# Patient Record
Sex: MALE | Race: WHITE | Employment: OTHER | URBAN - METROPOLITAN AREA
[De-identification: names, ages, dates, MRNs, and addresses within clinical notes are randomized per-mention and may not be internally consistent; named-entity substitution may affect disease eponyms.]

---

## 2018-08-31 RX ORDER — ASPIRIN 81 MG/1
1 TABLET ORAL DAILY
COMMUNITY

## 2018-08-31 RX ORDER — METOPROLOL TARTRATE 100 MG/1
100 TABLET ORAL 2 TIMES DAILY
COMMUNITY
Start: 2014-01-15

## 2018-08-31 RX ORDER — RANOLAZINE 500 MG/1
500 TABLET, EXTENDED RELEASE ORAL 2 TIMES DAILY
COMMUNITY
Start: 2014-01-15

## 2018-08-31 RX ORDER — NITROGLYCERIN 80 MG/1
1 PATCH TRANSDERMAL DAILY
COMMUNITY
Start: 2018-02-13

## 2018-08-31 RX ORDER — ATORVASTATIN CALCIUM 40 MG/1
40 TABLET, FILM COATED ORAL
COMMUNITY
Start: 2014-01-15

## 2018-08-31 RX ORDER — METFORMIN HYDROCHLORIDE 1000 MG/1
500 TABLET ORAL 2 TIMES DAILY
COMMUNITY
End: 2018-09-14

## 2018-08-31 NOTE — PERIOP NOTES
PAT - SURGICAL PRE-ADMISSION INSTRUCTIONS 
 
NAME:  Beverly Hoyos                                                          TODAY'S DATE:  8/31/2018 SURGERY DATE:  9/5/2018                                  SURGERY ARRIVAL TIME:   4722 1. Do NOT eat or drink anything, including candy or gum, after MIDNIGHT on 09/04/18 , unless you have specific instructions from your Surgeon or Anesthesia Provider to do so. 2. No smoking on the day of surgery. 3. No alcohol 24 hours prior to the day of surgery. 4. No recreational drugs for one week prior to the day of surgery. 5. Leave all valuables, including money/purse, at home. 6. Remove all jewelry, nail polish, makeup (including mascara); no lotions, powders, deodorant, or perfume/cologne/after shave. 7. Glasses/Contact lenses and Dentures may be worn to the hospital.  They will be removed prior to surgery. 8. Call your doctor if symptoms of a cold or illness develop within 24 ours prior to surgery. 9. AN ADULT MUST DRIVE YOU HOME AFTER OUTPATIENT SURGERY. 10. If you are having an OUTPATIENT procedure, please make arrangements for a responsible adult to be with you for 24 hours after your surgery. 11. If you are admitted to the hospital, you will be assigned to a bed after surgery is complete. Normally a family member will not be able to see you until you are in your assigned bed. 15. Family is encouraged to accompany you to the hospital.  We ask visitors in the treatment area to be limited to ONE person at a time to ensure patient privacy. EXCEPTIONS WILL BE MADE AS NEEDED. 15. Children under 12 are discouraged from entering the treatment area and need to be supervised by an adult when in the waiting room. Special Instructions: Take these medications the morning of surgery with a sip of water:  Ranexa , metoprolol, HOLD oral diabetic medication on the MORNING OF surgery. , HOLD metformin/glucophage dose starting the EVENING BEFORE the day of surgery., STOP anticoagulants AT LEAST 1 WEEK PRIOR to your surgery or, follow other MD instructions:  Aspirin and Xarelto last dose 8/31 Patient Prep: 
 
use CHG solution These surgical instructions were reviewed with Valery Ausitn during the PAT phone call. Directions: On the morning of surgery, please go to the [address] [place] [address]. Enter the building from the [facility] entrance, 1st floor (next to the Emergency Room entrance). Take the elevator to the 2nd floor. Sign in at the Registration Desk. If you have any questions and/or concerns, please do not hesitate to call: 
(Prior to the day of surgery)  [facility] unit:  [phone] (Day of surgery)  [facility] unit:  [phone]

## 2018-09-04 ENCOUNTER — ANESTHESIA EVENT (OUTPATIENT)
Dept: SURGERY | Age: 68
DRG: 460 | End: 2018-09-04
Payer: MEDICARE

## 2018-09-05 ENCOUNTER — HOSPITAL ENCOUNTER (INPATIENT)
Age: 68
LOS: 9 days | Discharge: NURSING, REHAB OR LONG-TERM CARE | DRG: 460 | End: 2018-09-14
Attending: ORTHOPAEDIC SURGERY | Admitting: ORTHOPAEDIC SURGERY
Payer: MEDICARE

## 2018-09-05 ENCOUNTER — APPOINTMENT (OUTPATIENT)
Dept: GENERAL RADIOLOGY | Age: 68
DRG: 460 | End: 2018-09-05
Attending: ORTHOPAEDIC SURGERY
Payer: MEDICARE

## 2018-09-05 ENCOUNTER — ANESTHESIA (OUTPATIENT)
Dept: SURGERY | Age: 68
DRG: 460 | End: 2018-09-05
Payer: MEDICARE

## 2018-09-05 DIAGNOSIS — M48.061 SPINAL STENOSIS OF LUMBAR REGION, UNSPECIFIED WHETHER NEUROGENIC CLAUDICATION PRESENT: Primary | ICD-10-CM

## 2018-09-05 LAB
ABO + RH BLD: NORMAL
BLOOD GROUP ANTIBODIES SERPL: NORMAL
GLUCOSE BLD STRIP.AUTO-MCNC: 167 MG/DL (ref 70–110)
GLUCOSE BLD STRIP.AUTO-MCNC: 180 MG/DL (ref 70–110)
GLUCOSE BLD STRIP.AUTO-MCNC: 180 MG/DL (ref 70–110)
SPECIMEN EXP DATE BLD: NORMAL

## 2018-09-05 PROCEDURE — 74011000250 HC RX REV CODE- 250: Performed by: ORTHOPAEDIC SURGERY

## 2018-09-05 PROCEDURE — 74011250636 HC RX REV CODE- 250/636

## 2018-09-05 PROCEDURE — 76010000134 HC OR TIME 3.5 TO 4 HR: Performed by: ORTHOPAEDIC SURGERY

## 2018-09-05 PROCEDURE — 77030031139 HC SUT VCRL2 J&J -A: Performed by: ORTHOPAEDIC SURGERY

## 2018-09-05 PROCEDURE — 65270000029 HC RM PRIVATE

## 2018-09-05 PROCEDURE — 97530 THERAPEUTIC ACTIVITIES: CPT

## 2018-09-05 PROCEDURE — 74011000272 HC RX REV CODE- 272: Performed by: ORTHOPAEDIC SURGERY

## 2018-09-05 PROCEDURE — 74011636637 HC RX REV CODE- 636/637: Performed by: NURSE ANESTHETIST, CERTIFIED REGISTERED

## 2018-09-05 PROCEDURE — 86900 BLOOD TYPING SEROLOGIC ABO: CPT | Performed by: NURSE ANESTHETIST, CERTIFIED REGISTERED

## 2018-09-05 PROCEDURE — 77030018836 HC SOL IRR NACL ICUM -A: Performed by: ORTHOPAEDIC SURGERY

## 2018-09-05 PROCEDURE — 0SG0071 FUSION OF LUMBAR VERTEBRAL JOINT WITH AUTOLOGOUS TISSUE SUBSTITUTE, POSTERIOR APPROACH, POSTERIOR COLUMN, OPEN APPROACH: ICD-10-PCS | Performed by: ORTHOPAEDIC SURGERY

## 2018-09-05 PROCEDURE — C1713 ANCHOR/SCREW BN/BN,TIS/BN: HCPCS | Performed by: ORTHOPAEDIC SURGERY

## 2018-09-05 PROCEDURE — 77030004391 HC BUR FLUT MEDT -C: Performed by: ORTHOPAEDIC SURGERY

## 2018-09-05 PROCEDURE — 77030020269 HC MISC IMPL: Performed by: ORTHOPAEDIC SURGERY

## 2018-09-05 PROCEDURE — 77030030107 HC BLD BN MILL DISP STRY -C: Performed by: ORTHOPAEDIC SURGERY

## 2018-09-05 PROCEDURE — 74011250637 HC RX REV CODE- 250/637: Performed by: INTERNAL MEDICINE

## 2018-09-05 PROCEDURE — 74011250636 HC RX REV CODE- 250/636: Performed by: NURSE ANESTHETIST, CERTIFIED REGISTERED

## 2018-09-05 PROCEDURE — C1762 CONN TISS, HUMAN(INC FASCIA): HCPCS | Performed by: ORTHOPAEDIC SURGERY

## 2018-09-05 PROCEDURE — 0ST20ZZ RESECTION OF LUMBAR VERTEBRAL DISC, OPEN APPROACH: ICD-10-PCS | Performed by: ORTHOPAEDIC SURGERY

## 2018-09-05 PROCEDURE — 74011250636 HC RX REV CODE- 250/636: Performed by: INTERNAL MEDICINE

## 2018-09-05 PROCEDURE — 36415 COLL VENOUS BLD VENIPUNCTURE: CPT | Performed by: NURSE ANESTHETIST, CERTIFIED REGISTERED

## 2018-09-05 PROCEDURE — 77030018723 HC ELCTRD BLD COVD -A: Performed by: ORTHOPAEDIC SURGERY

## 2018-09-05 PROCEDURE — 77030034850: Performed by: ORTHOPAEDIC SURGERY

## 2018-09-05 PROCEDURE — 77030011265 HC ELECTRD BLD HEX COVD -A: Performed by: ORTHOPAEDIC SURGERY

## 2018-09-05 PROCEDURE — 77030031359 HC BLD BN MILL DISP STRY -E: Performed by: ORTHOPAEDIC SURGERY

## 2018-09-05 PROCEDURE — 77010033678 HC OXYGEN DAILY

## 2018-09-05 PROCEDURE — 74011000250 HC RX REV CODE- 250

## 2018-09-05 PROCEDURE — 01NB0ZZ RELEASE LUMBAR NERVE, OPEN APPROACH: ICD-10-PCS | Performed by: ORTHOPAEDIC SURGERY

## 2018-09-05 PROCEDURE — 74011250637 HC RX REV CODE- 250/637: Performed by: NURSE ANESTHETIST, CERTIFIED REGISTERED

## 2018-09-05 PROCEDURE — 76210000016 HC OR PH I REC 1 TO 1.5 HR: Performed by: ORTHOPAEDIC SURGERY

## 2018-09-05 PROCEDURE — 77030019908 HC STETH ESOPH SIMS -A: Performed by: ANESTHESIOLOGY

## 2018-09-05 PROCEDURE — 82962 GLUCOSE BLOOD TEST: CPT

## 2018-09-05 PROCEDURE — 77030018673: Performed by: ORTHOPAEDIC SURGERY

## 2018-09-05 PROCEDURE — 77030013079 HC BLNKT BAIR HGGR 3M -A: Performed by: ANESTHESIOLOGY

## 2018-09-05 PROCEDURE — 97116 GAIT TRAINING THERAPY: CPT

## 2018-09-05 PROCEDURE — 74011250637 HC RX REV CODE- 250/637: Performed by: ORTHOPAEDIC SURGERY

## 2018-09-05 PROCEDURE — 97162 PT EVAL MOD COMPLEX 30 MIN: CPT

## 2018-09-05 PROCEDURE — 77030003028 HC SUT VCRL J&J -A: Performed by: ORTHOPAEDIC SURGERY

## 2018-09-05 PROCEDURE — 77030026918 HC ADMN ST IV BLD BD -A: Performed by: ANESTHESIOLOGY

## 2018-09-05 PROCEDURE — 77030008477 HC STYL SATN SLP COVD -A: Performed by: ANESTHESIOLOGY

## 2018-09-05 PROCEDURE — 74011636637 HC RX REV CODE- 636/637: Performed by: ORTHOPAEDIC SURGERY

## 2018-09-05 PROCEDURE — 74011250636 HC RX REV CODE- 250/636: Performed by: ORTHOPAEDIC SURGERY

## 2018-09-05 PROCEDURE — 77030021352 HC CBL LD SYS DISP COVD -B

## 2018-09-05 PROCEDURE — 77030032490 HC SLV COMPR SCD KNE COVD -B: Performed by: ORTHOPAEDIC SURGERY

## 2018-09-05 PROCEDURE — 76060000038 HC ANESTHESIA 3.5 TO 4 HR: Performed by: ORTHOPAEDIC SURGERY

## 2018-09-05 PROCEDURE — 72100 X-RAY EXAM L-S SPINE 2/3 VWS: CPT

## 2018-09-05 PROCEDURE — 77030008683 HC TU ET CUF COVD -A: Performed by: ANESTHESIOLOGY

## 2018-09-05 DEVICE — SCREW POLYAX ASMBLY 6.5MMX45MM: Type: IMPLANTABLE DEVICE | Site: SPINE LUMBAR | Status: FUNCTIONAL

## 2018-09-05 DEVICE — SCREW SPNL L50MM OD65MM POLYAX ASSEMB CANAVERAL: Type: IMPLANTABLE DEVICE | Site: SPINE LUMBAR | Status: FUNCTIONAL

## 2018-09-05 RX ORDER — METOPROLOL TARTRATE 50 MG/1
100 TABLET ORAL 2 TIMES DAILY
Status: DISCONTINUED | OUTPATIENT
Start: 2018-09-05 | End: 2018-09-05

## 2018-09-05 RX ORDER — MORPHINE SULFATE 4 MG/ML
INJECTION INTRAVENOUS
Status: DISPENSED
Start: 2018-09-05 | End: 2018-09-06

## 2018-09-05 RX ORDER — ASPIRIN 81 MG/1
81 TABLET ORAL DAILY
Status: DISCONTINUED | OUTPATIENT
Start: 2018-09-06 | End: 2018-09-14 | Stop reason: HOSPADM

## 2018-09-05 RX ORDER — SODIUM CHLORIDE 0.9 % (FLUSH) 0.9 %
5-10 SYRINGE (ML) INJECTION AS NEEDED
Status: DISCONTINUED | OUTPATIENT
Start: 2018-09-05 | End: 2018-09-14 | Stop reason: HOSPADM

## 2018-09-05 RX ORDER — ONDANSETRON 2 MG/ML
4 INJECTION INTRAMUSCULAR; INTRAVENOUS ONCE
Status: DISCONTINUED | OUTPATIENT
Start: 2018-09-05 | End: 2018-09-05 | Stop reason: HOSPADM

## 2018-09-05 RX ORDER — INSULIN LISPRO 100 [IU]/ML
INJECTION, SOLUTION INTRAVENOUS; SUBCUTANEOUS ONCE
Status: COMPLETED | OUTPATIENT
Start: 2018-09-05 | End: 2018-09-05

## 2018-09-05 RX ORDER — HYDROMORPHONE HYDROCHLORIDE 1 MG/ML
INJECTION, SOLUTION INTRAMUSCULAR; INTRAVENOUS; SUBCUTANEOUS AS NEEDED
Status: DISCONTINUED | OUTPATIENT
Start: 2018-09-05 | End: 2018-09-05 | Stop reason: HOSPADM

## 2018-09-05 RX ORDER — RANOLAZINE 500 MG/1
500 TABLET, EXTENDED RELEASE ORAL 2 TIMES DAILY
Status: DISCONTINUED | OUTPATIENT
Start: 2018-09-05 | End: 2018-09-14 | Stop reason: HOSPADM

## 2018-09-05 RX ORDER — LABETALOL HCL 20 MG/4 ML
SYRINGE (ML) INTRAVENOUS AS NEEDED
Status: DISCONTINUED | OUTPATIENT
Start: 2018-09-05 | End: 2018-09-05 | Stop reason: HOSPADM

## 2018-09-05 RX ORDER — INSULIN LISPRO 100 [IU]/ML
INJECTION, SOLUTION INTRAVENOUS; SUBCUTANEOUS
Status: DISCONTINUED | OUTPATIENT
Start: 2018-09-05 | End: 2018-09-07

## 2018-09-05 RX ORDER — SODIUM CHLORIDE, SODIUM LACTATE, POTASSIUM CHLORIDE, CALCIUM CHLORIDE 600; 310; 30; 20 MG/100ML; MG/100ML; MG/100ML; MG/100ML
25 INJECTION, SOLUTION INTRAVENOUS CONTINUOUS
Status: DISPENSED | OUTPATIENT
Start: 2018-09-05 | End: 2018-09-06

## 2018-09-05 RX ORDER — FENTANYL CITRATE 50 UG/ML
INJECTION, SOLUTION INTRAMUSCULAR; INTRAVENOUS AS NEEDED
Status: DISCONTINUED | OUTPATIENT
Start: 2018-09-05 | End: 2018-09-05 | Stop reason: HOSPADM

## 2018-09-05 RX ORDER — GLYCOPYRROLATE 0.2 MG/ML
INJECTION INTRAMUSCULAR; INTRAVENOUS AS NEEDED
Status: DISCONTINUED | OUTPATIENT
Start: 2018-09-05 | End: 2018-09-05 | Stop reason: HOSPADM

## 2018-09-05 RX ORDER — VECURONIUM BROMIDE FOR INJECTION 1 MG/ML
INJECTION, POWDER, LYOPHILIZED, FOR SOLUTION INTRAVENOUS AS NEEDED
Status: DISCONTINUED | OUTPATIENT
Start: 2018-09-05 | End: 2018-09-05 | Stop reason: HOSPADM

## 2018-09-05 RX ORDER — OXYCODONE AND ACETAMINOPHEN 10; 325 MG/1; MG/1
1-2 TABLET ORAL
Status: DISCONTINUED | OUTPATIENT
Start: 2018-09-05 | End: 2018-09-06

## 2018-09-05 RX ORDER — LIDOCAINE HYDROCHLORIDE 10 MG/ML
0.1 INJECTION, SOLUTION EPIDURAL; INFILTRATION; INTRACAUDAL; PERINEURAL AS NEEDED
Status: DISCONTINUED | OUTPATIENT
Start: 2018-09-05 | End: 2018-09-14 | Stop reason: HOSPADM

## 2018-09-05 RX ORDER — CEFAZOLIN SODIUM 2 G/50ML
2 SOLUTION INTRAVENOUS
Status: COMPLETED | OUTPATIENT
Start: 2018-09-05 | End: 2018-09-05

## 2018-09-05 RX ORDER — ACETAMINOPHEN 325 MG/1
650 TABLET ORAL
Status: DISCONTINUED | OUTPATIENT
Start: 2018-09-05 | End: 2018-09-14 | Stop reason: HOSPADM

## 2018-09-05 RX ORDER — ONDANSETRON 2 MG/ML
INJECTION INTRAMUSCULAR; INTRAVENOUS AS NEEDED
Status: DISCONTINUED | OUTPATIENT
Start: 2018-09-05 | End: 2018-09-05 | Stop reason: HOSPADM

## 2018-09-05 RX ORDER — METOPROLOL SUCCINATE 50 MG/1
50 TABLET, EXTENDED RELEASE ORAL 2 TIMES DAILY
Status: DISCONTINUED | OUTPATIENT
Start: 2018-09-05 | End: 2018-09-06

## 2018-09-05 RX ORDER — DOCUSATE SODIUM 100 MG/1
100 CAPSULE, LIQUID FILLED ORAL DAILY
Status: DISCONTINUED | OUTPATIENT
Start: 2018-09-06 | End: 2018-09-07

## 2018-09-05 RX ORDER — DIAZEPAM 5 MG/1
5 TABLET ORAL
Status: DISCONTINUED | OUTPATIENT
Start: 2018-09-05 | End: 2018-09-14

## 2018-09-05 RX ORDER — LIDOCAINE HYDROCHLORIDE 20 MG/ML
INJECTION, SOLUTION EPIDURAL; INFILTRATION; INTRACAUDAL; PERINEURAL AS NEEDED
Status: DISCONTINUED | OUTPATIENT
Start: 2018-09-05 | End: 2018-09-05 | Stop reason: HOSPADM

## 2018-09-05 RX ORDER — MORPHINE SULFATE 2 MG/ML
2 INJECTION, SOLUTION INTRAMUSCULAR; INTRAVENOUS
Status: DISCONTINUED | OUTPATIENT
Start: 2018-09-05 | End: 2018-09-05 | Stop reason: HOSPADM

## 2018-09-05 RX ORDER — SODIUM CHLORIDE, SODIUM LACTATE, POTASSIUM CHLORIDE, CALCIUM CHLORIDE 600; 310; 30; 20 MG/100ML; MG/100ML; MG/100ML; MG/100ML
50 INJECTION, SOLUTION INTRAVENOUS CONTINUOUS
Status: DISCONTINUED | OUTPATIENT
Start: 2018-09-05 | End: 2018-09-05 | Stop reason: HOSPADM

## 2018-09-05 RX ORDER — DEXAMETHASONE SODIUM PHOSPHATE 4 MG/ML
INJECTION, SOLUTION INTRA-ARTICULAR; INTRALESIONAL; INTRAMUSCULAR; INTRAVENOUS; SOFT TISSUE AS NEEDED
Status: DISCONTINUED | OUTPATIENT
Start: 2018-09-05 | End: 2018-09-05 | Stop reason: HOSPADM

## 2018-09-05 RX ORDER — FAMOTIDINE 20 MG/1
20 TABLET, FILM COATED ORAL ONCE
Status: COMPLETED | OUTPATIENT
Start: 2018-09-05 | End: 2018-09-05

## 2018-09-05 RX ORDER — SODIUM CHLORIDE 0.9 % (FLUSH) 0.9 %
5-10 SYRINGE (ML) INJECTION EVERY 8 HOURS
Status: DISCONTINUED | OUTPATIENT
Start: 2018-09-05 | End: 2018-09-14 | Stop reason: HOSPADM

## 2018-09-05 RX ORDER — SUCCINYLCHOLINE CHLORIDE 20 MG/ML
INJECTION INTRAMUSCULAR; INTRAVENOUS AS NEEDED
Status: DISCONTINUED | OUTPATIENT
Start: 2018-09-05 | End: 2018-09-05 | Stop reason: HOSPADM

## 2018-09-05 RX ORDER — ADHESIVE BANDAGE
30 BANDAGE TOPICAL DAILY PRN
Status: DISCONTINUED | OUTPATIENT
Start: 2018-09-05 | End: 2018-09-14 | Stop reason: HOSPADM

## 2018-09-05 RX ORDER — VANCOMYCIN HYDROCHLORIDE 1 G/20ML
INJECTION, POWDER, LYOPHILIZED, FOR SOLUTION INTRAVENOUS AS NEEDED
Status: DISCONTINUED | OUTPATIENT
Start: 2018-09-05 | End: 2018-09-05 | Stop reason: HOSPADM

## 2018-09-05 RX ORDER — FENTANYL CITRATE 50 UG/ML
50 INJECTION, SOLUTION INTRAMUSCULAR; INTRAVENOUS AS NEEDED
Status: DISCONTINUED | OUTPATIENT
Start: 2018-09-05 | End: 2018-09-05 | Stop reason: HOSPADM

## 2018-09-05 RX ORDER — CLINDAMYCIN PHOSPHATE 600 MG/50ML
600 INJECTION INTRAVENOUS EVERY 8 HOURS
Status: COMPLETED | OUTPATIENT
Start: 2018-09-05 | End: 2018-09-06

## 2018-09-05 RX ORDER — SODIUM CHLORIDE 9 MG/ML
INJECTION, SOLUTION INTRAVENOUS
Status: DISCONTINUED | OUTPATIENT
Start: 2018-09-05 | End: 2018-09-05 | Stop reason: HOSPADM

## 2018-09-05 RX ORDER — SODIUM CHLORIDE AND POTASSIUM CHLORIDE .9; .15 G/100ML; G/100ML
SOLUTION INTRAVENOUS CONTINUOUS
Status: DISCONTINUED | OUTPATIENT
Start: 2018-09-05 | End: 2018-09-07

## 2018-09-05 RX ORDER — MORPHINE SULFATE 2 MG/ML
5 INJECTION, SOLUTION INTRAMUSCULAR; INTRAVENOUS
Status: DISCONTINUED | OUTPATIENT
Start: 2018-09-05 | End: 2018-09-05

## 2018-09-05 RX ORDER — PROPOFOL 10 MG/ML
INJECTION, EMULSION INTRAVENOUS AS NEEDED
Status: DISCONTINUED | OUTPATIENT
Start: 2018-09-05 | End: 2018-09-05 | Stop reason: HOSPADM

## 2018-09-05 RX ORDER — NEOSTIGMINE METHYLSULFATE 5 MG/5 ML
SYRINGE (ML) INTRAVENOUS AS NEEDED
Status: DISCONTINUED | OUTPATIENT
Start: 2018-09-05 | End: 2018-09-05 | Stop reason: HOSPADM

## 2018-09-05 RX ORDER — ONDANSETRON 2 MG/ML
4 INJECTION INTRAMUSCULAR; INTRAVENOUS
Status: DISCONTINUED | OUTPATIENT
Start: 2018-09-05 | End: 2018-09-14 | Stop reason: HOSPADM

## 2018-09-05 RX ORDER — DIAZEPAM 5 MG/1
10 TABLET ORAL
Status: DISCONTINUED | OUTPATIENT
Start: 2018-09-05 | End: 2018-09-05

## 2018-09-05 RX ORDER — MAGNESIUM SULFATE 100 %
4 CRYSTALS MISCELLANEOUS AS NEEDED
Status: DISCONTINUED | OUTPATIENT
Start: 2018-09-05 | End: 2018-09-05 | Stop reason: HOSPADM

## 2018-09-05 RX ORDER — OXYCODONE AND ACETAMINOPHEN 10; 325 MG/1; MG/1
2 TABLET ORAL
Status: DISCONTINUED | OUTPATIENT
Start: 2018-09-05 | End: 2018-09-05

## 2018-09-05 RX ORDER — MIDAZOLAM HYDROCHLORIDE 1 MG/ML
INJECTION, SOLUTION INTRAMUSCULAR; INTRAVENOUS AS NEEDED
Status: DISCONTINUED | OUTPATIENT
Start: 2018-09-05 | End: 2018-09-05 | Stop reason: HOSPADM

## 2018-09-05 RX ORDER — MORPHINE SULFATE 2 MG/ML
4 INJECTION, SOLUTION INTRAMUSCULAR; INTRAVENOUS
Status: DISCONTINUED | OUTPATIENT
Start: 2018-09-05 | End: 2018-09-06

## 2018-09-05 RX ORDER — ATORVASTATIN CALCIUM 40 MG/1
40 TABLET, FILM COATED ORAL
Status: DISCONTINUED | OUTPATIENT
Start: 2018-09-05 | End: 2018-09-07

## 2018-09-05 RX ORDER — NITROGLYCERIN 80 MG/1
1 PATCH TRANSDERMAL DAILY
Status: DISCONTINUED | OUTPATIENT
Start: 2018-09-06 | End: 2018-09-11

## 2018-09-05 RX ORDER — DEXTROSE MONOHYDRATE 25 G/50ML
25-50 INJECTION, SOLUTION INTRAVENOUS AS NEEDED
Status: DISCONTINUED | OUTPATIENT
Start: 2018-09-05 | End: 2018-09-05 | Stop reason: HOSPADM

## 2018-09-05 RX ORDER — MORPHINE SULFATE 10 MG/ML
10 INJECTION, SOLUTION INTRAMUSCULAR; INTRAVENOUS
Status: DISCONTINUED | OUTPATIENT
Start: 2018-09-05 | End: 2018-09-05

## 2018-09-05 RX ADMIN — FENTANYL CITRATE 50 MCG: 50 INJECTION, SOLUTION INTRAMUSCULAR; INTRAVENOUS at 11:47

## 2018-09-05 RX ADMIN — PROPOFOL 150 MG: 10 INJECTION, EMULSION INTRAVENOUS at 07:36

## 2018-09-05 RX ADMIN — CEFAZOLIN SODIUM 2 G: 2 SOLUTION INTRAVENOUS at 07:55

## 2018-09-05 RX ADMIN — ONDANSETRON 4 MG: 2 INJECTION, SOLUTION INTRAMUSCULAR; INTRAVENOUS at 12:37

## 2018-09-05 RX ADMIN — INSULIN LISPRO 2 UNITS: 100 INJECTION, SOLUTION INTRAVENOUS; SUBCUTANEOUS at 23:26

## 2018-09-05 RX ADMIN — VECURONIUM BROMIDE FOR INJECTION 1 MG: 1 INJECTION, POWDER, LYOPHILIZED, FOR SOLUTION INTRAVENOUS at 10:22

## 2018-09-05 RX ADMIN — CLINDAMYCIN PHOSPHATE 600 MG: 600 INJECTION, SOLUTION INTRAVENOUS at 23:28

## 2018-09-05 RX ADMIN — Medication 4 MG: at 10:57

## 2018-09-05 RX ADMIN — INSULIN LISPRO 3 UNITS: 100 INJECTION, SOLUTION INTRAVENOUS; SUBCUTANEOUS at 11:30

## 2018-09-05 RX ADMIN — Medication 10 MG: at 11:08

## 2018-09-05 RX ADMIN — VECURONIUM BROMIDE FOR INJECTION 1 MG: 1 INJECTION, POWDER, LYOPHILIZED, FOR SOLUTION INTRAVENOUS at 08:39

## 2018-09-05 RX ADMIN — METOPROLOL SUCCINATE 50 MG: 50 TABLET, EXTENDED RELEASE ORAL at 21:09

## 2018-09-05 RX ADMIN — ONDANSETRON 4 MG: 2 INJECTION INTRAMUSCULAR; INTRAVENOUS at 10:32

## 2018-09-05 RX ADMIN — DEXAMETHASONE SODIUM PHOSPHATE 4 MG: 4 INJECTION, SOLUTION INTRA-ARTICULAR; INTRALESIONAL; INTRAMUSCULAR; INTRAVENOUS; SOFT TISSUE at 08:02

## 2018-09-05 RX ADMIN — SODIUM CHLORIDE, SODIUM LACTATE, POTASSIUM CHLORIDE, AND CALCIUM CHLORIDE 25 ML/HR: 600; 310; 30; 20 INJECTION, SOLUTION INTRAVENOUS at 07:01

## 2018-09-05 RX ADMIN — OXYCODONE HYDROCHLORIDE AND ACETAMINOPHEN 2 TABLET: 10; 325 TABLET ORAL at 21:08

## 2018-09-05 RX ADMIN — LIDOCAINE HYDROCHLORIDE 80 MG: 20 INJECTION, SOLUTION EPIDURAL; INFILTRATION; INTRACAUDAL; PERINEURAL at 07:36

## 2018-09-05 RX ADMIN — VECURONIUM BROMIDE FOR INJECTION 1 MG: 1 INJECTION, POWDER, LYOPHILIZED, FOR SOLUTION INTRAVENOUS at 08:14

## 2018-09-05 RX ADMIN — VECURONIUM BROMIDE FOR INJECTION 4 MG: 1 INJECTION, POWDER, LYOPHILIZED, FOR SOLUTION INTRAVENOUS at 07:55

## 2018-09-05 RX ADMIN — OXYCODONE HYDROCHLORIDE AND ACETAMINOPHEN 2 TABLET: 10; 325 TABLET ORAL at 14:32

## 2018-09-05 RX ADMIN — ATORVASTATIN CALCIUM 40 MG: 40 TABLET, FILM COATED ORAL at 21:09

## 2018-09-05 RX ADMIN — VECURONIUM BROMIDE FOR INJECTION 1 MG: 1 INJECTION, POWDER, LYOPHILIZED, FOR SOLUTION INTRAVENOUS at 07:36

## 2018-09-05 RX ADMIN — CLINDAMYCIN PHOSPHATE 600 MG: 600 INJECTION, SOLUTION INTRAVENOUS at 15:21

## 2018-09-05 RX ADMIN — VECURONIUM BROMIDE FOR INJECTION 1 MG: 1 INJECTION, POWDER, LYOPHILIZED, FOR SOLUTION INTRAVENOUS at 09:30

## 2018-09-05 RX ADMIN — Medication: at 23:26

## 2018-09-05 RX ADMIN — VECURONIUM BROMIDE FOR INJECTION 1 MG: 1 INJECTION, POWDER, LYOPHILIZED, FOR SOLUTION INTRAVENOUS at 09:03

## 2018-09-05 RX ADMIN — MIDAZOLAM HYDROCHLORIDE 2 MG: 1 INJECTION, SOLUTION INTRAMUSCULAR; INTRAVENOUS at 07:30

## 2018-09-05 RX ADMIN — MORPHINE SULFATE 5 MG: 2 INJECTION, SOLUTION INTRAMUSCULAR; INTRAVENOUS at 17:49

## 2018-09-05 RX ADMIN — FENTANYL CITRATE 50 MCG: 50 INJECTION, SOLUTION INTRAMUSCULAR; INTRAVENOUS at 11:25

## 2018-09-05 RX ADMIN — GLYCOPYRROLATE 0.5 MG: 0.2 INJECTION INTRAMUSCULAR; INTRAVENOUS at 10:57

## 2018-09-05 RX ADMIN — SODIUM CHLORIDE: 9 INJECTION, SOLUTION INTRAVENOUS at 07:57

## 2018-09-05 RX ADMIN — DIAZEPAM 10 MG: 5 TABLET ORAL at 12:14

## 2018-09-05 RX ADMIN — SODIUM CHLORIDE, SODIUM LACTATE, POTASSIUM CHLORIDE, AND CALCIUM CHLORIDE: 600; 310; 30; 20 INJECTION, SOLUTION INTRAVENOUS at 09:45

## 2018-09-05 RX ADMIN — Medication 10 ML: at 23:27

## 2018-09-05 RX ADMIN — SUCCINYLCHOLINE CHLORIDE 100 MG: 20 INJECTION INTRAMUSCULAR; INTRAVENOUS at 07:36

## 2018-09-05 RX ADMIN — RANOLAZINE 500 MG: 500 TABLET, FILM COATED, EXTENDED RELEASE ORAL at 21:09

## 2018-09-05 RX ADMIN — MORPHINE SULFATE 5 MG: 2 INJECTION, SOLUTION INTRAMUSCULAR; INTRAVENOUS at 13:11

## 2018-09-05 RX ADMIN — HYDROMORPHONE HYDROCHLORIDE 0.5 MG: 1 INJECTION, SOLUTION INTRAMUSCULAR; INTRAVENOUS; SUBCUTANEOUS at 08:04

## 2018-09-05 RX ADMIN — FAMOTIDINE 20 MG: 20 TABLET ORAL at 06:44

## 2018-09-05 RX ADMIN — FENTANYL CITRATE 100 MCG: 50 INJECTION, SOLUTION INTRAMUSCULAR; INTRAVENOUS at 07:36

## 2018-09-05 RX ADMIN — INSULIN LISPRO 2 UNITS: 100 INJECTION, SOLUTION INTRAVENOUS; SUBCUTANEOUS at 07:21

## 2018-09-05 RX ADMIN — VECURONIUM BROMIDE FOR INJECTION 1 MG: 1 INJECTION, POWDER, LYOPHILIZED, FOR SOLUTION INTRAVENOUS at 09:55

## 2018-09-05 RX ADMIN — HYDROMORPHONE HYDROCHLORIDE 0.5 MG: 1 INJECTION, SOLUTION INTRAMUSCULAR; INTRAVENOUS; SUBCUTANEOUS at 08:21

## 2018-09-05 RX ADMIN — Medication: at 12:38

## 2018-09-05 RX ADMIN — Medication 10 ML: at 13:16

## 2018-09-05 NOTE — ANESTHESIA PREPROCEDURE EVALUATION
Anesthetic History Review of Systems / Medical History Patient summary reviewed, nursing notes reviewed and pertinent labs reviewed Pulmonary Within defined limits Neuro/Psych Within defined limits Cardiovascular Hypertension: well controlled Dysrhythmias : atrial fibrillation CAD and cardiac stents Exercise tolerance: >4 METS 
  
GI/Hepatic/Renal 
Within defined limits Endo/Other Diabetes: type 2 Other Findings Comments: menieres disease-tinnitis Physical Exam 
 
Airway Mallampati: II 
TM Distance: > 6 cm Neck ROM: normal range of motion Mouth opening: Normal 
 
 Cardiovascular Rhythm: irregular Dental 
 
Dentition: Full upper dentures and Lower dentition intact Pulmonary Breath sounds clear to auscultation Abdominal 
 
 
 
Comments: obese Other Findings Anesthetic Plan ASA: 3 Anesthesia type: general 
 
 
 
 
Induction: Intravenous Anesthetic plan and risks discussed with: Patient

## 2018-09-05 NOTE — ROUTINE PROCESS
Patient arrives by bed from pacu. Alert and VSS. Ice pack applied to lap sites.  at beside. Primary nurse updated.

## 2018-09-05 NOTE — CONSULTS
Reason for consultation:    Monitoring and management of  acute and chronic medical problems in the post-operative    Diagnosis: L/S STENOSIS SPONDYL M48.061 M43.16       Procedure:  DECOMPRESSION L1-2 2-5, POSTEROLATERAL SPINE FUSION FLOSPINE L4-5    HPI: Pleasant gentleman post uneventful L/S surgery. Acceptable post op pain. No leg numbness  No CP SOB or palpitation. Extensive CAD with preserved LVEF as outline above    ACTIVE MEDICAL PROBLEMS/PMH/PSH  3-vessel coronary artery disease with chronically occluded right coronary artery. He had previous stent to the proximal circumflex artery with significant disease distal left circumflex and distal LAD. Paroxysmal atrial fibrillation    Chronic OAC with xarelto    HTN    T2DM    Patient Active Problem List   Diagnosis Code    Lumbar stenosis M48.061     LEXISCAN NUCLEAR STRESS TEST 8/21/2018   Findings:  the stress myocardial perfusion study shows mild decreased perfusion inferior wall and inferolateral wall, the resting study shows normal myocardial perfusion. The EKG gated study shows normal wall motion normal contractility the calculated ejection fraction is 65%    Impressions:  1 abnormal myocardial perfusion with inferolateral ischemia  2 normal left ventricular systolic function    Paroxysmal atrial fibrillation        PMH:  Past Medical History:   Diagnosis Date    Atrial fibrillation ([address]     CAD (coronary artery disease)     [facility]     Hypertension     Meniere's disease        PSH:  Past Surgical History:   Procedure Laterality Date    HX HEART CATHETERIZATION      Stent    HX ORTHOPAEDIC      cervical sx       MEDICATIONS    Prescriptions Prior to Admission   Medication Sig    rivaroxaban (XARELTO) 20 mg tab tablet Take 20 mg by mouth daily (with breakfast).  ranolazine ER (RANEXA) 500 mg SR tablet Take 500 mg by mouth two (2) times a day.  atorvastatin (LIPITOR) 40 mg tablet Take 40 mg by mouth nightly.     metoprolol tartrate (LOPRESSOR) 100 mg IR tablet Take 100 mg by mouth two (2) times a day.  nitroglycerin (NITRODUR) 0.4 mg/hr 1 Patch by TransDERmal route daily.  metFORMIN (GLUCOPHAGE) 1,000 mg tablet Take 500 mg by mouth two (2) times a day.  aspirin delayed-release 81 mg tablet Take 1 Tab by mouth daily. ALLERGIES: NKA      Social History     Social History    Marital status:      Spouse name: N/A    Number of children: N/A    Years of education: N/A     Occupational History    Not on file. Social History Main Topics    Smoking status: Never Smoker    Smokeless tobacco: Never Used    Alcohol use No    Drug use: No    Sexual activity: Not on file     Other Topics Concern    Not on file     Social History Narrative        ROS:  Constitutional:  No headache. Eyes:  No visual changes  Ears: No hearing loss. Nose:  No bleed  Neck: No pain. Cardiac: No CP. DEL REAL. No orthopnea. No PND. No leg edema . No palpitation  Respiratory: No cough . No SOB. No wheezing . No hemoptysis  GI: No nausea . No vomiting. No reflux. No abdominal pain. Normal BM . No black stool No blood in the stool  : No dysuria. Occ sense of incomplete emtying; Nocturia 2/night. No hematuria  M/S: +back pain  Neuro: No LE numbness . No weakness  Skin: No itching  Endocrine: no polydipsia. No change in tone of voice. Psych: No anxiety . No depression. + Loud snorning       Physical Exam:      Visit Vitals    /62    Pulse 70    Temp 98.6 °F (37 °C)    Resp 13    Ht 5' 6\" (1.676 m)    Wt 85.1 kg (187 lb 9 oz)    SpO2 100%    BMI 30.27 kg/m2       GENERAL: WN NAD bit drowsy  HEENT:    Face:Symmetrical  CONJUNCTIVA: Pink. No swelling.  No discharge    SCLERA: Anicteric    LENSES : opacified  TEETH: Edentulous Upper    NECK: No JVD. No masses. No enlarged-tender lymph nodes.  Trachea in mid line.    THYROID: Size normal. No thyroid nodules    CAROTID ARTERIES: Pulsation 2+ bilaterally. No bruits  LUNGS: CTA.  BS Normal Bilaterally  HEART: RRR   Normal S1 S2. 6-3/7  Aortic systolic murmur No S3 S4  ABDOMEN: Soft. Normal BS. No tenderness. No palpable enlarged aorta. No bruits. LE: No edema; SCD in place  PULSES: Radial 2+; Femoral 2+; Posterior Tibialis 2+    NEUROLOGIC:     Muscle Strength, Bulk & Tone  RUE: strength, bulk & tone: WNL  LUE: strength, bulk & tone: WNL  RLE: strength, bulk & tone: WNL  LLE: strength, bulk & tone:  WNL     Reflexes: not tested    PSYCHIATRIC    Mood: normal  Affect: appropriate                 Oriented to Person   Place   Time                     Labs Reviewed:    Recent Results (from the past 24 hour(s))   TYPE & SCREEN    Collection Time: 09/05/18  6:50 AM   Result Value Ref Range    Crossmatch Expiration 09/08/2018     ABO/Rh(D) B POSITIVE     Antibody screen NEG    GLUCOSE, POC    Collection Time: 09/05/18  7:17 AM   Result Value Ref Range    Glucose (POC) 180 (H) 70 - 110 mg/dL   GLUCOSE, POC    Collection Time: 09/05/18 11:24 AM   Result Value Ref Range    Glucose (POC) 180 (H) 70 - 110 mg/dL     ASSESSMENT  Stable post op   Post op pain controlled  3 vessels CAD Preserved LVEF  Abnormal myocardial perfusion with inferolateral ischemia  P-Afib OAC on hold  T2DM  HTN    PLAN  No Xarelto x 5 days  Restart Other PTA meds  BROOKS peterson in MD Sae  9/5/2018, 5:48 PM

## 2018-09-05 NOTE — PROGRESS NOTES
Problem: Mobility Impaired (Adult and Pediatric) Goal: *Acute Goals and Plan of Care (Insert Text) Physical Therapy Goals Initiated 9/5/2018 and to be accomplished within 7 days. 1.  Patient will complete all bed mobility with minimal assistance/contact guard assist in order to prepare for EOB/OOB activity. 2.  Patient will perform sit <> stand with supervision/set-up in order to prepare for OOB/gait activity. 3.  Patient will perform bed to chair transfers with minimal assistance/contact guard assist in order to promote mobility and encourage seated activity to progress towards their prior level of function. 4.  Patient will ambulate 50 feet with supervision/set-up using LRAD in order to prepare for safe negotiation of their environment. Outcome: Progressing Towards Goal 
PHYSICAL THERAPY: Initial Assessment INPATIENT: Medicare: Hospital Day: 1 Patient: Maria E Hopkins (20 y.o. male)    Date: 9/5/2018 Primary Diagnosis: STENOSIS SPONDYL M48.061 M43.16 Lumbar stenosis Procedure(s) (LRB): 
DECOMPRESSION L1-2 2-5, POSTEROLATERAL SPINE FUSION FLOSPINE L4-5 (N/A), Day of Surgery Precautions: Spinal 
 
PLOF: Independent ASSESSMENT : 
Patient supine in bed, agreeable to participation with PT. Family present. Patient reports that he lives in a 2 story home with his wife. URIEL Mahmood present for evaluation. Limited activity as TLSO not present in room. Family reports that they do not have the TLSO and thought it would be provided at the hospital. Will need TLSO for gait and OOB to chair, URIEL Mahmood aware. MAX x 1 for rolling to side in bed. MAX for supine <> sit. MAX A for sit <> stand. Patient unable to clear LEs in stance to attempt taking a step forward. Patient returned to bed and left sidelying and he verbalizes comfort in this position. Provided handout and education regarding strategy for bed mobility and spinal precautions.  All needs left within reach. Patient verbalizes cervical and lumbar pain to URIEL Burnett which he rates 10/10. Recommend d/c to rehab facility to maximize safety with mobility. Verbalizes that he does not want to d/c home as his wife is still recovering from a procedure 3 months ago, seems agreeable to rehab facility for d/c Patient presents with deficits in: 
Bed Mobility, Transfers, Gait and Strength Patient will benefit from skilled intervention to address the above impairments. Patients rehabilitation potential is considered to be Good Factors which may influence rehabilitation potential include:  
[]         None noted 
[]         Mental ability/status [x]         Medical condition 
[]         Home/family situation and support systems 
[]         Safety awareness 
[]         Pain tolerance/management 
[]         Other: PLAN : 
Recommendations and Planned Interventions: 
[x]           Bed Mobility Training             [x]    Neuromuscular Re-Education 
[x]           Transfer Training                   []    Orthotic/Prosthetic Training 
[x]           Gait Training                          []    Modalities [x]           Therapeutic Exercises          []    Edema Management/Control 
[x]           Therapeutic Activities            [x]    Patient and Family Training/Education 
[]           Other (comment): EDUCATION:  
Education:  Patient was educated on the following topics: purpose of PT, PT POC, spinal precaution, strategy for bed mobility and transfers. Verbalizes understanding. Barriers to Learning/Limitations: None Compensate with: visual, verbal, tactile, kinesthetic cues/model Recommendations for the next treatment session: Gait if TLSO present Frequency/Duration: Patient will be followed by physical therapy 1-2 times per day/4-7 days per week to address goals. Discharge Recommendations: Rehab Further Equipment Recommendations for Discharge: rolling walker Factors which may impact discharge planning: N/A  
 
SUBJECTIVE:  
Patient stated I don't want to go home please do something about that.  OBJECTIVE DATA SUMMARY:  
 
Past Medical History:  
Diagnosis Date  [facility]rial [facility] [address]  CAD (coronary artery disease)  Diabetes ([facility]  Hypertension  Meniere's disease Past Surgical History:  
Procedure Laterality Date  HX HEART CATHETERIZATION Stent  HX ORTHOPAEDIC    
 cervical sx Eval Complexity: History: MEDIUM  Complexity : 1-2 comorbidities / personal factors will impact the outcome/ POC Exam:MEDIUM Complexity : 3 Standardized tests and measures addressing body structure, function, activity limitation and / or participation in recreation  Presentation: MEDIUM Complexity : Evolving with changing characteristics  Clinical Decision Making:Medium Complexity [facility] Standing Balance Scale 2/5 Overall Complexity:MEDIUM 
 
G CODES:Mobility [id number] Current  CL= 60-79%   Goal  CL= 60-79%. The severity rating is based on the Other [facility] Balance Scale 2/5 [address] Standing Balance Scale 2/5 
0: Pt performs 25% or less of standing activity (Max assist) CN, 100% impaired. 1: Pt supports self with upper extremities but requires therapist assistance. Pt performs 25-50% of effort (Mod assist) CM, 80% to <100% impaired. 1+: Pt supports self with upper extremities but requires therapist assistance. Pt performs >50% effort. (Min assist). CL, 60% to <80% impaired. 2: Pt supports self independently with both upper extremities (walker, crutches, parallel bars). CL, 60% to <80% impaired. 2+: Pt support self independently with 1 upper extremity (cane, crutch, 1 parallel bar). CK, 40% to <60% impaired. 3: Pt stands without upper extremity support for up to 30 seconds. CK, 40% to <60% impaired. 3+: Pt stands without upper extremity support for 30 seconds or greater. CJ, 20% to <40% impaired. 4: Pt independently moves and returns center of gravity 1-2 inches in one plane. CJ, 20% to <40% impaired. 4+: Pt independently moves and returns center of gravity 1-2 inches in multiple planes. CI, 1% to <20% impaired. 5: Pt independently moves and returns center of gravity in all planes greater than 2 inches. CH, 0% impaired. Prior Level of Function/Home Situation:  
Home Situation Home Environment: Private residence One/Two Story Residence: Two story Living Alone: No 
Support Systems: Family member(s) Patient Expects to be Discharged to[de-identified] Rehabilitation facility Current DME Used/Available at Home: Cane, straight Critical Behavior: 
Neurologic State: Alert Orientation Level: Oriented X4 Cognition: Follows commands; Appropriate decision making Safety/Judgement: Fall prevention Psychosocial 
Patient Behaviors: Calm; Cooperative Family  Behaviors: Supportive Purposeful Interaction: Yes 
  
 
Manual Muscle Testing (LE) Deferred for functional activity d/t patient pain level. Patient demo's functional strength with mobility.  
_________________________________________________ Tone : normal  
Sensation: NT 
Range [facility] [place] Functional Mobility: 
 
 
Functional Status Indep (I) Mod I Super-vision Min A Mod A Max A Total A Assist x2 Verbal cues Additional time Not tested Comments Rolling []  []  [] []    []    []  []  [] [] [] [] Supine to sit []  []  [] []  []  [x]  []  [] [] [] [] Sit to supine []  []  [] []  []  [x]  []  [] [] [] [] Sit to stand []  []  [] []  []  [x]  []  [x] [] [] []   
Stand to sit []  []  [] []  []  [x]  []  [] [] [] [] Bed to chair transfers []  []  [] []  []  []  []  [] [] [] [] Balance Good Adron Hearing Poor Unable Not tested Comments Sitting static []  [x]  []  []  [] Sitting dynamic []  [x]  []  []  []   
Standing static []  [x]  []  []  []   
Standing dynamic []  [x]  []  []  []   
 
Pain: C and L spine Pre treatment pain level:10 Post treatment pain level:10 Vital Signs Temp: 98.6 °F (37 °C) Pulse (Heart Rate): 70 BP: 126/62 Resp Rate: 13    
O2 Sat (%): 100 % Activity Tolerance:  
Fair Please refer to the flowsheet for vital signs taken during this treatment. After treatment:  
[]         Patient left in no apparent distress sitting up in chair 
[x]         Patient left in no apparent distress in bed 
[x]         Call bell left within reach [x]         Nursing notified 
[x]         Caregiver present 
[]         Bed alarm activated COMMUNICATION/EDUCATION:  
[x]         Fall prevention education was provided and the patient/caregiver indicated understanding. [x]         Patient/family have participated as able in goal setting and plan of care. [x]         Patient/family agree to work toward stated goals and plan of care. []         Patient understands intent and goals of therapy, but is neutral about his/her participation. []         Patient is unable to participate in goal setting and plan of care. Recommendations for nursing: 
Written on communication board: up with assist x 1 Thank you for this referral. 
Ascencion Cornejo Time Calculation: 39 mins

## 2018-09-05 NOTE — PROGRESS NOTES
1315 pt given 5mg morphine for pain of \"100\". 1440 pt given 2 tabs percocet for pain of 10/10 
 
1640 pt's son, a dr at [employer], is requesting his father be on tele with cont pulsox. Will call dr Edgar Pantoja 1653 pt is now on tele with cont pulsox 1930 Bedside and Written shift change report given to araceli neal (oncoming nurse) by Tonya Wang RN 
 (offgoing nurse). Report included the following information Kardex, MAR and Recent Results.

## 2018-09-05 NOTE — PROGRESS NOTES
conducted a pre-surgery visit with Jonathan Quach, who is a 70 y.o.,male. The  provided the following Interventions: 
Initiated a relationship of care and support. Offered prayer and assurance of continued prayers on patient's behalf. Plan: 
Chaplains will continue to follow and will provide pastoral care on an as needed/requested basis.  recommends bedside caregivers page  on duty if patient shows signs of acute spiritual or emotional distress. Reiseñor 3 Board Certified [employer]  
[phone]

## 2018-09-05 NOTE — ANESTHESIA POSTPROCEDURE EVALUATION
Post-Anesthesia Evaluation and Assessment Patient: Maria E Hopkins MRN: [id number]  SSN: [id number] YOB: 1950  Age: 70 y.o. Sex: male Cardiovascular Function/Vital Signs Visit Vitals  /62  Pulse 70  Temp 37 °C (98.6 °F)  Resp 13  Ht 5' 6\" (1.676 m)  Wt 85.1 kg (187 lb 9 oz)  SpO2 100%  BMI 30.27 kg/m2 Patient is status post general anesthesia for Procedure(s): DECOMPRESSION L1-2 2-5, POSTEROLATERAL SPINE FUSION FLOSPINE L4-5. Nausea/Vomiting: None Postoperative hydration reviewed and adequate. Pain: 
Pain Scale 1: Numeric (0 - 10) (09/05/18 1311) Pain Intensity 1: 10 (09/05/18 1311) Managed Neurological Status:  
Neuro (WDL): Within Defined Limits (09/05/18 1111) At baseline Mental Status and Level of Consciousness: Alert and oriented Pulmonary Status:  
O2 Device: Nasal cannula (09/05/18 1300) Adequate oxygenation and airway patent Complications related to anesthesia: None Post-anesthesia assessment completed. No concerns Signed By: Anel Reyes MD   
 September 5, 2018

## 2018-09-05 NOTE — ROUTINE PROCESS
TRANSFER - IN REPORT: 
 
Verbal report received from amadou (edson) on Marilee Mercer  being received from pacu(unit) for routine post - op Report consisted of patients Situation, Background, Assessment and  
Recommendations(SBAR). Information from the following report(s) SBAR was reviewed with the receiving nurse. Opportunity for questions and clarification was provided.

## 2018-09-05 NOTE — OP NOTES
BRIEF OPERATIVE NOTE    Date of Procedure: 9/5/2018     Preoperative Diagnosis: STENOSIS SPONDYL M48.061 M43.16    Postoperative Diagnosis: STENOSIS SPONDYL M48.061 M43.16      Procedure: Procedure(s):  DECOMPRESSION L1-2 2-5, POSTEROLATERAL SPINE FUSION FLOSPINE L4-5    Surgeon(s) and Role:     * Taiwo Jay MD - Primary    Anesthesia: General     Findings: stenosis l1-5, deg spnkdylo l4-5     Estimated Blood Loss: 200  Replaced0      [id number]        Urine75    Specimens: * No specimens in log *     Tubes/Drains: None    Needle/sponge count:  Correct    Complications: 0    Plan    Dr Caty Soliman to follow  Up at [facility]  PT ambulate with tlso  Dc rivero in am  Home 2-3 days with tlso and percocet  rto 1 month

## 2018-09-06 LAB
BASOPHILS # BLD: 0 K/UL (ref 0–0.1)
BASOPHILS NFR BLD: 0 % (ref 0–2)
DIFFERENTIAL METHOD BLD: ABNORMAL
EOSINOPHIL # BLD: 0 K/UL (ref 0–0.4)
EOSINOPHIL NFR BLD: 0 % (ref 0–5)
ERYTHROCYTE [DISTWIDTH] IN BLOOD BY AUTOMATED COUNT: 16.7 % (ref 11.6–14.5)
GLUCOSE BLD STRIP.AUTO-MCNC: 155 MG/DL (ref 70–110)
GLUCOSE BLD STRIP.AUTO-MCNC: 177 MG/DL (ref 70–110)
GLUCOSE BLD STRIP.AUTO-MCNC: 205 MG/DL (ref 70–110)
GLUCOSE BLD STRIP.AUTO-MCNC: 212 MG/DL (ref 70–110)
HBA1C MFR BLD: 7 % (ref 4.2–5.6)
HCT VFR BLD AUTO: 29.9 % (ref 36–48)
HGB BLD-MCNC: 9.4 G/DL (ref 13–16)
LYMPHOCYTES # BLD: 1.4 K/UL (ref 0.9–3.6)
LYMPHOCYTES NFR BLD: 13 % (ref 21–52)
MCH RBC QN AUTO: 21.6 PG (ref 24–34)
MCHC RBC AUTO-ENTMCNC: 31.4 G/DL (ref 31–37)
MCV RBC AUTO: 68.7 FL (ref 74–97)
MONOCYTES # BLD: 1 K/UL (ref 0.05–1.2)
MONOCYTES NFR BLD: 9 % (ref 3–10)
NEUTS SEG # BLD: 8.6 K/UL (ref 1.8–8)
NEUTS SEG NFR BLD: 78 % (ref 40–73)
PLATELET # BLD AUTO: 193 K/UL (ref 135–420)
PMV BLD AUTO: 10 FL (ref 9.2–11.8)
RBC # BLD AUTO: 4.35 M/UL (ref 4.7–5.5)
WBC # BLD AUTO: 11.1 K/UL (ref 4.6–13.2)

## 2018-09-06 PROCEDURE — 83036 HEMOGLOBIN GLYCOSYLATED A1C: CPT | Performed by: ORTHOPAEDIC SURGERY

## 2018-09-06 PROCEDURE — 65660000000 HC RM CCU STEPDOWN

## 2018-09-06 PROCEDURE — 74011000250 HC RX REV CODE- 250: Performed by: INTERNAL MEDICINE

## 2018-09-06 PROCEDURE — 36415 COLL VENOUS BLD VENIPUNCTURE: CPT | Performed by: INTERNAL MEDICINE

## 2018-09-06 PROCEDURE — 82962 GLUCOSE BLOOD TEST: CPT

## 2018-09-06 PROCEDURE — 74011250636 HC RX REV CODE- 250/636: Performed by: INTERNAL MEDICINE

## 2018-09-06 PROCEDURE — 74011000258 HC RX REV CODE- 258: Performed by: INTERNAL MEDICINE

## 2018-09-06 PROCEDURE — 74011250637 HC RX REV CODE- 250/637: Performed by: INTERNAL MEDICINE

## 2018-09-06 PROCEDURE — 74011250636 HC RX REV CODE- 250/636: Performed by: ORTHOPAEDIC SURGERY

## 2018-09-06 PROCEDURE — 85025 COMPLETE CBC W/AUTO DIFF WBC: CPT | Performed by: INTERNAL MEDICINE

## 2018-09-06 PROCEDURE — 77010033678 HC OXYGEN DAILY

## 2018-09-06 PROCEDURE — 97530 THERAPEUTIC ACTIVITIES: CPT

## 2018-09-06 PROCEDURE — 74011636637 HC RX REV CODE- 636/637: Performed by: ORTHOPAEDIC SURGERY

## 2018-09-06 PROCEDURE — 74011250637 HC RX REV CODE- 250/637: Performed by: ORTHOPAEDIC SURGERY

## 2018-09-06 RX ORDER — OXYCODONE AND ACETAMINOPHEN 10; 325 MG/1; MG/1
1 TABLET ORAL
Status: DISCONTINUED | OUTPATIENT
Start: 2018-09-06 | End: 2018-09-07

## 2018-09-06 RX ORDER — MORPHINE SULFATE 2 MG/ML
2 INJECTION, SOLUTION INTRAMUSCULAR; INTRAVENOUS
Status: DISCONTINUED | OUTPATIENT
Start: 2018-09-06 | End: 2018-09-11

## 2018-09-06 RX ORDER — METOPROLOL TARTRATE 5 MG/5ML
10 INJECTION INTRAVENOUS ONCE
Status: COMPLETED | OUTPATIENT
Start: 2018-09-06 | End: 2018-09-06

## 2018-09-06 RX ORDER — METOPROLOL SUCCINATE 100 MG/1
100 TABLET, EXTENDED RELEASE ORAL 2 TIMES DAILY
Status: DISCONTINUED | OUTPATIENT
Start: 2018-09-06 | End: 2018-09-07

## 2018-09-06 RX ADMIN — Medication 10 ML: at 21:36

## 2018-09-06 RX ADMIN — OXYCODONE HYDROCHLORIDE AND ACETAMINOPHEN 1 TABLET: 10; 325 TABLET ORAL at 21:33

## 2018-09-06 RX ADMIN — ATORVASTATIN CALCIUM 40 MG: 40 TABLET, FILM COATED ORAL at 21:33

## 2018-09-06 RX ADMIN — SODIUM CHLORIDE 5 MG/HR: 900 INJECTION, SOLUTION INTRAVENOUS at 16:31

## 2018-09-06 RX ADMIN — Medication: at 20:40

## 2018-09-06 RX ADMIN — ACETAMINOPHEN 650 MG: 325 TABLET, FILM COATED ORAL at 23:01

## 2018-09-06 RX ADMIN — INSULIN LISPRO 4 UNITS: 100 INJECTION, SOLUTION INTRAVENOUS; SUBCUTANEOUS at 21:33

## 2018-09-06 RX ADMIN — MORPHINE SULFATE 4 MG: 2 INJECTION, SOLUTION INTRAMUSCULAR; INTRAVENOUS at 01:17

## 2018-09-06 RX ADMIN — INSULIN LISPRO 2 UNITS: 100 INJECTION, SOLUTION INTRAVENOUS; SUBCUTANEOUS at 10:03

## 2018-09-06 RX ADMIN — OXYCODONE HYDROCHLORIDE AND ACETAMINOPHEN 2 TABLET: 10; 325 TABLET ORAL at 13:17

## 2018-09-06 RX ADMIN — ONDANSETRON 4 MG: 2 INJECTION, SOLUTION INTRAMUSCULAR; INTRAVENOUS at 21:36

## 2018-09-06 RX ADMIN — Medication 10 ML: at 06:00

## 2018-09-06 RX ADMIN — ASPIRIN 81 MG: 81 TABLET, COATED ORAL at 09:59

## 2018-09-06 RX ADMIN — DOCUSATE SODIUM 100 MG: 100 CAPSULE, LIQUID FILLED ORAL at 10:00

## 2018-09-06 RX ADMIN — METOPROLOL TARTRATE 10 MG: 5 INJECTION, SOLUTION INTRAVENOUS at 14:58

## 2018-09-06 RX ADMIN — ACETAMINOPHEN 650 MG: 325 TABLET, FILM COATED ORAL at 15:18

## 2018-09-06 RX ADMIN — METOPROLOL SUCCINATE 100 MG: 100 TABLET, EXTENDED RELEASE ORAL at 18:32

## 2018-09-06 RX ADMIN — INSULIN LISPRO 4 UNITS: 100 INJECTION, SOLUTION INTRAVENOUS; SUBCUTANEOUS at 18:32

## 2018-09-06 RX ADMIN — OXYCODONE HYDROCHLORIDE AND ACETAMINOPHEN 2 TABLET: 10; 325 TABLET ORAL at 09:29

## 2018-09-06 RX ADMIN — Medication: at 10:29

## 2018-09-06 RX ADMIN — RANOLAZINE 500 MG: 500 TABLET, FILM COATED, EXTENDED RELEASE ORAL at 10:00

## 2018-09-06 RX ADMIN — CLINDAMYCIN PHOSPHATE 600 MG: 600 INJECTION, SOLUTION INTRAVENOUS at 10:00

## 2018-09-06 RX ADMIN — RANOLAZINE 500 MG: 500 TABLET, FILM COATED, EXTENDED RELEASE ORAL at 18:32

## 2018-09-06 RX ADMIN — MORPHINE SULFATE 4 MG: 2 INJECTION, SOLUTION INTRAMUSCULAR; INTRAVENOUS at 09:54

## 2018-09-06 RX ADMIN — INSULIN LISPRO 2 UNITS: 100 INJECTION, SOLUTION INTRAVENOUS; SUBCUTANEOUS at 13:17

## 2018-09-06 RX ADMIN — OXYCODONE HYDROCHLORIDE AND ACETAMINOPHEN 2 TABLET: 10; 325 TABLET ORAL at 04:57

## 2018-09-06 RX ADMIN — Medication 10 ML: at 15:07

## 2018-09-06 RX ADMIN — ONDANSETRON 4 MG: 2 INJECTION, SOLUTION INTRAMUSCULAR; INTRAVENOUS at 15:18

## 2018-09-06 RX ADMIN — METOPROLOL SUCCINATE 50 MG: 50 TABLET, EXTENDED RELEASE ORAL at 10:06

## 2018-09-06 NOTE — PROGRESS NOTES
Ortho rounds complete with Adelina KAHN. Patient stated had a bad night, pain not well controled. Per Adelina will adjust medications as needed. Encouraged OOB with assist and back brace, use of ICS. Plan of care discharge to home 1-2 days once medically stable. Nursing to follow.

## 2018-09-06 NOTE — ROUTINE PROCESS
Bedside and Verbal shift change report given to [facility] (oncoming nurse) by Yuliet Leal (offgoing nurse). Report included the following information SBAR, Kardex, Intake/Output, MAR and Recent Results. 1020 Pt resting in bed. Complaints of pain have been addressed. Will continue to monitor. 1322 Administered Percocet for pt's complaint of pain and temperature of 101.1. Will recheck pt's temp. 1430 Pt's heart rate elevated 140's-150's. Dr. Zeina Malloy at bedside. Pt placed on 2L O2 nasal cannula. 1458 Administered IV Metoprolol. Pt's heart rate currently 147-150. Informed telemetry monitoring. 1505 Pt's heart rate currently 130's. Will continue to monitor. 0 Pt to be transferred to [facility] for [facility] gtt.  
 
1600 Informed pt's wife to notify nursing and allow staff to empty urine so it can be measured. Wife acknowledged. 1630 Transferred pt to . Bedside verbal report received by Xavi Berry. Report consisted of situation, background, assessment, and recommendation. Pt stable and IV's intact and wife with pt during transfer.

## 2018-09-06 NOTE — PROGRESS NOTES
Problem: Falls - Risk of 
Goal: *Absence of Falls Document Sammy Jarvis Fall Risk and appropriate interventions in the flowsheet. Outcome: Progressing Towards Goal 
Fall Risk Interventions: 
Mobility Interventions: Patient to call before getting OOB, PT Consult for mobility concerns, PT Consult for assist device competence, Utilize walker, cane, or other assistive device, Communicate number of staff needed for ambulation/transfer Medication Interventions: Patient to call before getting OOB, Evaluate medications/consider consulting pharmacy Elimination Interventions: Call light in reach, Patient to call for help with toileting needs History of Falls Interventions: Door open when patient unattended, Evaluate medications/consider consulting pharmacy Problem: Pressure Injury - Risk of 
Goal: *Prevention of pressure injury Document Alireza Scale and appropriate interventions in the flowsheet. Outcome: Progressing Towards Goal 
Pressure Injury Interventions: Activity Interventions: Increase time out of bed, Pressure redistribution bed/mattress(bed type), PT/OT evaluation Mobility Interventions: HOB 30 degrees or less, Pressure redistribution bed/mattress (bed type), PT/OT evaluation Nutrition Interventions: Document food/fluid/supplement intake

## 2018-09-06 NOTE — PROGRESS NOTES
1936: Second PT treatment session attempted. URIEL Cronin at bedside. Patient's HR tachycardic in the 140s. Not appropriate for mobility at this time. Will f/u with patient on 9/7. Bre Lin PT, DPT Office extension: [phone] Pager #: [phone]

## 2018-09-06 NOTE — PROGRESS NOTES
Problem: Discharge Planning Goal: *Discharge to safe environment Outcome: Progressing Towards Goal 
snf

## 2018-09-06 NOTE — PROGRESS NOTES
1950 Received bedside and verbal shift change report from prince edward isl, RN report included the following information SBAR, Kardex, Intake/Output and MAR. Pt in the bed with spouse at the bedside complaining surgical site pain and requested to be repositioned and pt was also complaining being cold and shivering repositioned and extra blanket provided. VS stable no fever noted. Pt voiced better after extra blanket and resting spouse remain at the bedside and call bell within pt reach will medicate for pain as needed. 2025 Pt stable no more complaint of shivering spouse remain at the bedside call bell within pt reach. 2108 Tolerated scheduled meds and medicated with prn percocet. Resting denied acute distress call bell within reach and spouse at the bedside. 2245 Remain stable repositioned for comfort and resting call bell within reach. 0117 PRN morphine administered for pain 8/10 and repositioned for comfort call bell within pt reach and spouse remain at the bedside. 0206 Sleeping soundly with spouse at the bedside call bell within pt reach. 0457 PRN percocet administered for pain 10/10 and pt repositioned for comfort, spouse remain at the bedside call bell within pt reach. 1273 Bedside and Verbal shift change report given to Pavan Kline RN (oncoming nurse) by Viktor Teague RN (offgoing nurse). Report included the following information SBAR, Kardex, Intake/Output, MAR and Recent Results.

## 2018-09-06 NOTE — PROGRESS NOTES
Problem: Mobility Impaired (Adult and Pediatric) Goal: *Acute Goals and Plan of Care (Insert Text) Physical Therapy Goals Initiated 9/5/2018 and to be accomplished within 7 days. 1.  Patient will complete all bed mobility with minimal assistance/contact guard assist in order to prepare for EOB/OOB activity. 2.  Patient will perform sit <> stand with supervision/set-up in order to prepare for OOB/gait activity. 3.  Patient will perform bed to chair transfers with minimal assistance/contact guard assist in order to promote mobility and encourage seated activity to progress towards their prior level of function. 4.  Patient will ambulate 50 feet with supervision/set-up using LRAD in order to prepare for safe negotiation of their environment. Outcome: Progressing Towards Goal 
 
PHYSICAL THERAPY: Daily TREATMENT Note INPATIENT: Medicare: Hospital Day: 2 Patient: Ba Andres (4 y.o. male)    Date: 9/6/2018 Primary Diagnosis: STENOSIS SPONDYL M48.061 M43.16 Lumbar stenosis Procedure(s) (LRB): 
DECOMPRESSION L1-2 2-5, POSTEROLATERAL SPINE FUSION FLOSPINE L4-5 (N/A), 1 Day Post-Op, Precautions: Spinal 
 
Chart, physical therapy assessment, plan of care and goals were reviewed. PLOF: Independent ASSESSMENT: 
Patient supine in bed, agreeable to transferring to recliner for lunch. TLSO present in room and adjusted to patient. MAX A x 1 for rolling and supine <> sit with verbal cuing for strategy. Fair balance with EOB activity. Patient refusing to attempt sit <> stand once seated with c/o back pain. Returned to bed and positioned for comfort. MAX x 3 for scooting towards head of bed. Left sitting up with lunch tray in front. Provided fan for comfort. Progression toward goals: 
      Improving slowly and progressing toward goals PLAN: 
Patient continues to benefit from skilled intervention to address the above impairments. Continue treatment per established plan of care. EDUCATION:  
Education:  Patient was educated on the following topics: strategy for bed mobility and transfers. Needs reinforcement. Barriers to Learning/Limitations: yes;  none Compensate with: visual, verbal, tactile, kinesthetic cues/model Discharge Recommendations:  Rehab Further Equipment Recommendations for Discharge:  rolling walker Factors which may impact discharge planning: N/A  
 
SUBJECTIVE:  
Patient stated I can't do this.  OBJECTIVE DATA SUMMARY:  
Critical Behavior: 
Neurologic State: Drowsy Orientation Level: Oriented X4 Cognition: Follows commands Safety/Judgement: Fall prevention G CODE:Mobility [id number] Current  CL= 60-79%   Goal  CL= 60-79%. The severity rating is based on the Other [facility] Sitting Balance Scale 2/5 [facility] Sitting Balance Scale 2/5 
0: Pt performs 25% or less of sitting activity (Max assist) CN, 100% impaired. 1: Pt supports self with upper extremities but requires therapist assistance. Pt performs 25-50% of effort (Mod assist) CM, 80% to <100% impaired. 1+: Pt supports self with upper extremities but requires therapist assistance. Pt performs >50% effort. (Min assist). CL, 60% to <80% impaired. 2: Pt supports self independently with both upper extremities. CL, 60% to <80% impaired. 2+: Pt support self independently with 1 upper extremity. CK, 40% to <60% impaired. 3: Pt sits without upper extremity support for up to 30 seconds. CK, 40% to <60% impaired. 3+: Pt sits without upper extremity support for 30 seconds or greater. CJ, 20% to <40% impaired. 4: Pt moves and returns trunkal midpoint 1-2 inches in one plane. CJ, 20% to <40% impaired. 4+: Pt moves and returns trunkal midpoint 1-2 inches in multiple planes. CI, 1% to <20% impaired. 5: Pt moves and returns trunkal midpoint in all planes greater than 2 inches. CH, 0% impaired. Functional Mobility: 
 
 
Functional Status Indep (I) Mod I Super-vision Min A Mod A Max A Total A Assist x2 Verbal cues Additional time Not tested Comments Rolling []  []  [] []    []    []  []  [] [] [] [x] Supine to sit []  []  [] []  []  [x]  []  [] [] [] [] Sit to supine []  []  [] []  []  [x]  []  [] [] [] [] Sit to stand []  []  [] []  []  []  []  [] [] [] [x] Stand to sit []  []  [] []  []  []  []  [] [] [] [x] Bed to chair transfers []  []  [] []  []  []  []  [] [] [] [x] Balance Good Rose Angelita Poor Unable Not tested Comments Sitting static []  [x]  []  []  [] Sitting dynamic []  [x]  []  []  []   
Standing static []  []  []  [x]  [] Standing dynamic []  []  []  [x]  [] Vital Signs Temp: (!) 101.1 °F (38.4 °C) Pulse (Heart Rate): 94    
BP: 116/68 Resp Rate: 18    
O2 Sat (%): 93 % Pain:Back pain, no rating provided. Activity Tolerance:  
Fair After treatment:  
Patient left in no apparent distress in bed Call bell left within reach Nursing notified William Hagan Time Calculation: 24 mins

## 2018-09-06 NOTE — PROGRESS NOTES
Progress Note Post Operative Day: 1 Assessment: 1. Status post  LAMINEC/FACETECT/FORAMIN,LUMBAR [49875] (SPINE LUMBAR POSTERIOR INTERBODY FUSION (PLIF)) LAMINEC/FACETECT/FORAMIN,EACH ADDNL [84197] GA ARTHRODESIS POSTERIOR/POSTEROLATERAL LUMBAR Roselia Douglas Bonds [[id number]] SPIN BONE AUTOGRFT LOCAL [[id number]] for STENOSIS SPONDYL M48.061 M43.16 Lumbar stenosis 9/5/2018,   Progressing. PLAN:   
1. Mobilize. Continue P.T. 
2. Brace 3. Discharge Planning home 1-2 days HPI: Dariela Claros is a 70 y.o. male patient without new complaints status post fusion for STENOSIS SPONDYL M48.061 M43.16 Lumbar stenosis 9/5/2018. No new orthopaedic changes. Blood pressure 140/63, pulse 88, temperature 98 °F (36.7 °C), resp. rate 18, height 5' 6\" (1.676 m), weight 85.1 kg (187 lb 9 oz), SpO2 93 %. CBC w/Diff Lab Results Component Value Date/Time WBC 11.1 09/06/2018 05:25 AM  
 RBC 4.35 (L) 09/06/2018 05:25 AM  
 HCT 29.9 (L) 09/06/2018 05:25 AM  
  
 
 
Physical Assessment: 
General: in no apparent distress Extremities:  Neurovascular intact Dressing:  Dry DVT Exam:   No exam evidence to suggest DVT. Compartments soft and NT.  
  
  
 
- Adelina Lewis PA-C 
9/6/2018 Office [phone] Bbhk [phone] Agree; PT/OT; brace; pain control; home in 1-2 days based on progress

## 2018-09-06 NOTE — PROGRESS NOTES
PROGRESS NOTE Patient: Maximiliano Rogers MRN: [id number]  CSN: [id number] YOB: 1950  Age: 70 y.o. Sex: male DOA: 9/5/2018 LOS:  LOS: 1 day Diagnosis: L/S STENOSIS SPONDYL M48.061 M43.16   
   
Procedure: 
DECOMPRESSION L1-2 2-5, POSTEROLATERAL SPINE FUSION FLOSPINE L4-5 
 
HPI:  
Some drowsiness and hiccups; pain controlled No CP SOB Mehta out; voiding ACTIVE MEDICAL PROBLEMS/PMH/PSH 
3-vessel coronary artery disease with chronically occluded right coronary artery. He had previous stent to the proximal circumflex artery with significant disease distal left circumflex and distal LAD. 
  
Paroxysmal atrial fibrillation Chronic OAC with xarelto HTN 
T2DM 
  
LEXISCAN NUCLEAR STRESS TEST 8/21/2018 Findings: 
the stress myocardial perfusion study shows mild decreased perfusion inferior wall and inferolateral wall, the resting study shows normal myocardial perfusion. The EKG gated study shows normal wall motion normal contractility the calculated ejection fraction is 65% 
  
Impressions: 
1 abnormal myocardial perfusion with inferolateral ischemia 
2 normal left ventricular systolic function 
  
Meniere's disease 
  
     
Past Surgical History:  
Procedure Laterality Date  HX HEART CATHETERIZATION      
  Stent  HX ORTHOPAEDIC      
  cervical sx  
  
Hospital Problems  Never Reviewed Codes Class Noted POA Lumbar stenosis ICD-10-CM: M48.061 
ICD-9-CM: 724.02  9/5/2018 Unknown Patient Vitals for the past 24 hrs: 
 Temp Pulse Resp BP SpO2  
09/06/18 1136 (!) 101.1 °F (38.4 °C) 94 18 116/68 93 % 09/06/18 1004 - 92 - 149/76 -  
09/06/18 0815 98 °F (36.7 °C) 88 18 - 93 % 09/06/18 0348 99.4 °F (37.4 °C) 89 18 140/63 93 % 09/05/18 2258 98.8 °F (37.1 °C) 85 18 112/66 92 % 09/05/18 2108 98.2 °F (36.8 °C) 94 16 159/75 98 % ROS: 
Constitutional:  No chills . No headache. Neck: No pain. Cardiac: No CP. SOB. No palpitation Respiratory: No cough . No SOB. No wheezing GI: no nausea . No vomiting. No reflux. No abdominal pain. Normal BM . : No hematuria. M/S: + back pain Neuro: No numbness. No Weakness Skin:No itching PHYSICAL EXAM: 
GENERAL: Drowsy HEENT:    
[place] LUNGS: CTA. BS Normal Bilaterally HEART: Irreg/Irreg; PCOIX  6-8/7  Aortic systolic murmur No S3 S4 ABDOMEN: Soft. Normal BS. No tenderness. LE: No edema. SKIN: No rash. No ecchymosis.  
  
 
 
 
Intake/Output Summary (Last 24 hours) at 18 1418 Last data filed at 18 9213 Gross per 24 hour Intake          2690.83 ml Output             3555 ml Net          -864.17 ml  
 
 
Current Shift:    
 
Last three shifts:   1901 -  0700 In: 5180.8 [P.O.:960; I.V.:4220.8] Out: 3930 [HNKY] Recent Results (from the past 24 hour(s)) GLUCOSE, POC Collection Time: 18 10:57 PM  
Result Value Ref Range Glucose (POC) 167 (H) 70 - 110 mg/dL CBC WITH AUTOMATED DIFF Collection Time: 18  5:25 AM  
Result Value Ref Range WBC 11.1 4.6 - 13.2 K/uL  
 RBC 4.35 (L) 4.70 - 5.50 M/uL HGB 9.4 (L) 13.0 - 16.0 g/dL HCT 29.9 (L) 36.0 - 48.0 % MCV 68.7 (L) 74.0 - 97.0 FL  
 MCH 21.6 (L) 24.0 - 34.0 PG  
 MCHC 31.4 31.0 - 37.0 g/dL  
 RDW 16.7 (H) 11.6 - 14.5 % PLATELET 486 931 - 164 K/uL MPV 10.0 9.2 - 11.8 FL  
 NEUTROPHILS 78 (H) 40 - 73 % LYMPHOCYTES 13 (L) 21 - 52 % MONOCYTES 9 3 - 10 % EOSINOPHILS 0 0 - 5 % BASOPHILS 0 0 - 2 %  
 ABS. NEUTROPHILS 8.6 (H) 1.8 - 8.0 K/UL  
 ABS. LYMPHOCYTES 1.4 0.9 - 3.6 K/UL  
 ABS. MONOCYTES 1.0 0.05 - 1.2 K/UL  
 ABS. EOSINOPHILS 0.0 0.0 - 0.4 K/UL  
 ABS. BASOPHILS 0.0 0.0 - 0.1 K/UL  
 DF AUTOMATED    
GLUCOSE, POC Collection Time: 18  6:47 AM  
Result Value Ref Range Glucose (POC) 155 (H) 70 - 110 mg/dL GLUCOSE, POC Collection Time: 18 11:42 AM  
Result Value Ref Range Glucose (POC) 177 (H) 70 - 110 mg/dL Lab Results Component Value Date/Time Glucose 197 (H) 12/22/2010 05:45 AM  
 Glucose 245 (H) 12/20/2010 05:45 AM  
 Glucose 181 (H) 12/05/2010 06:00 AM  
 Glucose 153 (H) 12/04/2010 04:05 AM  
  
ASSESSMENT A-Fib with RVR Drowsiness Spiked temp Post op pain controlled Post op anemia 3 vessels CAD Preserved LVEF Abnormal myocardial perfusion with inferolateral ischemia [address] on hold 
T2DM HTN 
 
PLAN Lopressor 10 mg IV now Increase lopressor Adjust pain meds CBC Addendum A-fib With RVR persists after  Lopressor IV Transferring to tele floor for IV cardizem Discussed with wife Rogelio Wiley MD 
9/6/2018, 2:18 PM

## 2018-09-06 NOTE — ROUTINE PROCESS
1630 - Patient transferred to 3009 from 2200 - wife at bedside. On tele #35 with Afib on monitor - cardizem gtt started @ 5mg/hr per orders. MEWS of 4 noted - no further action required at this time - will continue to monitor 36 - family at bedside -

## 2018-09-06 NOTE — OP NOTES
[address] REPORT    Name:LUIS EDUARDO MCCAIN  MR#: [id number]  : 1950  ACCOUNT #: [de-identified]   DATE OF SERVICE: 2018    PREOPERATIVE DIAGNOSES:    1. Lumbar stenosis L1-L5. 2.  Degenerative spondylolisthesis, L4-5. POSTOPERATIVE DIAGNOSES:  1. Lumbar stenosis L1-L5. 2.  Degenerative spondylolisthesis, L4-5. PROCEDURES PERFORMED:  1. Lumbar decompression L1-5.  2.  Bilateral exploration and decompression of the L1, 2, 3, 4 and 5 nerve roots with foraminotomy and partial facetectomy. 3.  FloSpine pedicle instrumentation L4-5.  4.  Bilateral posterolateral spinal fusion, L4-5 with local bone graft, bone bank bone graft and autologous growth factor. SURGEON:  MD Aneudy Porter    ANESTHESIA:  General.    FINDINGS:    1. The patient had central subarticular stenosis L1-L5.  2.  The patient had degenerative spondylolisthesis, L4-L5. PROCEDURE:  Under general anesthesia, the patient rolled in the prone position on the Mc frame, peripheral nerves padded. Back prepped and draped in a sterile fashion. Midline incision made L1-5. Muscle subperiosteally exposed lateral to facet capsules, L1-L4 and to the transverse processes L4 and 5. Radiograph taken for localization of position. The inferior 2/3 of the L1 lamina, the L2 lamina, 3 lamina, 4 lamina, superior portion of L5 lamina were removed with Leksell and Kerrison rongeurs. Decompression line to the L1, 2, 3, 4 and 5 pedicles in the respective L1, 2, 3, 4 and 5 nerve roots, decompressed bilaterally around the pedicles into the foramen, performed foraminotomy and partial facetectomy. At the end of the decompression, ball probe could be placed around the pedicles into the foramen without nerve root compromise. Pedicle holes made bilaterally L4-5 under direct visual control. Holes probed with depth gauge, felt to be within bone. Metallic markers placed.   Radiographs obtained and were satisfactory. Beginning first on the left hand side, posterolateral elements L4-5 decorticated with Midas Nemesio drill, followed by placement of bone graft material and pedicle screws. In similar fashion, right hand side decorticated bone graft and pedicle screws placed. Prone PA and lateral radiographs obtained and were satisfactory. Rods placed bilaterally followed by set screws which were tightened and torqued to 120 inch pounds. Final radiographs obtained and were satisfactory and she had partial reduction of the spondylolisthesis. Gelfoam placed over the exposed dura. Additional bone graft placed about the rods and then the wound closed in layers with powdered vancomycin in deep subcutaneous tissue. Wound dressed. Patient awakened and taken to recovery room in satisfactory condition without complication. ESTIMATED BLOOD LOSS:  200 mL. FLUIDS:  The patient received 2000 mL of crystalloid fluid. URINE OUTPUT:   75 mL. SPECIMENS REMOVED: None. TUBES AND DRAINS:  None. COUNTS:  Needle and sponge count correct. COMPLICATIONS:  Without complication. IMPLANTS:  00    At the time of dictation, patient not awakened sufficiently to test motor sensation. MD Concha Salmon  D: 09/05/2018 11:13     T: 09/05/2018 21:40  JOB #: [id number]  CC: WALLY Arias III, MD  CC: [phone] [address] Evin Anton Clay

## 2018-09-06 NOTE — PROGRESS NOTES
Care Management Interventions PCP Verified by CM: Yes 
Palliative Care Criteria Met (RRAT>21 & CHF Dx)?: No 
Transition of Care Consult (CM Consult): Discharge Planning Physical Therapy Consult: Yes Current Support Network: Lives with Spouse Plan discussed with Pt/Family/Caregiver: Yes Discharge Location Discharge Placement: Skilled nursing facility Reason for Admission:   Status post  LAMINEC/FACETECT/FORAMIN,LUMBAR [14424] (SPINE LUMBAR POSTERIOR INTERBODY FUSION (PLIF)) LAMINEC/FACETECT/FORAMIN,EACH ADDNL [48965] AL ARTHRODESIS POSTERIOR/POSTEROLATERAL LUMBAR Roselia Dadoreen Polo Bonds [[id number]] SPIN BONE AUTOGRFT LOCAL [08224] for STENOSIS SPONDYL M48.061 M43.16 Lumbar stenosis 9/5/2018,   Progressing. 
  
                
RRAT Score:      green Plan for utilizing home health:      Patient requesting SNF Likelihood of Readmission:  green Transition of Care Plan:   
 
Spoke with patient in room, he stated that he lives with his wife. Patient was mostly independent with his care and gets assistance from his wife  He uses a walker at home. He stated that his daughter had arranged for him to have a person that comes once a month to help with chores. He state that he wants to go to rehab. He stated that he he had been at [facility] before and it is his first choice and he agreeable to [facility] [place] as well. Patient has designated ___spouse_____________________ to participate in his/her discharge plan and to receive any needed information. Sherrie Clock [phone]. He verified his address, insurance, PCP ([address] [place] [address] he not sure about MD's name.)  and phone # as correct on the facesheet. Will match to [facility] and [facility] [place]. Patient will need three overnights admit date is 9/5/18 discharge 9/8/18.

## 2018-09-06 NOTE — ROUTINE PROCESS
Bedside and Verbal shift change report given to [address] [place] [address] Road[address] (oncoming nurse) by Tom Sow RN (offgoing nurse). Report included the following information SBAR, Kardex and MAR.

## 2018-09-07 ENCOUNTER — APPOINTMENT (OUTPATIENT)
Dept: GENERAL RADIOLOGY | Age: 68
DRG: 460 | End: 2018-09-07
Attending: INTERNAL MEDICINE
Payer: MEDICARE

## 2018-09-07 PROBLEM — E11.9 DM (DIABETES MELLITUS) (HCC): Status: ACTIVE | Noted: 2018-09-07

## 2018-09-07 PROBLEM — I10 HTN (HYPERTENSION): Status: ACTIVE | Noted: 2018-09-07

## 2018-09-07 PROBLEM — K56.7 ILEUS (HCC): Status: ACTIVE | Noted: 2018-09-07

## 2018-09-07 PROBLEM — D50.9 MICROCYTIC ANEMIA: Status: ACTIVE | Noted: 2018-09-07

## 2018-09-07 PROBLEM — R06.6 HICCUP: Status: ACTIVE | Noted: 2018-09-07

## 2018-09-07 PROBLEM — I48.0 PAROXYSMAL A-FIB (HCC): Status: ACTIVE | Noted: 2018-09-07

## 2018-09-07 PROBLEM — R33.9 URINE RETENTION: Status: ACTIVE | Noted: 2018-09-07

## 2018-09-07 PROBLEM — I25.10 CAD (CORONARY ARTERY DISEASE): Status: ACTIVE | Noted: 2018-09-07

## 2018-09-07 LAB
ANION GAP SERPL CALC-SCNC: 10 MMOL/L (ref 3–18)
APPEARANCE UR: CLEAR
BACTERIA URNS QL MICRO: NEGATIVE /HPF
BASOPHILS # BLD: 0 K/UL (ref 0–0.1)
BASOPHILS # BLD: 0 K/UL (ref 0–0.1)
BASOPHILS NFR BLD: 0 % (ref 0–2)
BASOPHILS NFR BLD: 0 % (ref 0–2)
BILIRUB UR QL: NEGATIVE
BUN SERPL-MCNC: 10 MG/DL (ref 7–18)
BUN/CREAT SERPL: 10 (ref 12–20)
CALCIUM SERPL-MCNC: 7.8 MG/DL (ref 8.5–10.1)
CHLORIDE SERPL-SCNC: 100 MMOL/L (ref 100–108)
CO2 SERPL-SCNC: 27 MMOL/L (ref 21–32)
COLOR UR: YELLOW
CREAT SERPL-MCNC: 0.98 MG/DL (ref 0.6–1.3)
DIFFERENTIAL METHOD BLD: ABNORMAL
DIFFERENTIAL METHOD BLD: ABNORMAL
EOSINOPHIL # BLD: 0 K/UL (ref 0–0.4)
EOSINOPHIL # BLD: 0 K/UL (ref 0–0.4)
EOSINOPHIL NFR BLD: 0 % (ref 0–5)
EOSINOPHIL NFR BLD: 0 % (ref 0–5)
EPITH CASTS URNS QL MICRO: NEGATIVE /LPF (ref 0–5)
ERYTHROCYTE [DISTWIDTH] IN BLOOD BY AUTOMATED COUNT: 17 % (ref 11.6–14.5)
ERYTHROCYTE [DISTWIDTH] IN BLOOD BY AUTOMATED COUNT: 17 % (ref 11.6–14.5)
FERRITIN SERPL-MCNC: 59 NG/ML (ref 8–388)
GLUCOSE BLD STRIP.AUTO-MCNC: 137 MG/DL (ref 70–110)
GLUCOSE BLD STRIP.AUTO-MCNC: 155 MG/DL (ref 70–110)
GLUCOSE BLD STRIP.AUTO-MCNC: 186 MG/DL (ref 70–110)
GLUCOSE SERPL-MCNC: 122 MG/DL (ref 74–99)
GLUCOSE UR STRIP.AUTO-MCNC: NEGATIVE MG/DL
HCT VFR BLD AUTO: 29.3 % (ref 36–48)
HCT VFR BLD AUTO: 33.5 % (ref 36–48)
HGB BLD-MCNC: 10.2 G/DL (ref 13–16)
HGB BLD-MCNC: 9.2 G/DL (ref 13–16)
HGB UR QL STRIP: ABNORMAL
IRON SATN MFR SERPL: 6 %
IRON SERPL-MCNC: 16 UG/DL (ref 50–175)
KETONES UR QL STRIP.AUTO: 15 MG/DL
LEUKOCYTE ESTERASE UR QL STRIP.AUTO: NEGATIVE
LYMPHOCYTES # BLD: 1.2 K/UL (ref 0.9–3.6)
LYMPHOCYTES # BLD: 3 K/UL (ref 0.9–3.6)
LYMPHOCYTES NFR BLD: 11 % (ref 21–52)
LYMPHOCYTES NFR BLD: 19 % (ref 21–52)
MCH RBC QN AUTO: 21.6 PG (ref 24–34)
MCH RBC QN AUTO: 21.9 PG (ref 24–34)
MCHC RBC AUTO-ENTMCNC: 30.4 G/DL (ref 31–37)
MCHC RBC AUTO-ENTMCNC: 31.4 G/DL (ref 31–37)
MCV RBC AUTO: 69.8 FL (ref 74–97)
MCV RBC AUTO: 71 FL (ref 74–97)
MONOCYTES # BLD: 0.9 K/UL (ref 0.05–1.2)
MONOCYTES # BLD: 1.7 K/UL (ref 0.05–1.2)
MONOCYTES NFR BLD: 11 % (ref 3–10)
MONOCYTES NFR BLD: 9 % (ref 3–10)
MUCOUS THREADS URNS QL MICRO: NEGATIVE /LPF
NEUTS SEG # BLD: 10.9 K/UL (ref 1.8–8)
NEUTS SEG # BLD: 8.4 K/UL (ref 1.8–8)
NEUTS SEG NFR BLD: 70 % (ref 40–73)
NEUTS SEG NFR BLD: 80 % (ref 40–73)
NITRITE UR QL STRIP.AUTO: NEGATIVE
PH UR STRIP: 6.5 [PH] (ref 5–8)
PLATELET # BLD AUTO: 161 K/UL (ref 135–420)
PLATELET # BLD AUTO: 209 K/UL (ref 135–420)
PMV BLD AUTO: 10.4 FL (ref 9.2–11.8)
PMV BLD AUTO: 9.6 FL (ref 9.2–11.8)
POTASSIUM SERPL-SCNC: 4.6 MMOL/L (ref 3.5–5.5)
PROT UR STRIP-MCNC: ABNORMAL MG/DL
RBC # BLD AUTO: 4.2 M/UL (ref 4.7–5.5)
RBC # BLD AUTO: 4.72 M/UL (ref 4.7–5.5)
RBC #/AREA URNS HPF: ABNORMAL /HPF (ref 0–5)
SODIUM SERPL-SCNC: 137 MMOL/L (ref 136–145)
SP GR UR REFRACTOMETRY: 1.01 (ref 1–1.03)
TIBC SERPL-MCNC: 272 UG/DL (ref 250–450)
TSH SERPL DL<=0.05 MIU/L-ACNC: 0.68 UIU/ML (ref 0.36–3.74)
UROBILINOGEN UR QL STRIP.AUTO: 0.2 EU/DL (ref 0.2–1)
WBC # BLD AUTO: 10.6 K/UL (ref 4.6–13.2)
WBC # BLD AUTO: 15.7 K/UL (ref 4.6–13.2)
WBC URNS QL MICRO: ABNORMAL /HPF (ref 0–4)

## 2018-09-07 PROCEDURE — C9113 INJ PANTOPRAZOLE SODIUM, VIA: HCPCS | Performed by: INTERNAL MEDICINE

## 2018-09-07 PROCEDURE — 74011000250 HC RX REV CODE- 250: Performed by: INTERNAL MEDICINE

## 2018-09-07 PROCEDURE — 74011250636 HC RX REV CODE- 250/636: Performed by: INTERNAL MEDICINE

## 2018-09-07 PROCEDURE — 81001 URINALYSIS AUTO W/SCOPE: CPT | Performed by: INTERNAL MEDICINE

## 2018-09-07 PROCEDURE — 74011000258 HC RX REV CODE- 258: Performed by: INTERNAL MEDICINE

## 2018-09-07 PROCEDURE — 74011636637 HC RX REV CODE- 636/637: Performed by: ORTHOPAEDIC SURGERY

## 2018-09-07 PROCEDURE — 36415 COLL VENOUS BLD VENIPUNCTURE: CPT | Performed by: INTERNAL MEDICINE

## 2018-09-07 PROCEDURE — 82728 ASSAY OF FERRITIN: CPT | Performed by: INTERNAL MEDICINE

## 2018-09-07 PROCEDURE — 82962 GLUCOSE BLOOD TEST: CPT

## 2018-09-07 PROCEDURE — 77010033678 HC OXYGEN DAILY

## 2018-09-07 PROCEDURE — 74011250637 HC RX REV CODE- 250/637: Performed by: INTERNAL MEDICINE

## 2018-09-07 PROCEDURE — 77030037878 HC DRSG MEPILEX >48IN BORD MOLN -B

## 2018-09-07 PROCEDURE — 65610000006 HC RM INTENSIVE CARE

## 2018-09-07 PROCEDURE — 74011250636 HC RX REV CODE- 250/636: Performed by: ORTHOPAEDIC SURGERY

## 2018-09-07 PROCEDURE — 77030034849

## 2018-09-07 PROCEDURE — 71045 X-RAY EXAM CHEST 1 VIEW: CPT

## 2018-09-07 PROCEDURE — 85025 COMPLETE CBC W/AUTO DIFF WBC: CPT | Performed by: INTERNAL MEDICINE

## 2018-09-07 PROCEDURE — 80048 BASIC METABOLIC PNL TOTAL CA: CPT | Performed by: INTERNAL MEDICINE

## 2018-09-07 PROCEDURE — 74019 RADEX ABDOMEN 2 VIEWS: CPT

## 2018-09-07 PROCEDURE — 83540 ASSAY OF IRON: CPT | Performed by: INTERNAL MEDICINE

## 2018-09-07 PROCEDURE — 51798 US URINE CAPACITY MEASURE: CPT

## 2018-09-07 PROCEDURE — 84443 ASSAY THYROID STIM HORMONE: CPT | Performed by: INTERNAL MEDICINE

## 2018-09-07 RX ORDER — FACIAL-BODY WIPES
10 EACH TOPICAL
Status: COMPLETED | OUTPATIENT
Start: 2018-09-07 | End: 2018-09-07

## 2018-09-07 RX ORDER — METOPROLOL TARTRATE 5 MG/5ML
5 INJECTION INTRAVENOUS EVERY 6 HOURS
Status: DISCONTINUED | OUTPATIENT
Start: 2018-09-07 | End: 2018-09-09

## 2018-09-07 RX ORDER — INSULIN LISPRO 100 [IU]/ML
INJECTION, SOLUTION INTRAVENOUS; SUBCUTANEOUS EVERY 6 HOURS
Status: DISCONTINUED | OUTPATIENT
Start: 2018-09-08 | End: 2018-09-09

## 2018-09-07 RX ORDER — SODIUM CHLORIDE 9 MG/ML
100 INJECTION, SOLUTION INTRAVENOUS CONTINUOUS
Status: DISCONTINUED | OUTPATIENT
Start: 2018-09-07 | End: 2018-09-10

## 2018-09-07 RX ORDER — KETOROLAC TROMETHAMINE 15 MG/ML
15 INJECTION, SOLUTION INTRAMUSCULAR; INTRAVENOUS EVERY 6 HOURS
Status: COMPLETED | OUTPATIENT
Start: 2018-09-07 | End: 2018-09-08

## 2018-09-07 RX ORDER — ACETAMINOPHEN 650 MG/1
650 SUPPOSITORY RECTAL
Status: DISCONTINUED | OUTPATIENT
Start: 2018-09-07 | End: 2018-09-09

## 2018-09-07 RX ORDER — CHLORPROMAZINE HYDROCHLORIDE 25 MG/ML
10 INJECTION INTRAMUSCULAR
Status: DISCONTINUED | OUTPATIENT
Start: 2018-09-07 | End: 2018-09-11

## 2018-09-07 RX ORDER — CHLORPROMAZINE HYDROCHLORIDE 25 MG/1
25 TABLET, FILM COATED ORAL
Status: DISCONTINUED | OUTPATIENT
Start: 2018-09-07 | End: 2018-09-07

## 2018-09-07 RX ADMIN — SODIUM CHLORIDE 50 ML/HR: 900 INJECTION, SOLUTION INTRAVENOUS at 15:23

## 2018-09-07 RX ADMIN — OXYCODONE HYDROCHLORIDE AND ACETAMINOPHEN 1 TABLET: 10; 325 TABLET ORAL at 10:20

## 2018-09-07 RX ADMIN — RANOLAZINE 500 MG: 500 TABLET, FILM COATED, EXTENDED RELEASE ORAL at 08:57

## 2018-09-07 RX ADMIN — Medication 10 ML: at 15:25

## 2018-09-07 RX ADMIN — Medication: at 07:25

## 2018-09-07 RX ADMIN — MORPHINE SULFATE 2 MG: 2 INJECTION, SOLUTION INTRAMUSCULAR; INTRAVENOUS at 20:32

## 2018-09-07 RX ADMIN — SODIUM CHLORIDE 5 MG/HR: 900 INJECTION, SOLUTION INTRAVENOUS at 07:28

## 2018-09-07 RX ADMIN — DIAZEPAM 5 MG: 5 TABLET ORAL at 10:22

## 2018-09-07 RX ADMIN — KETOROLAC TROMETHAMINE 15 MG: 15 INJECTION, SOLUTION INTRAMUSCULAR; INTRAVENOUS at 19:01

## 2018-09-07 RX ADMIN — ASPIRIN 81 MG: 81 TABLET, COATED ORAL at 08:58

## 2018-09-07 RX ADMIN — SODIUM CHLORIDE 40 MG: 9 INJECTION, SOLUTION INTRAMUSCULAR; INTRAVENOUS; SUBCUTANEOUS at 12:57

## 2018-09-07 RX ADMIN — SODIUM CHLORIDE 5 MG/HR: 900 INJECTION, SOLUTION INTRAVENOUS at 20:35

## 2018-09-07 RX ADMIN — BISACODYL 10 MG: 10 SUPPOSITORY RECTAL at 12:57

## 2018-09-07 RX ADMIN — METOPROLOL TARTRATE 5 MG: 1 INJECTION, SOLUTION INTRAVENOUS at 17:38

## 2018-09-07 RX ADMIN — METOPROLOL SUCCINATE 100 MG: 100 TABLET, EXTENDED RELEASE ORAL at 08:58

## 2018-09-07 RX ADMIN — OXYCODONE HYDROCHLORIDE AND ACETAMINOPHEN 1 TABLET: 10; 325 TABLET ORAL at 04:58

## 2018-09-07 RX ADMIN — Medication 10 ML: at 22:19

## 2018-09-07 RX ADMIN — DIAZEPAM 5 MG: 5 TABLET ORAL at 22:22

## 2018-09-07 RX ADMIN — DOCUSATE SODIUM 100 MG: 100 CAPSULE, LIQUID FILLED ORAL at 08:57

## 2018-09-07 RX ADMIN — INSULIN LISPRO 2 UNITS: 100 INJECTION, SOLUTION INTRAVENOUS; SUBCUTANEOUS at 17:45

## 2018-09-07 RX ADMIN — MORPHINE SULFATE 2 MG: 2 INJECTION, SOLUTION INTRAMUSCULAR; INTRAVENOUS at 08:54

## 2018-09-07 RX ADMIN — METOPROLOL TARTRATE 5 MG: 1 INJECTION, SOLUTION INTRAVENOUS at 12:57

## 2018-09-07 RX ADMIN — CHLORPROMAZINE HYDROCHLORIDE 25 MG: 25 TABLET, SUGAR COATED ORAL at 10:36

## 2018-09-07 RX ADMIN — MORPHINE SULFATE 2 MG: 2 INJECTION, SOLUTION INTRAMUSCULAR; INTRAVENOUS at 00:57

## 2018-09-07 NOTE — PROGRESS NOTES
Spoke with Raphael Andrea from [address] she can accept patient once medically cleared and has three overnights

## 2018-09-07 NOTE — PROGRESS NOTES
Problem: Falls - Risk of 
Goal: *Absence of Falls Document Concepcion Messer Fall Risk and appropriate interventions in the flowsheet. Outcome: Progressing Towards Goal 
Fall Risk Interventions: 
Mobility Interventions: Assess mobility with egress test 
 
  
 
Medication Interventions: Bed/chair exit alarm Elimination Interventions: Bed/chair exit alarm History of Falls Interventions: Bed/chair exit alarm Problem: Pressure Injury - Risk of 
Goal: *Prevention of pressure injury Document Alireza Scale and appropriate interventions in the flowsheet. Outcome: Progressing Towards Goal 
Pressure Injury Interventions: Activity Interventions: Pressure redistribution bed/mattress(bed type) Mobility Interventions: Pressure redistribution bed/mattress (bed type) Nutrition Interventions: Document food/fluid/supplement intake

## 2018-09-07 NOTE — MANAGEMENT PLAN
Discharge/Transition Planning Patient accepted to [facility]. Now on Cardizem robert Sushma from [facility] will review again on Monday. Pt does not appear medically ready to discharge Albert Maier RN BSN Outcomes Manager Pager # [phone]

## 2018-09-07 NOTE — PROGRESS NOTES
General Internal Medicine/Geriatrics Patient: Neelima Sullivan MRN: [id number]  CSN: [id number] YOB: 1950  Age: 70 y.o. Sex: male DOA: 9/5/2018 LOS:  LOS: 2 days Subjective:  
 
C/o Low back pain Abd. Distension Hiccups No flatus Review of Systems: 
Eyes: negative Ears, Nose, Mouth, Throat, and Face: negative Respiratory: negative for dyspnea on exertion Cardiovascular: negative for chest pain Gastrointestinal: negative for nausea, vomiting and diarrhea Genitourinary:negative for dysuria and hematuria Integument/Breast: negative Hematologic/Lymphatic: negative for bleeding Musculoskeletal:negative for arthralgias Neurological: negative for weakness Behavioral/Psychiatric: negative for behavior problems Endocrine: negative for temperature intolerance Allergic/Immunologic: negative for hay fever Objective:  
 
Physical Exam: 
Patient Vitals for the past 24 hrs: 
 Temp Pulse Resp BP SpO2  
09/07/18 0857 99.9 °F (37.7 °C) 88 20 140/76 95 % 09/07/18 0447 99.5 °F (37.5 °C) (!) 110 18 149/76 96 % 09/07/18 0014 100.2 °F (37.9 °C) - - - -  
09/06/18 2254 (!) 100.9 °F (38.3 °C) 92 18 132/66 95 % 09/06/18 1951 99.9 °F (37.7 °C) (!) 142 16 115/75 94 % 09/06/18 1752 98.6 °F (37 °C) (!) 133 16 118/74 97 % 09/06/18 1632 98.6 °F (37 °C) (!) 144 18 130/64 96 % 09/06/18 1458 - (!) 153 - 113/67 -  
09/06/18 1442 99.7 °F (37.6 °C) (!) 148 17 113/79 94 % Afib, RVR HEENT: wnl NECK:  No venous distention or adenopathy HEART: RRR, no rubs or gallops LUNGS: Clear to auscultation ABDOMEN: distended, decreased BS 
EXT: warm,no edema SKIN: Normal turgor, no breaks NEURO: Awake, alert, non focal 
 
PVR> 400 CC Intake and Output: 
Current Shift:    
Last three shifts:  09/05 1901 - 09/07 0700 In: 3689.9 [P.O.:1190; I.V.:2499.9] Out: 5263 [[facility]:2246] Recent Results (from the past 24 hour(s)) GLUCOSE, POC  Collection Time: 09/06/18  4:37 PM  
 Result Value Ref Range Glucose (POC) 205 (H) 70 - 110 mg/dL GLUCOSE, POC Collection Time: 09/06/18  8:32 PM  
Result Value Ref Range Glucose (POC) 212 (H) 70 - 110 mg/dL CBC WITH AUTOMATED DIFF Collection Time: 09/07/18  3:55 AM  
Result Value Ref Range WBC 15.7 (H) 4.6 - 13.2 K/uL  
 RBC 4.72 4.70 - 5.50 M/uL  
 HGB 10.2 (L) 13.0 - 16.0 g/dL HCT 33.5 (L) 36.0 - 48.0 % MCV 71.0 (L) 74.0 - 97.0 FL  
 MCH 21.6 (L) 24.0 - 34.0 PG  
 MCHC 30.4 (L) 31.0 - 37.0 g/dL  
 RDW 17.0 (H) 11.6 - 14.5 % PLATELET 681 702 - 113 K/uL MPV 10.4 9.2 - 11.8 FL  
 NEUTROPHILS 70 40 - 73 % LYMPHOCYTES 19 (L) 21 - 52 % MONOCYTES 11 (H) 3 - 10 % EOSINOPHILS 0 0 - 5 % BASOPHILS 0 0 - 2 %  
 ABS. NEUTROPHILS 10.9 (H) 1.8 - 8.0 K/UL  
 ABS. LYMPHOCYTES 3.0 0.9 - 3.6 K/UL  
 ABS. MONOCYTES 1.7 (H) 0.05 - 1.2 K/UL  
 ABS. EOSINOPHILS 0.0 0.0 - 0.4 K/UL  
 ABS. BASOPHILS 0.0 0.0 - 0.1 K/UL  
 DF AUTOMATED METABOLIC PANEL, BASIC Collection Time: 09/07/18  3:55 AM  
Result Value Ref Range Sodium 137 136 - 145 mmol/L Potassium 4.6 3.5 - 5.5 mmol/L Chloride 100 100 - 108 mmol/L  
 CO2 27 21 - 32 mmol/L Anion gap 10 3.0 - 18 mmol/L Glucose 122 (H) 74 - 99 mg/dL BUN 10 7.0 - 18 MG/DL Creatinine 0.98 0.6 - 1.3 MG/DL  
 BUN/Creatinine ratio 10 (L) 12 - 20 GFR est AA >60 >60 ml/min/1.73m2 GFR est non-AA >60 >60 ml/min/1.73m2 Calcium 7.8 (L) 8.5 - 10.1 MG/DL  
GLUCOSE, POC Collection Time: 09/07/18  8:34 AM  
Result Value Ref Range Glucose (POC) 137 (H) 70 - 110 mg/dL GLUCOSE, POC Collection Time: 09/07/18 11:38 AM  
Result Value Ref Range Glucose (POC) 186 (H) 70 - 110 mg/dL No results found. Current Facility-Administered Medications Medication Dose Route Frequency  chlorproMAZINE (THORAZINE) tablet 25 mg  25 mg Oral Q8H PRN  
 metoprolol (LOPRESSOR) injection 5 mg  5 mg IntraVENous Q6H  
  0.9% sodium chloride infusion  50 mL/hr IntraVENous CONTINUOUS  
 bisacodyl (DULCOLAX) suppository 10 mg  10 mg Rectal NOW  pantoprazole (PROTONIX) 40 mg in sodium chloride 0.9% 10 mL injection  40 mg IntraVENous Q24H  
 morphine injection 2 mg  2 mg IntraVENous Q3H PRN  
 dilTIAZem (CARDIZEM) 100 mg in 0.9% sodium chloride (MBP/ADV) 100 mL infusion  0-15 mg/hr IntraVENous CONTINUOUS  
 lidocaine (PF) (XYLOCAINE) 10 mg/mL (1 %) injection 0.1 mL  0.1 mL SubCUTAneous PRN  
 sodium chloride (NS) flush 5-10 mL  5-10 mL IntraVENous Q8H  
 sodium chloride (NS) flush 5-10 mL  5-10 mL IntraVENous PRN  
 ondansetron (ZOFRAN) injection 4 mg  4 mg IntraVENous Q4H PRN  
 0.9% sodium chloride with KCl 20 mEq/L infusion   IntraVENous CONTINUOUS  
 acetaminophen (TYLENOL) tablet 650 mg  650 mg Oral Q4H PRN  
 diazePAM (VALIUM) tablet 5 mg  5 mg Oral Q6H PRN  
 insulin lispro (HUMALOG) injection   SubCUTAneous AC&HS  aspirin delayed-release tablet 81 mg  81 mg Oral DAILY  nitroglycerin (NITRODUR) 0.4 mg/hr patch 1 Patch  1 Patch TransDERmal DAILY  ranolazine ER (RANEXA) tablet 500 mg  500 mg Oral BID  magnesium hydroxide (MILK OF MAGNESIA) 400 mg/5 mL oral suspension 30 mL  30 mL Oral DAILY PRN  
 Please clarify the patient's dose, frequency and if metoprolol if succinate or tartrate  1 Each Other DAILY Lab Results Component Value Date/Time Glucose 122 (H) 09/07/2018 03:55 AM  
 Glucose 197 (H) 12/22/2010 05:45 AM  
 Glucose 245 (H) 12/20/2010 05:45 AM  
 Glucose 181 (H) 12/05/2010 06:00 AM  
 Glucose 153 (H) 12/04/2010 04:05 AM  
  
 
Assessment Active Problems: 
  Lumbar stenosis (9/5/2018) Paroxysmal A-fib ([facility] [address] (9/7/2018) Microcytic anemia (9/7/2018) DM (diabetes mellitus) ([facility] [address] (9/7/2018) HTN (hypertension) (9/7/2018) CAD (coronary artery disease) (9/7/2018) Hiccup (9/7/2018) Ileus ([facility] [address] (9/7/2018) Urine retention (9/7/2018) Plan Stepdown Check Labs including TSH, U/A Iron Profile PCXR and abd. Films NPO 
IV cardizem, B.B IV fluids, PPI Dulcolax DC Percocet ? Toradol when OK with Dr. Dirk Castillo D/W wife and daughter Gilda Groves MD 
9/7/2018, 12:16 PM

## 2018-09-07 NOTE — ROUTINE PROCESS
1945  Bedside and Verbal shift change report given to Kay Jo (oncoming nurse) by Yovana Hewitt (offgoing nurse). Report included the following information SBAR, Kardex, Intake/Output and MAR. Pt awake but drowsy, wife at bedside. HR 130s Afib on monitor. Cardizem drip started @1630 
 
2130  Pt repositioned for comfort and PRN pain medication given. 2300  Pt now in SR with PACs in 90s for an hour, confirmed with Radha Merrill, [employer] tech 
 
0130  Pt HR up and down from 70-150s Sinus with PACs. Dr. María Lopez notified. Pt still on cardizem 5mg/hr and IVF @100ml/hr, bp WNL, and pain managed. Per Dr. María Lopez, no new orders at this time 1408  Reassessment complete. Pt repositioned for comfort and PRN pain medication given. HR 70s. Will continue to monitor 
 
0720  Bedside and Verbal shift change report given to Reagan Sauceda (oncoming nurse) by Kay Jo (offgoing nurse). Report included the following information SBAR, Kardex, Intake/Output and MAR.

## 2018-09-07 NOTE — PROGRESS NOTES
9814: Patient currently on Cardizem drip, mobility contraindicated at this time. Will f/u with patient for afternoon therapy session. 1319: Patient still on Cardizem drip to control HR. Will f/u with patient on 9/8. Mary Mora PT, DPT Office extension: [phone] Pager #: [phone]

## 2018-09-07 NOTE — PROGRESS NOTES
1430 - pt received from [facility] on 4L NC and titratable Cardizem gtt, unable to arouse, Dr. Day Session paged regarding pt change in [facility] - Dr. Wiggins Hearing in to see pt, made aware of change in LOC, no new orders received, waiting on Dr. Shay Shane to return page 1500 - Dr. Day Session returned page, no new orders received regarding pt cardiac status or LOC 
 
1600 - resting comfortably in bed, on Cardizem gtt, temperature recorded 100.6, ice packs applied to underarms, family at bedside 1800 - Dr. Day Session at bedside to assess pt, new orders received 1915 - Bedside and Verbal shift change report given to Lopez Briceno RN (oncoming nurse) by Vipul Tarango (offgoing nurse). Report included the following information SBAR, Kardex, Intake/Output, MAR, Recent Results and Cardiac Rhythm A fib.

## 2018-09-07 NOTE — PROGRESS NOTES
1605:  Administered morning medications,  
 
1007:  Call placed to CRISS Sahu regarding patients uncontrolled pain. Dilaudid 0.5-1 mg Q 3H PRN. Directed to [facility] regarding hiccups. 1015:  Call placed to [facility] regarding continuous hiccups. 1031: [facility] returned call; Thorazine 25 mg Q8 hr PRN ordered 1038: Administered thorazine,  
 
1059:  Patients abdomen distended/bladder scan >441 
 
1120: Lisa on unit for rounds; orders for rivero insertion for retention 1225: Rivero placed for retention/urine collected. Orders for stepdown. 1330: At bedside with Xray tech assistant patient with positioning of xrays. Patient verbally in pain \"stating this is not my best day\". Reassured  Patient test will be over soon and he will be transferring to ICU for higher level of care; wife at bedside. 1342:  TRANSFER - OUT REPORT: 
 
Verbal report given to Liliam Kelley RN(name) on Marilee Mercer  being transferred to ICU(unit) for routine progression of care Report consisted of patients Situation, Background, Assessment and  
Recommendations(SBAR). Information from the following report(s) SBAR, Kardex, [facility] and Recent Results was reviewed with the receiving nurse. Lines:  
Peripheral IV 09/05/18 Left Hand (Active) Site Assessment Clean, dry, & intact 9/7/2018  3:00 AM  
Phlebitis Assessment 0 9/7/2018  3:00 AM  
Infiltration Assessment 0 9/7/2018  3:00 AM  
Dressing Status Clean, dry, & intact 9/7/2018  3:00 AM  
Dressing Type Transparent 9/7/2018  3:00 AM  
Hub Color/Line Status Patent; Infusing 9/7/2018  3:00 AM  
Action Taken Open ports on tubing capped 9/7/2018  3:00 AM  
Alcohol Cap Used Yes 9/7/2018  3:00 AM  
   
Peripheral IV 09/05/18 Right Hand (Active) Site Assessment Clean, dry, & intact 9/7/2018  3:00 AM  
Phlebitis Assessment 0 9/7/2018  3:00 AM  
Infiltration Assessment 0 9/7/2018  3:00 AM  
Dressing Status Clean, dry, & intact 9/7/2018  3:00 AM  
 Dressing Type Transparent 9/7/2018  3:00 AM  
Hub Color/Line Status Patent; Infusing 9/7/2018  3:00 AM  
Action Taken Open ports on tubing capped 9/7/2018  3:00 AM  
Alcohol Cap Used Yes 9/7/2018  3:00 AM  
  
 
Opportunity for questions and clarification was provided. Patient transported with: 
 Monitor O2 @ 3 liters 1430:  Patient transferred to ICU with O2 and portable monitor. Patient appears drowsy from AM pain medication/muscle relaxer, can be aroused with sternal rub and talking from ICU RN. Patient did not sleep at all throughout the previous night, neither did wife at bedside. 1445:  Call placed to Adelina NP to inform of patients condition & transfer to ICU.

## 2018-09-07 NOTE — PROGRESS NOTES
Orthopaedics Patient without new complaints status post LAMINEC/FACETECT/FORAMIN,LUMBAR [04738] (SPINE LUMBAR POSTERIOR INTERBODY FUSION (PLIF)) LAMINEC/FACETECT/FORAMIN,EACH ADDNL [61313] [place] ARTHRODESIS POSTERIOR/POSTEROLATERAL LUMBAR Donalee Kennedy Zimmer [[id number]] SPIN BONE AUTOGR[employer] LOCAL [88226] for STENOSIS SPONDYL M48.061 M43.16 Lumbar stenosis 9/5/2018. Pt transferred to ICU because of intermittent tachycardia. Is on cardiazem drip Mehta reinserted today because of inability to void. Pt denies pain Visit Vitals  /58 (BP 1 Location: Right arm, BP Patient Position: At rest)  Pulse 77  Temp 98.2 °F (36.8 °C)  Resp 20  
 Ht 5' 6\" (1.676 m)  Wt 85.1 kg (187 lb 9 oz)  SpO2 96%  BMI 30.27 kg/m2 CBC w/Diff Lab Results Component Value Date/Time WBC 10.6 09/07/2018 12:43 PM  
 RBC 4.20 (L) 09/07/2018 12:43 PM  
 HCT 29.3 (L) 09/07/2018 12:43 PM  
 MCV 69.8 (L) 09/07/2018 12:43 PM  
 MCH 21.9 (L) 09/07/2018 12:43 PM  
 MCHC 31.4 09/07/2018 12:43 PM  
 RDW 17.0 (H) 09/07/2018 12:43 PM  
 Lab Results Component Value Date/Time MONOS 9 09/07/2018 12:43 PM  
 EOS 0 09/07/2018 12:43 PM  
 BASOS 0 09/07/2018 12:43 PM  
 RDW 17.0 (H) 09/07/2018 12:43 PM  
  
 
 
Physical exam:  Pt lethargic but will open eyes, answer questions and recognizes myself  Dressing dry. AT, GS intact  Bilateral Lower Extremities. Calves soft and nontender. Assessment:  Status post LAMINEC/FACETECT/FORAMIN,LUMBAR [[id number]] (SPINE LUMBAR POSTERIOR INTERBODY FUSION (PLIF)) LAMINEC/FACETECT/FORAMIN,EACH ADDNL [12121] [place] ARTHRODESIS POSTERIOR/POSTEROLATERAL LUMBAR Donalee Kennedy Zimmer [[id number]] SPIN [employer]ONE AUTOGR[employer] LOCAL [62046] for STENOSIS SPONDYL M48.061 M43.16 Lumbar stenosis 9/5/2018,  Ortho stable Tachycardia PLAN:  Cont ICU with cardiazem drip-at 5mg/hr now. Pulse 77 now.   Dr Hurtado Spine aware and transferred pt to ICU 
 
 Will hold pain med until the pt is more responsive. Will cont NPO. Cont rivero and remove on Sunday if pt doing well. Thomas Méndez MD 
September 7, 2018

## 2018-09-07 NOTE — PROGRESS NOTES
Progress Note Post Operative Day: 1 Assessment: 1. Status post  LAMINEC/FACETECT/FORAMIN,LUMBAR [[id number]] (SPINE LUMBAR POSTERIOR INTERBODY FUSION (PLIF)) LAMINEC/FACETECT/FORAMIN,EACH ADDNL [60860] [place] ARTHRODESIS POSTERIOR/POSTEROLATERAL LUMBAR Verl Boby Gill [[id number]] SPIN BONE AUTOGRFT LOCAL [10401] for STENOSIS SPONDYL M48.061 M43.16 Lumbar stenosis 9/5/2018,   Progressing. PLAN:   
1. Mobilize. Continue P.T. 
2. Brace 3. Medical management per IM. Appreciate assistance 4. Discharge Planning home when medically stable HPI: Davion Son is a 70 y.o. male patient without new complaints status post fusion for STENOSIS SPONDYL M48.061 M43.16 Lumbar stenosis 9/5/2018. No new orthopaedic changes. Blood pressure 140/76, pulse 88, temperature 99.9 °F (37.7 °C), resp. rate 20, height 5' 6\" (1.676 m), weight 85.1 kg (187 lb 9 oz), SpO2 95 %. CBC w/Diff Lab Results Component Value Date/Time WBC 15.7 (H) 09/07/2018 03:55 AM  
 RBC 4.72 09/07/2018 03:55 AM  
 HCT 33.5 (L) 09/07/2018 03:55 AM  
  
 
 
Physical Assessment: 
General: in no apparent distress Extremities:  Neurovascular intact Dressing:  dry DVT Exam:   No exam evidence to suggest DVT. Compartments soft and NT.  
  
 
- Adelina Lewis PA-C 
9/7/2018 Office [phone] [place] [phone]

## 2018-09-07 NOTE — PROGRESS NOTES
Problem: Falls - Risk of 
Goal: *Absence of Falls Document Ayla Shabazz Fall Risk and appropriate interventions in the flowsheet. Outcome: Progressing Towards Goal 
Fall Risk Interventions: 
Mobility Interventions: Patient to call before getting OOB, PT Consult for mobility concerns, PT Consult for assist device competence, Utilize walker, cane, or other assistive device Medication Interventions: Patient to call before getting OOB, Teach patient to arise slowly Elimination Interventions: Call light in reach History of Falls Interventions: Evaluate medications/consider consulting pharmacy, Door open when patient unattended Problem: Pressure Injury - Risk of 
Goal: *Prevention of pressure injury Document Alireza Scale and appropriate interventions in the flowsheet. Outcome: Progressing Towards Goal 
Pressure Injury Interventions: Activity Interventions: Pressure redistribution bed/mattress(bed type) Mobility Interventions: Pressure redistribution bed/mattress (bed type) Nutrition Interventions: Document food/fluid/supplement intake

## 2018-09-08 ENCOUNTER — APPOINTMENT (OUTPATIENT)
Dept: GENERAL RADIOLOGY | Age: 68
DRG: 460 | End: 2018-09-08
Attending: INTERNAL MEDICINE
Payer: MEDICARE

## 2018-09-08 LAB
ALBUMIN SERPL-MCNC: 2.6 G/DL (ref 3.4–5)
ALBUMIN/GLOB SERPL: 0.7 {RATIO} (ref 0.8–1.7)
ALP SERPL-CCNC: 59 U/L (ref 45–117)
ALT SERPL-CCNC: 24 U/L (ref 16–61)
ANION GAP SERPL CALC-SCNC: 9 MMOL/L (ref 3–18)
AST SERPL-CCNC: 36 U/L (ref 15–37)
BILIRUB SERPL-MCNC: 0.9 MG/DL (ref 0.2–1)
BUN SERPL-MCNC: 12 MG/DL (ref 7–18)
BUN/CREAT SERPL: 12 (ref 12–20)
CALCIUM SERPL-MCNC: 7.4 MG/DL (ref 8.5–10.1)
CHLORIDE SERPL-SCNC: 100 MMOL/L (ref 100–108)
CO2 SERPL-SCNC: 27 MMOL/L (ref 21–32)
CREAT SERPL-MCNC: 0.98 MG/DL (ref 0.6–1.3)
GLOBULIN SER CALC-MCNC: 3.6 G/DL (ref 2–4)
GLUCOSE BLD STRIP.AUTO-MCNC: 144 MG/DL (ref 70–110)
GLUCOSE BLD STRIP.AUTO-MCNC: 151 MG/DL (ref 70–110)
GLUCOSE BLD STRIP.AUTO-MCNC: 151 MG/DL (ref 70–110)
GLUCOSE SERPL-MCNC: 132 MG/DL (ref 74–99)
POTASSIUM SERPL-SCNC: 4.1 MMOL/L (ref 3.5–5.5)
PROT SERPL-MCNC: 6.2 G/DL (ref 6.4–8.2)
SODIUM SERPL-SCNC: 136 MMOL/L (ref 136–145)

## 2018-09-08 PROCEDURE — 77030037870 HC GLD SHT PREVALON SAGE -B

## 2018-09-08 PROCEDURE — 74011250637 HC RX REV CODE- 250/637: Performed by: INTERNAL MEDICINE

## 2018-09-08 PROCEDURE — 77010033678 HC OXYGEN DAILY

## 2018-09-08 PROCEDURE — 97530 THERAPEUTIC ACTIVITIES: CPT

## 2018-09-08 PROCEDURE — 82962 GLUCOSE BLOOD TEST: CPT

## 2018-09-08 PROCEDURE — 74011250636 HC RX REV CODE- 250/636: Performed by: INTERNAL MEDICINE

## 2018-09-08 PROCEDURE — 87040 BLOOD CULTURE FOR BACTERIA: CPT | Performed by: INTERNAL MEDICINE

## 2018-09-08 PROCEDURE — 74011636637 HC RX REV CODE- 636/637: Performed by: ORTHOPAEDIC SURGERY

## 2018-09-08 PROCEDURE — 71045 X-RAY EXAM CHEST 1 VIEW: CPT

## 2018-09-08 PROCEDURE — 65610000006 HC RM INTENSIVE CARE

## 2018-09-08 PROCEDURE — 74011000250 HC RX REV CODE- 250: Performed by: INTERNAL MEDICINE

## 2018-09-08 PROCEDURE — 80053 COMPREHEN METABOLIC PANEL: CPT | Performed by: INTERNAL MEDICINE

## 2018-09-08 PROCEDURE — 74011000258 HC RX REV CODE- 258: Performed by: INTERNAL MEDICINE

## 2018-09-08 PROCEDURE — 36415 COLL VENOUS BLD VENIPUNCTURE: CPT | Performed by: INTERNAL MEDICINE

## 2018-09-08 PROCEDURE — 74011250636 HC RX REV CODE- 250/636: Performed by: ORTHOPAEDIC SURGERY

## 2018-09-08 PROCEDURE — 74011250637 HC RX REV CODE- 250/637: Performed by: ORTHOPAEDIC SURGERY

## 2018-09-08 PROCEDURE — C9113 INJ PANTOPRAZOLE SODIUM, VIA: HCPCS | Performed by: INTERNAL MEDICINE

## 2018-09-08 RX ORDER — FACIAL-BODY WIPES
10 EACH TOPICAL
Status: COMPLETED | OUTPATIENT
Start: 2018-09-08 | End: 2018-09-08

## 2018-09-08 RX ORDER — LEVOFLOXACIN 5 MG/ML
500 INJECTION, SOLUTION INTRAVENOUS EVERY 24 HOURS
Status: DISCONTINUED | OUTPATIENT
Start: 2018-09-09 | End: 2018-09-11

## 2018-09-08 RX ADMIN — ONDANSETRON 4 MG: 2 INJECTION, SOLUTION INTRAMUSCULAR; INTRAVENOUS at 09:10

## 2018-09-08 RX ADMIN — MORPHINE SULFATE 2 MG: 2 INJECTION, SOLUTION INTRAMUSCULAR; INTRAVENOUS at 04:05

## 2018-09-08 RX ADMIN — SODIUM CHLORIDE 50 ML/HR: 900 INJECTION, SOLUTION INTRAVENOUS at 03:54

## 2018-09-08 RX ADMIN — METOPROLOL TARTRATE 5 MG: 1 INJECTION, SOLUTION INTRAVENOUS at 12:32

## 2018-09-08 RX ADMIN — MORPHINE SULFATE 2 MG: 2 INJECTION, SOLUTION INTRAMUSCULAR; INTRAVENOUS at 09:11

## 2018-09-08 RX ADMIN — KETOROLAC TROMETHAMINE 15 MG: 15 INJECTION, SOLUTION INTRAMUSCULAR; INTRAVENOUS at 23:03

## 2018-09-08 RX ADMIN — Medication 10 ML: at 15:00

## 2018-09-08 RX ADMIN — CHLORPROMAZINE HYDROCHLORIDE 10 MG: 25 INJECTION INTRAMUSCULAR at 04:05

## 2018-09-08 RX ADMIN — Medication 10 ML: at 21:07

## 2018-09-08 RX ADMIN — CHLORPROMAZINE HYDROCHLORIDE 10 MG: 25 INJECTION INTRAMUSCULAR at 21:02

## 2018-09-08 RX ADMIN — INSULIN LISPRO 2 UNITS: 100 INJECTION, SOLUTION INTRAVENOUS; SUBCUTANEOUS at 15:29

## 2018-09-08 RX ADMIN — KETOROLAC TROMETHAMINE 15 MG: 15 INJECTION, SOLUTION INTRAMUSCULAR; INTRAVENOUS at 12:24

## 2018-09-08 RX ADMIN — ASPIRIN 81 MG: 81 TABLET, COATED ORAL at 10:47

## 2018-09-08 RX ADMIN — METOPROLOL TARTRATE 5 MG: 1 INJECTION, SOLUTION INTRAVENOUS at 17:25

## 2018-09-08 RX ADMIN — RANOLAZINE 500 MG: 500 TABLET, FILM COATED, EXTENDED RELEASE ORAL at 10:46

## 2018-09-08 RX ADMIN — SODIUM CHLORIDE 10 MG/HR: 900 INJECTION, SOLUTION INTRAVENOUS at 22:56

## 2018-09-08 RX ADMIN — METOPROLOL TARTRATE 5 MG: 1 INJECTION, SOLUTION INTRAVENOUS at 00:31

## 2018-09-08 RX ADMIN — SODIUM CHLORIDE 6 MG/HR: 900 INJECTION, SOLUTION INTRAVENOUS at 03:54

## 2018-09-08 RX ADMIN — METOPROLOL TARTRATE 5 MG: 1 INJECTION, SOLUTION INTRAVENOUS at 23:03

## 2018-09-08 RX ADMIN — BISACODYL 10 MG: 10 SUPPOSITORY RECTAL at 12:24

## 2018-09-08 RX ADMIN — MORPHINE SULFATE 2 MG: 2 INJECTION, SOLUTION INTRAMUSCULAR; INTRAVENOUS at 21:02

## 2018-09-08 RX ADMIN — RANOLAZINE 500 MG: 500 TABLET, FILM COATED, EXTENDED RELEASE ORAL at 17:25

## 2018-09-08 RX ADMIN — METOPROLOL TARTRATE 5 MG: 1 INJECTION, SOLUTION INTRAVENOUS at 06:10

## 2018-09-08 RX ADMIN — CHLORPROMAZINE HYDROCHLORIDE 10 MG: 25 INJECTION INTRAMUSCULAR at 09:11

## 2018-09-08 RX ADMIN — RIVAROXABAN 20 MG: 20 TABLET, FILM COATED ORAL at 17:25

## 2018-09-08 RX ADMIN — KETOROLAC TROMETHAMINE 15 MG: 15 INJECTION, SOLUTION INTRAMUSCULAR; INTRAVENOUS at 06:10

## 2018-09-08 RX ADMIN — SODIUM CHLORIDE 40 MG: 9 INJECTION, SOLUTION INTRAMUSCULAR; INTRAVENOUS; SUBCUTANEOUS at 12:24

## 2018-09-08 RX ADMIN — Medication 10 ML: at 06:11

## 2018-09-08 RX ADMIN — KETOROLAC TROMETHAMINE 15 MG: 15 INJECTION, SOLUTION INTRAMUSCULAR; INTRAVENOUS at 00:43

## 2018-09-08 RX ADMIN — INSULIN LISPRO 2 UNITS: 100 INJECTION, SOLUTION INTRAVENOUS; SUBCUTANEOUS at 17:36

## 2018-09-08 RX ADMIN — SODIUM CHLORIDE 10 MG/HR: 900 INJECTION, SOLUTION INTRAVENOUS at 15:00

## 2018-09-08 RX ADMIN — ACETAMINOPHEN 650 MG: 325 TABLET, FILM COATED ORAL at 23:03

## 2018-09-08 RX ADMIN — KETOROLAC TROMETHAMINE 15 MG: 15 INJECTION, SOLUTION INTRAMUSCULAR; INTRAVENOUS at 17:25

## 2018-09-08 NOTE — PROGRESS NOTES
Orthopaedics Patient status post LAMINEC/FACETECT/FORAMIN,LUMBAR [61242] (SPINE LUMBAR POSTERIOR INTERBODY FUSION (PLIF)) LAMINEC/FACETECT/FORAMIN,EACH ADDNL [20235] [place] ARTHRODESIS POSTERIOR/POSTEROLATERAL LUMBAR Lio Mckeon [[id number]] SPIN BONE AUTOGRFT LOCAL [52535] for STENOSIS SPONDYL M48.061 M43.16 Lumbar stenosis 9/5/2018. Currently in ICU r/t dysrhythmia, hypotension, febrile overnight, remains NPO r/t possible ileus. Visit Vitals  /72  Pulse (!) 103  Temp 98.8 °F (37.1 °C)  Resp 23  
 Ht 5' 6\" (1.676 m)  Wt 85.1 kg (187 lb 9 oz)  SpO2 98%  BMI 30.27 kg/m2 CBC w/Diff Lab Results Component Value Date/Time WBC 10.6 09/07/2018 12:43 PM  
 RBC 4.20 (L) 09/07/2018 12:43 PM  
 HCT 29.3 (L) 09/07/2018 12:43 PM  
 MCV 69.8 (L) 09/07/2018 12:43 PM  
 MCH 21.9 (L) 09/07/2018 12:43 PM  
 MCHC 31.4 09/07/2018 12:43 PM  
 RDW 17.0 (H) 09/07/2018 12:43 PM  
 Lab Results Component Value Date/Time MONOS 9 09/07/2018 12:43 PM  
 EOS 0 09/07/2018 12:43 PM  
 BASOS 0 09/07/2018 12:43 PM  
 RDW 17.0 (H) 09/07/2018 12:43 PM  
  
 
 
Physical exam:  Dressing dry and intact. NVI Bilateral Lower Extremities. Palpable DP. Active dorsiflexion and plantar flexion. Calves soft and nontender. Voiding with rivero. Assessment:  Status post LAMINEC/FACETECT/FORAMIN,LUMBAR [26653] (SPINE LUMBAR POSTERIOR INTERBODY FUSION (PLIF)) LAMINEC/FACETECT/FORAMIN,EACH ADDNL [95987] [place] ARTHRODESIS POSTERIOR/POSTEROLATERAL LUMBAR Lio Mckeon [[id number]] SP[employer]GR[employer] LOCAL [67736] for STENOSIS SPONDYL M48.061 M43.16 Lumbar stenosis 9/5/2018,  progressing. PLAN:  Mobilize as tolerated. Monitor temp; wound clean, dry, no erythema, no evidence of infection Continue ICU care per medicine team  
 
Yodit Shelby NP September 8, 2018

## 2018-09-08 NOTE — PROGRESS NOTES
Assumed care of patient resting in bed, report received from off- going nurse, Evie Starks, [place]. Patient oriented x 3, not to time. Continues on cardizem gtt at this time, to be verified. Call bell in reach. Will continue to monitor. 2032: PRN morphine given at this time for increased pain of 7/10 noted to back. Will continue to monitor. 2222: PRN valium given at this time. 0405: received ameya from wife, updates given. Pt also spoke with wife. PRN thorazine given at this time for hiccups along with PRN morphine for complaints of back pain. Bedside and Verbal shift change report given to URIEL Joaquin (oncoming nurse) by Joyce Sexton RN 
 (offgoing nurse). Report included the following information SBAR, ED Summary, OR Summary, Procedure Summary, Intake/Output, MAR, Recent Results and Cardiac Rhythm afib.

## 2018-09-08 NOTE — PROGRESS NOTES
General Internal Medicine/Geriatrics Patient: Messi Winslow MRN: [id number]  CSN: [id number] YOB: 1950  Age: 70 y.o. Sex: male DOA: 9/5/2018 LOS:  LOS: 3 days Subjective: Much more awake Occasional hiccups States passed gas No nausea or vomiting Pain better controlled with Toradol Review of Systems: 
Eyes: negative Ears, Nose, Mouth, Throat, and Face: negative Respiratory: negative for dyspnea on exertion Cardiovascular: negative for chest pain Gastrointestinal: negative for nausea, vomiting and diarrhea Genitourinary:negative for dysuria and hematuria Integument/Breast: negative Hematologic/Lymphatic: negative for bleeding Musculoskeletal:negative for arthralgias Neurological: negative for weakness Behavioral/Psychiatric: negative for behavior problems Endocrine: negative for temperature intolerance Allergic/Immunologic: negative for hay fever Objective:  
 
Physical Exam: 
Patient Vitals for the past 24 hrs: 
 Temp Pulse Resp BP SpO2  
09/08/18 1000 - (!) 103 23 105/72 -  
09/08/18 0900 - 95 17 123/63 -  
09/08/18 0830 - 98 18 91/61 98 % 09/08/18 0800 98.8 °F (37.1 °C) (!) 102 16 118/45 96 % 09/08/18 0730 - (!) 111 15 123/47 96 % 09/08/18 0715 - 95 18 108/68 93 % 09/08/18 0708 - 96 17 110/61 95 % 09/08/18 0610 - (!) 112 - 134/49 -  
09/08/18 0600 - (!) 113 18 134/49 99 % 09/08/18 0500 - 97 17 (!) 135/95 96 % 09/08/18 0400 99.7 °F (37.6 °C) (!) 108 21 151/55 93 % 09/08/18 0300 - 92 15 109/72 99 % 09/08/18 0215 - 89 15 107/57 98 % 09/08/18 0200 - 89 13 98/51 98 % 09/08/18 0100 - 91 16 110/61 97 % 09/08/18 0031 - 95 - 147/68 -  
09/08/18 0015 - 81 19 129/71 100 % 09/08/18 0001 (!) 101.5 °F (38.6 °C) (!) 103 18 122/64 100 % 09/07/18 2300 - 99 14 93/54 96 % 09/07/18 2200 - 87 18 106/61 98 % 09/07/18 2116 - (!) 103 13 125/58 98 % 09/07/18 2101 - (!) 109 13 (!) 109/95 98 % 09/07/18 2045 - (!) 107 13 118/54 97 % 09/07/18 2026 - 86 15 110/48 97 % 09/07/18 2000 98.6 °F (37 °C) (!) 107 24 107/68 95 % 09/07/18 1900 - 91 16 124/65 97 % 09/07/18 1800 - 99 13 107/68 97 % 09/07/18 1700 - (!) 113 16 122/58 98 % 09/07/18 1600 (!) 100.6 °F (38.1 °C) 84 18 100/64 99 % 09/07/18 1545 - (!) 101 17 (!) 137/108 98 % 09/07/18 1530 - 84 11 124/68 99 % 09/07/18 1515 - 79 12 138/60 98 % 09/07/18 1500 - (!) 103 12 (!) 87/55 98 % 09/07/18 1445 - (!) 117 10 116/56 98 % 09/07/18 1332 98.2 °F (36.8 °C) 77 20 132/58 96 % 09/07/18 1250 100.1 °F (37.8 °C) 74 20 132/64 94 % Tmax. 101.5 Afib/SR rate better controlled HEENT: wnl NECK:  No venous distention or adenopathy HEART: RRR, no rubs or gallops LUNGS: Clear to auscultation ABDOMEN:less  distended, decreased BS 
EXT: warm,no edema SKIN: Normal turgor, no breaks NEURO: Awake, alert, non focal 
 
Mehta in place, low U. O.P Intake and Output: 
Current Shift:    
Last three shifts:  09/06 1901 - 09/08 0700 In: 943.9 [P.O.:240; I.V.:703.9] Out: 1225 [Urine:1225] Recent Results (from the past 24 hour(s)) GLUCOSE, POC Collection Time: 09/07/18 11:38 AM  
Result Value Ref Range Glucose (POC) 186 (H) 70 - 110 mg/dL URINALYSIS W/MICROSCOPIC Collection Time: 09/07/18 12:25 PM  
Result Value Ref Range Color YELLOW Appearance CLEAR Specific gravity 1.010 1.005 - 1.030    
 pH (UA) 6.5 5.0 - 8.0 Protein TRACE (A) NEG mg/dL Glucose NEGATIVE  NEG mg/dL Ketone 15 (A) NEG mg/dL Bilirubin NEGATIVE  NEG Blood TRACE (A) NEG Urobilinogen 0.2 0.2 - 1.0 EU/dL Nitrites NEGATIVE  NEG Leukocyte Esterase NEGATIVE  NEG    
 WBC 0 to 2 0 - 4 /hpf  
 RBC 5 to 10 0 - 5 /hpf Epithelial cells NEGATIVE  0 - 5 /lpf Bacteria NEGATIVE  NEG /hpf Mucus NEGATIVE  NEG /lpf  
CBC WITH AUTOMATED DIFF Collection Time: 09/07/18 12:43 PM  
Result Value Ref Range WBC 10.6 4.6 - 13.2 K/uL RBC 4.20 (L) 4.70 - 5.50 M/uL HGB 9.2 (L) 13.0 - 16.0 g/dL HCT 29.3 (L) 36.0 - 48.0 % MCV 69.8 (L) 74.0 - 97.0 FL  
 MCH 21.9 (L) 24.0 - 34.0 PG  
 MCHC 31.4 31.0 - 37.0 g/dL  
 RDW 17.0 (H) 11.6 - 14.5 % PLATELET 969 690 - 916 K/uL MPV 9.6 9.2 - 11.8 FL  
 NEUTROPHILS 80 (H) 40 - 73 % LYMPHOCYTES 11 (L) 21 - 52 % MONOCYTES 9 3 - 10 % EOSINOPHILS 0 0 - 5 % BASOPHILS 0 0 - 2 %  
 ABS. NEUTROPHILS 8.4 (H) 1.8 - 8.0 K/UL  
 ABS. LYMPHOCYTES 1.2 0.9 - 3.6 K/UL  
 ABS. MONOCYTES 0.9 0.05 - 1.2 K/UL  
 ABS. EOSINOPHILS 0.0 0.0 - 0.4 K/UL  
 ABS. BASOPHILS 0.0 0.0 - 0.1 K/UL  
 DF AUTOMATED    
TSH 3RD GENERATION Collection Time: 09/07/18 12:43 PM  
Result Value Ref Range TSH 0.68 0.36 - 3.74 uIU/mL  
IRON PROFILE Collection Time: 09/07/18 12:43 PM  
Result Value Ref Range Iron 16 (L) 50 - 175 ug/dL TIBC 272 250 - 450 ug/dL Iron % saturation 6 % FERRITIN Collection Time: 09/07/18 12:43 PM  
Result Value Ref Range Ferritin 59 8 - 388 NG/ML  
GLUCOSE, POC Collection Time: 09/07/18  5:41 PM  
Result Value Ref Range Glucose (POC) 155 (H) 70 - 110 mg/dL GLUCOSE, POC Collection Time: 09/08/18 12:37 AM  
Result Value Ref Range Glucose (POC) 144 (H) 70 - 110 mg/dL METABOLIC PANEL, COMPREHENSIVE Collection Time: 09/08/18  4:00 AM  
Result Value Ref Range Sodium 136 136 - 145 mmol/L Potassium 4.1 3.5 - 5.5 mmol/L Chloride 100 100 - 108 mmol/L  
 CO2 27 21 - 32 mmol/L Anion gap 9 3.0 - 18 mmol/L Glucose 132 (H) 74 - 99 mg/dL BUN 12 7.0 - 18 MG/DL Creatinine 0.98 0.6 - 1.3 MG/DL  
 BUN/Creatinine ratio 12 12 - 20 GFR est AA >60 >60 ml/min/1.73m2 GFR est non-AA >60 >60 ml/min/1.73m2 Calcium 7.4 (L) 8.5 - 10.1 MG/DL Bilirubin, total 0.9 0.2 - 1.0 MG/DL  
 ALT (SGPT) 24 16 - 61 U/L  
 AST (SGOT) 36 15 - 37 U/L Alk. phosphatase 59 45 - 117 U/L Protein, total 6.2 (L) 6.4 - 8.2 g/dL Albumin 2.6 (L) 3.4 - 5.0 g/dL Globulin 3.6 2.0 - 4.0 g/dL A-G Ratio 0.7 (L) 0.8 - 1.7 Xr Abd (ap And Erect Or Decub) Result Date: 9/7/2018 EXAM: Frontal View of the Abdomen And Pelvis Indication: Abdominal distention, status post lumbar spine surgery Technique: 2 separate frontal views of the abdomen and pelvis. Comparison: None _______________ FINDINGS: Mild diffuse gastric gaseous distention. Mild to moderate diffuse colonic gas from the right lower quadrant to the region of the rectal vault. Mild diffuse small bowel gas is present. No discrete bowel wall thickening. Postsurgical changes of L4-5 fusion. _______________ IMPRESSION: 1. Mild to moderate diffuse colonic gas. No findings of dilatation Xr Chest [facility] Result Date: 9/7/2018 EXAM:Chest X-Ray  History: Cough Technique:  Portable Frontal View Comparison: No prior exams _______________ FINDINGS: Moderate pulmonary hypoinflation degraded examination with mild asymmetric elevation of the right diaphragm. Midline prominent/accentuated cardiac silhouette. Bilateral hilar vascular crowding. Diffuse interstitial prominence with patchy bilateral lower lung/diaphragmatic opacities. No pneumothorax. Intact osseous structures. _______________ IMPRESSION: 1. Pulmonary hypoinflation degraded examination with findings that may represent atelectasis and crowding. Current Facility-Administered Medications Medication Dose Route Frequency  rivaroxaban (XARELTO) tablet 20 mg  20 mg Oral DAILY WITH DINNER  
 bisacodyl (DULCOLAX) suppository 10 mg  10 mg Rectal NOW  metoprolol (LOPRESSOR) injection 5 mg  5 mg IntraVENous Q6H  
 0.9% sodium chloride infusion  100 mL/hr IntraVENous CONTINUOUS  
 pantoprazole (PROTONIX) 40 mg in sodium chloride 0.9% 10 mL injection  40 mg IntraVENous Q24H  
 ketorolac (TORADOL) injection 15 mg  15 mg IntraVENous Q6H  
 acetaminophen (TYLENOL) suppository 650 mg  650 mg Rectal Q6H PRN  
  chlorproMAZINE (THORAZINE) injection 10 mg  10 mg IntraMUSCular Q6H PRN  
 insulin lispro (HUMALOG) injection   SubCUTAneous Q6H  
 morphine injection 2 mg  2 mg IntraVENous Q3H PRN  
 dilTIAZem (CARDIZEM) 100 mg in 0.9% sodium chloride (MBP/ADV) 100 mL infusion  0-15 mg/hr IntraVENous CONTINUOUS  
 lidocaine (PF) (XYLOCAINE) 10 mg/mL (1 %) injection 0.1 mL  0.1 mL SubCUTAneous PRN  
 sodium chloride (NS) flush 5-10 mL  5-10 mL IntraVENous Q8H  
 sodium chloride (NS) flush 5-10 mL  5-10 mL IntraVENous PRN  
 ondansetron (ZOFRAN) injection 4 mg  4 mg IntraVENous Q4H PRN  
 acetaminophen (TYLENOL) tablet 650 mg  650 mg Oral Q4H PRN  
 diazePAM (VALIUM) tablet 5 mg  5 mg Oral Q6H PRN  
 aspirin delayed-release tablet 81 mg  81 mg Oral DAILY  nitroglycerin (NITRODUR) 0.4 mg/hr patch 1 Patch  1 Patch TransDERmal DAILY  ranolazine ER (RANEXA) tablet 500 mg  500 mg Oral BID  magnesium hydroxide (MILK OF MAGNESIA) 400 mg/5 mL oral suspension 30 mL  30 mL Oral DAILY PRN  
 Please clarify the patient's dose, frequency and if metoprolol if succinate or tartrate  1 Each Other DAILY Lab Results Component Value Date/Time Glucose 132 (H) 09/08/2018 04:00 AM  
 Glucose 122 (H) 09/07/2018 03:55 AM  
 Glucose 197 (H) 12/22/2010 05:45 AM  
 Glucose 245 (H) 12/20/2010 05:45 AM  
 Glucose 181 (H) 12/05/2010 06:00 AM  
  
 
Assessment Active Problems: 
  Lumbar stenosis (9/5/2018) Paroxysmal A-fib ([address] (9/7/2018) Microcytic anemia (9/7/2018) DM (diabetes mellitus) ([address] (9/7/2018) HTN (hypertension) (9/7/2018) CAD (coronary artery disease) (9/7/2018) Hiccup (9/7/2018) Ileus ([address] (9/7/2018) Urine retention (9/7/2018) Atelectasis Iron Deficiency Anemia Plan Continue Same meds, ICU Labs K, low iron sats C/W LAYLA- will need GI workup PPI, monitor H&H 
PCXR C/W Atelectasis, IS, mobilize Abd. Films C/W mild Ileus, Hold NarcoticsMobilize, Dulcolax, clear liquids EKG, continue IV cardizem, B.B. Resume Xarelto Increase IV fluids,  
DC Percocet, limit MS Continue Toradol X24 hrs D/W wife and daughter Brian Moore MD 
9/8/2018, 12:16 PM

## 2018-09-08 NOTE — PROGRESS NOTES
0730  Assumed care of pt from [facility], [address] [place] [address]. Awake, diaphoretic, states he is going to die. Monitor reveals bursts of SVT in 150s. Cardizem drip titrated up to 7.5 mgm from 6. Will continue to monitor. 0900  OOB with Physical therapy and nursing, assist of 2. Able to stand briefly with max assist and walker. Resolution of hiccups noted after standing. Spouse at bedside. 1000  Dr. Jonelle Dugan in. Orders noted. 1200  High pitched bowel sounds noted (borborygmi). Dulcolax suppository given. 1400  No BM post suppository but states passing flatus. Abd remains distended but softer, borborygmi present, denies nausea, taking sm amts cl liq po.   
1700  On bedpan, passing flatus. 1900  Bedside and Verbal shift change report given to Keila TREVINO (oncoming nurse) by Soheila Olivares RN (offgoing nurse). Report included the following information SBAR, Kardex, OR Summary, Intake/Output, MAR, Accordion and Recent Results. Remains in atrial fib/flutter with episodes of NSR and SVT.

## 2018-09-08 NOTE — PROGRESS NOTES
Problem: Falls - Risk of 
Goal: *Absence of Falls Document Bakari Klein Fall Risk and appropriate interventions in the flowsheet. Outcome: Progressing Towards Goal 
Fall Risk Interventions: 
Mobility Interventions: Bed/chair exit alarm Medication Interventions: Bed/chair exit alarm Elimination Interventions: Bed/chair exit alarm, Call light in reach, Toileting schedule/hourly rounds History of Falls Interventions: Bed/chair exit alarm, Room close to nurse's station Problem: Pressure Injury - Risk of 
Goal: *Prevention of pressure injury Document Alireza Scale and appropriate interventions in the flowsheet. Outcome: Progressing Towards Goal 
Pressure Injury Interventions: Activity Interventions: Pressure redistribution bed/mattress(bed type) Mobility Interventions: HOB 30 degrees or less, Pressure redistribution bed/mattress (bed type), Turn and reposition approx. every two hours(pillow and wedges) Nutrition Interventions: Document food/fluid/supplement intake, Offer support with meals,snacks and hydration Friction and Shear Interventions: HOB 30 degrees or less, Transferring/repositioning devices

## 2018-09-08 NOTE — PROGRESS NOTES
Problem: Falls - Risk of 
Goal: *Absence of Falls Document Javier Guy Fall Risk and appropriate interventions in the flowsheet. Outcome: Progressing Towards Goal 
Fall Risk Interventions: 
Mobility Interventions: Bed/chair exit alarm Medication Interventions: Bed/chair exit alarm Elimination Interventions: Bed/chair exit alarm, Call light in reach, Toileting schedule/hourly rounds History of Falls Interventions: Bed/chair exit alarm, Room close to nurse's station

## 2018-09-08 NOTE — PROGRESS NOTES
Problem: Mobility Impaired (Adult and Pediatric) Goal: *Acute Goals and Plan of Care (Insert Text) Physical Therapy Goals Initiated 9/5/2018 and to be accomplished within 7 days. 1.  Patient will complete all bed mobility with minimal assistance/contact guard assist in order to prepare for EOB/OOB activity. 2.  Patient will perform sit <> stand with supervision/set-up in order to prepare for OOB/gait activity. 3.  Patient will perform bed to chair transfers with minimal assistance/contact guard assist in order to promote mobility and encourage seated activity to progress towards their prior level of function. 4.  Patient will ambulate 50 feet with supervision/set-up using LRAD in order to prepare for safe negotiation of their environment. Outcome: Progressing Towards Goal 
PHYSICAL THERAPY: Daily TREATMENT Note INPATIENT: Medicare: Hospital Day: 4 Patient: Enriqueta Sr (21 y.o. male)    Date: 9/8/2018 Primary Diagnosis: STENOSIS SPONDYL M48.061 M43.16 Lumbar stenosis Procedure(s) (LRB): 
DECOMPRESSION L1-2 2-5, POSTEROLATERAL SPINE FUSION FLOSPINE L4-5 (N/A), 3 Days Post-Op, Precautions: Spinal 
FWB Chart, physical therapy assessment, plan of care and goals were reviewed. PLOF:independent ASSESSMENT: 
Pt very difficult to arouse due to morphine given this morning. Max A for rolling> sup>sit. Max A initially for sitting on EOB, CCs for pt to stop pushing. Max A x 2 for sit<>stdx 4,  pt able to stand for max of 3 secs, pushing to sit most of the time due to pain. Pt unable to take side steps at this time due to c/o p!. Max a for sit>supine, and max A for scooting to [facility]. Pt request to lie on Left side. Positioned pillows for pressure relief and pillow between LEs. Progression toward goals: 
      Improving appropriately and progressing toward goals Improving slowly and progressing toward goals(X) Not making progress toward goals and plan of care will be adjusted PLAN: 
Patient continues to benefit from skilled intervention to address the above impairments. Continue treatment per established plan of care. EDUCATION:  
Education:  Patient was educated on the following topics: Pt ed on importance and benefits of bed mobility/positioning every hr, and on proper posture; pt verbalized understanding Discharge Recommendations:  Rehab Further Equipment Recommendations for Discharge:  rolling walker Factors which may impact discharge planning: n/a SUBJECTIVE:  
Patient stated I do not want inflammation. I feel electric.  OBJECTIVE DATA SUMMARY:  
Critical Behavior: 
Neurologic State: Eyes open to voice Orientation Level: Oriented X4 Cognition: Follows commands Safety/Judgement: Fall prevention [address] Standing Balance Scale 
0: Pt performs 25% or less of standing activity (Max assist) CN, 100% impaired. 1: Pt supports self with upper extremities but requires therapist assistance. Pt performs 25-50% of effort (Mod assist) CM, 80% to <100% impaired. 1+: Pt supports self with upper extremities but requires therapist assistance. Pt performs >50% effort. (Min assist). CL, 60% to <80% impaired. 2: Pt supports self independently with both upper extremities (walker, crutches, parallel bars). CL, 60% to <80% impaired. 2+: Pt support self independently with 1 upper extremity (cane, crutch, 1 parallel bar). CK, 40% to <60% impaired. 3: Pt stands without upper extremity support for up to 30 seconds. CK, 40% to <60% impaired. 3+: Pt stands without upper extremity support for 30 seconds or greater. CJ, 20% to <40% impaired. 4: Pt independently moves and returns center of gravity 1-2 inches in one plane. CJ, 20% to <40% impaired. 4+: Pt independently moves and returns center of gravity 1-2 inches in multiple planes. CI, 1% to <20% impaired. 5: Pt independently moves and returns center of gravity in all planes greater than 2 inches. CH, 0% impaired. Functional Mobility: 
 
 
Functional Status Indep (I) Mod I Super-vision Min A Mod A Max A Total A Assist x2 Verbal cues Additional time Not tested Comments Rolling []  []  [] []    []    [x]  []  [] [] [] [] Supine to sit []  []  [] []  []  [x]  []  [] [] [] [] Sit to supine []  []  [] []  []  [x]  []  [] [] [] [] Sit to stand []  []  [] []  []  [x]  []  [] [] [] []   
Stand to sit []  []  [] []  []  [x]  []  [] [] [] [] Bed to chair transfers []  []  [] []  []  []  []  [] [] [] [] Balance Good Maddi Medici Poor Unable Not tested Comments Sitting static []  []  [x]  []  [] Sitting dynamic []  []  [x]  []  []   
Standing static []  []  [x]  []  []   
Standing dynamic []  []  [x]  []  [] Neuro Re-Education: 
sitting on EOB Therapeutic Exercises:  
 
 
 
EXERCISE Sets Reps Active Active Assist  
Passive Self ROM Comments Ankle Pumps 1 10  [] [] [x] [] Quad Sets/Glut Sets   [] [] [] [] Hamstring Sets   [] [] [] [] Short Arc Quads   [] [] [] [] Heel Slides   [] [] [] [] Straight Leg Raises   [] [] [] [] Hip Abd/Add   [] [] [] [] Long Arc Quads   [] [] [] [] Seated Marching   [] [] [] []   
Standing Marching   [] [] [] []   
   [] [] [] []   
 
 
Vital Signs Temp: 98.8 °F (37.1 °C) Pulse (Heart Rate): (!) 103 BP: 105/72 Resp Rate: 23    
O2 Sat (%): 98 % Pain:n/a 
Pre treatment pain level:n/a Post treatment pain level:n/a Pain Scale 1: Adult Nonverbal Pain Scale Pain Intensity 1: 0 Pain Location 1: Back Pain Orientation 1: Lower Pain Description 1: Sherrie Emanuel Pain Intervention(s) 1: Medication (see MAR) Activity Tolerance:  
poor due to lethargic from morphin After treatment:  
Patient left in no apparent distress sitting up in chair Patient left in no apparent distress in bed(x) Call bell left within reach(x) Nursing notified(x) Caregiver present Bed alarm activated Miriam Saravia PTA Time Calculation: 32 mins

## 2018-09-09 LAB
ANION GAP SERPL CALC-SCNC: 9 MMOL/L (ref 3–18)
BASOPHILS # BLD: 0 K/UL (ref 0–0.1)
BASOPHILS NFR BLD: 0 % (ref 0–2)
BUN SERPL-MCNC: 12 MG/DL (ref 7–18)
BUN/CREAT SERPL: 15 (ref 12–20)
CALCIUM SERPL-MCNC: 6.8 MG/DL (ref 8.5–10.1)
CHLORIDE SERPL-SCNC: 102 MMOL/L (ref 100–108)
CO2 SERPL-SCNC: 25 MMOL/L (ref 21–32)
CREAT SERPL-MCNC: 0.8 MG/DL (ref 0.6–1.3)
DIFFERENTIAL METHOD BLD: ABNORMAL
EOSINOPHIL # BLD: 0.1 K/UL (ref 0–0.4)
EOSINOPHIL NFR BLD: 2 % (ref 0–5)
ERYTHROCYTE [DISTWIDTH] IN BLOOD BY AUTOMATED COUNT: 16.8 % (ref 11.6–14.5)
GLUCOSE BLD STRIP.AUTO-MCNC: 152 MG/DL (ref 70–110)
GLUCOSE BLD STRIP.AUTO-MCNC: 175 MG/DL (ref 70–110)
GLUCOSE BLD STRIP.AUTO-MCNC: 185 MG/DL (ref 70–110)
GLUCOSE BLD STRIP.AUTO-MCNC: 272 MG/DL (ref 70–110)
GLUCOSE BLD STRIP.AUTO-MCNC: 273 MG/DL (ref 70–110)
GLUCOSE SERPL-MCNC: 160 MG/DL (ref 74–99)
HCT VFR BLD AUTO: 25.4 % (ref 36–48)
HGB BLD-MCNC: 8.2 G/DL (ref 13–16)
LYMPHOCYTES # BLD: 1.1 K/UL (ref 0.9–3.6)
LYMPHOCYTES NFR BLD: 17 % (ref 21–52)
MCH RBC QN AUTO: 21.7 PG (ref 24–34)
MCHC RBC AUTO-ENTMCNC: 32.3 G/DL (ref 31–37)
MCV RBC AUTO: 67.2 FL (ref 74–97)
MONOCYTES # BLD: 0.5 K/UL (ref 0.05–1.2)
MONOCYTES NFR BLD: 8 % (ref 3–10)
NEUTS SEG # BLD: 4.9 K/UL (ref 1.8–8)
NEUTS SEG NFR BLD: 73 % (ref 40–73)
PLATELET # BLD AUTO: 144 K/UL (ref 135–420)
PMV BLD AUTO: 9.7 FL (ref 9.2–11.8)
POTASSIUM SERPL-SCNC: 3.8 MMOL/L (ref 3.5–5.5)
RBC # BLD AUTO: 3.78 M/UL (ref 4.7–5.5)
RBC MORPH BLD: ABNORMAL
SODIUM SERPL-SCNC: 136 MMOL/L (ref 136–145)
WBC # BLD AUTO: 6.6 K/UL (ref 4.6–13.2)

## 2018-09-09 PROCEDURE — 74011250636 HC RX REV CODE- 250/636: Performed by: INTERNAL MEDICINE

## 2018-09-09 PROCEDURE — 80048 BASIC METABOLIC PNL TOTAL CA: CPT | Performed by: INTERNAL MEDICINE

## 2018-09-09 PROCEDURE — 93005 ELECTROCARDIOGRAM TRACING: CPT

## 2018-09-09 PROCEDURE — 77010033678 HC OXYGEN DAILY

## 2018-09-09 PROCEDURE — 74011000258 HC RX REV CODE- 258: Performed by: INTERNAL MEDICINE

## 2018-09-09 PROCEDURE — 77030037878 HC DRSG MEPILEX >48IN BORD MOLN -B

## 2018-09-09 PROCEDURE — 97530 THERAPEUTIC ACTIVITIES: CPT

## 2018-09-09 PROCEDURE — 74011636637 HC RX REV CODE- 636/637: Performed by: ORTHOPAEDIC SURGERY

## 2018-09-09 PROCEDURE — 65610000006 HC RM INTENSIVE CARE

## 2018-09-09 PROCEDURE — 74011636637 HC RX REV CODE- 636/637: Performed by: INTERNAL MEDICINE

## 2018-09-09 PROCEDURE — 77030027138 HC INCENT SPIROMETER -A

## 2018-09-09 PROCEDURE — 74011250637 HC RX REV CODE- 250/637: Performed by: ORTHOPAEDIC SURGERY

## 2018-09-09 PROCEDURE — 74011250637 HC RX REV CODE- 250/637: Performed by: INTERNAL MEDICINE

## 2018-09-09 PROCEDURE — C9113 INJ PANTOPRAZOLE SODIUM, VIA: HCPCS | Performed by: INTERNAL MEDICINE

## 2018-09-09 PROCEDURE — 82962 GLUCOSE BLOOD TEST: CPT

## 2018-09-09 PROCEDURE — 85025 COMPLETE CBC W/AUTO DIFF WBC: CPT | Performed by: INTERNAL MEDICINE

## 2018-09-09 PROCEDURE — 74011000250 HC RX REV CODE- 250: Performed by: INTERNAL MEDICINE

## 2018-09-09 RX ORDER — VANCOMYCIN 2 GRAM/500 ML IN 0.9 % SODIUM CHLORIDE INTRAVENOUS
2000 ONCE
Status: COMPLETED | OUTPATIENT
Start: 2018-09-09 | End: 2018-09-09

## 2018-09-09 RX ORDER — METOPROLOL TARTRATE 50 MG/1
50 TABLET ORAL EVERY 12 HOURS
Status: DISCONTINUED | OUTPATIENT
Start: 2018-09-09 | End: 2018-09-10

## 2018-09-09 RX ORDER — INSULIN LISPRO 100 [IU]/ML
INJECTION, SOLUTION INTRAVENOUS; SUBCUTANEOUS
Status: DISCONTINUED | OUTPATIENT
Start: 2018-09-09 | End: 2018-09-14

## 2018-09-09 RX ADMIN — VANCOMYCIN HYDROCHLORIDE 2000 MG: 10 INJECTION, POWDER, LYOPHILIZED, FOR SOLUTION INTRAVENOUS at 17:42

## 2018-09-09 RX ADMIN — INSULIN LISPRO 2 UNITS: 100 INJECTION, SOLUTION INTRAVENOUS; SUBCUTANEOUS at 08:19

## 2018-09-09 RX ADMIN — MORPHINE SULFATE 2 MG: 2 INJECTION, SOLUTION INTRAMUSCULAR; INTRAVENOUS at 01:08

## 2018-09-09 RX ADMIN — SODIUM CHLORIDE 40 MG: 9 INJECTION, SOLUTION INTRAMUSCULAR; INTRAVENOUS; SUBCUTANEOUS at 13:05

## 2018-09-09 RX ADMIN — PIPERACILLIN SODIUM,TAZOBACTAM SODIUM 3.38 G: 3; .375 INJECTION, POWDER, FOR SOLUTION INTRAVENOUS at 12:00

## 2018-09-09 RX ADMIN — LEVOFLOXACIN 500 MG: 5 INJECTION, SOLUTION INTRAVENOUS at 00:17

## 2018-09-09 RX ADMIN — CHLORPROMAZINE HYDROCHLORIDE 10 MG: 25 INJECTION INTRAMUSCULAR at 20:21

## 2018-09-09 RX ADMIN — CHLORPROMAZINE HYDROCHLORIDE 10 MG: 25 INJECTION INTRAMUSCULAR at 13:05

## 2018-09-09 RX ADMIN — MORPHINE SULFATE 2 MG: 2 INJECTION, SOLUTION INTRAMUSCULAR; INTRAVENOUS at 06:28

## 2018-09-09 RX ADMIN — SODIUM CHLORIDE 100 ML/HR: 900 INJECTION, SOLUTION INTRAVENOUS at 16:30

## 2018-09-09 RX ADMIN — INSULIN LISPRO 6 UNITS: 100 INJECTION, SOLUTION INTRAVENOUS; SUBCUTANEOUS at 12:59

## 2018-09-09 RX ADMIN — MORPHINE SULFATE 2 MG: 2 INJECTION, SOLUTION INTRAMUSCULAR; INTRAVENOUS at 20:21

## 2018-09-09 RX ADMIN — ASPIRIN 81 MG: 81 TABLET, COATED ORAL at 10:53

## 2018-09-09 RX ADMIN — RANOLAZINE 500 MG: 500 TABLET, FILM COATED, EXTENDED RELEASE ORAL at 10:53

## 2018-09-09 RX ADMIN — MORPHINE SULFATE 2 MG: 2 INJECTION, SOLUTION INTRAMUSCULAR; INTRAVENOUS at 13:05

## 2018-09-09 RX ADMIN — INSULIN LISPRO 2 UNITS: 100 INJECTION, SOLUTION INTRAVENOUS; SUBCUTANEOUS at 21:17

## 2018-09-09 RX ADMIN — PIPERACILLIN SODIUM,TAZOBACTAM SODIUM 3.38 G: 3; .375 INJECTION, POWDER, FOR SOLUTION INTRAVENOUS at 01:23

## 2018-09-09 RX ADMIN — ACETAMINOPHEN 650 MG: 325 TABLET, FILM COATED ORAL at 14:46

## 2018-09-09 RX ADMIN — Medication 10 ML: at 05:43

## 2018-09-09 RX ADMIN — INSULIN LISPRO 2 UNITS: 100 INJECTION, SOLUTION INTRAVENOUS; SUBCUTANEOUS at 00:23

## 2018-09-09 RX ADMIN — SODIUM CHLORIDE 15 MG/HR: 900 INJECTION, SOLUTION INTRAVENOUS at 14:46

## 2018-09-09 RX ADMIN — Medication 10 ML: at 13:18

## 2018-09-09 RX ADMIN — CHLORPROMAZINE HYDROCHLORIDE 10 MG: 25 INJECTION INTRAMUSCULAR at 06:28

## 2018-09-09 RX ADMIN — RIVAROXABAN 20 MG: 20 TABLET, FILM COATED ORAL at 16:33

## 2018-09-09 RX ADMIN — SODIUM CHLORIDE 100 ML/HR: 900 INJECTION, SOLUTION INTRAVENOUS at 04:49

## 2018-09-09 RX ADMIN — SODIUM CHLORIDE 15 MG/HR: 900 INJECTION, SOLUTION INTRAVENOUS at 08:00

## 2018-09-09 RX ADMIN — PIPERACILLIN SODIUM,TAZOBACTAM SODIUM 3.38 G: 3; .375 INJECTION, POWDER, FOR SOLUTION INTRAVENOUS at 05:40

## 2018-09-09 RX ADMIN — ACETAMINOPHEN 650 MG: 325 TABLET, FILM COATED ORAL at 21:17

## 2018-09-09 RX ADMIN — Medication 10 ML: at 21:20

## 2018-09-09 RX ADMIN — PIPERACILLIN SODIUM,TAZOBACTAM SODIUM 3.38 G: 3; .375 INJECTION, POWDER, FOR SOLUTION INTRAVENOUS at 18:50

## 2018-09-09 RX ADMIN — RANOLAZINE 500 MG: 500 TABLET, FILM COATED, EXTENDED RELEASE ORAL at 17:54

## 2018-09-09 RX ADMIN — METOPROLOL TARTRATE 50 MG: 50 TABLET ORAL at 11:58

## 2018-09-09 RX ADMIN — INSULIN LISPRO 6 UNITS: 100 INJECTION, SOLUTION INTRAVENOUS; SUBCUTANEOUS at 17:49

## 2018-09-09 RX ADMIN — METOPROLOL TARTRATE 5 MG: 1 INJECTION, SOLUTION INTRAVENOUS at 05:40

## 2018-09-09 RX ADMIN — METOPROLOL TARTRATE 50 MG: 50 TABLET ORAL at 21:17

## 2018-09-09 NOTE — PROGRESS NOTES
Problem: Pressure Injury - Risk of 
Goal: *Prevention of pressure injury Document Alireza Scale and appropriate interventions in the flowsheet. Outcome: Progressing Towards Goal 
Pressure Injury Interventions: 
Sensory Interventions: Check visual cues for pain Activity Interventions: PT/OT evaluation Mobility Interventions: Pressure redistribution bed/mattress (bed type) Nutrition Interventions: Document food/fluid/supplement intake Friction and Shear Interventions: Foam dressings/transparent film/skin sealants

## 2018-09-09 NOTE — PROGRESS NOTES
Orthopaedics Patient without new complaints status post LAMINEC/FACETECT/FORAMIN,LUMBAR [31443] (SPINE LUMBAR POSTERIOR INTERBODY FUSION (PLIF)) LAMINEC/FACETECT/FORAMIN,EACH ADDNL [07128] MD ARTHRODESIS POSTERIOR/POSTEROLATERAL LUMBAR Rhoderick [place] Maite Claw [[id number]] SPIN BONE [employer] LOC[employer] [19662] for STENOSIS SPONDYL M48.061 M43.16 Lumbar stenosis 9/5/2018. Remains  In ICU with afib, on cardiazem gtt. Fevers overnight, blood cultures sent. Ileus, allowing clear liquids. No new orthopaedic complaints. Visit Vitals  /59  Pulse 98  Temp (!) 100.8 °F (38.2 °C)  Resp 20  
 Ht 5' 6\" (1.676 m)  Wt 85.1 kg (187 lb 9 oz)  SpO2 100%  BMI 30.27 kg/m2 CBC w/Diff Lab Results Component Value Date/Time WBC 6.6 09/09/2018 04:15 AM  
 RBC 3.78 (L) 09/09/2018 04:15 AM  
 HCT 25.4 (L) 09/09/2018 04:15 AM  
 MCV 67.2 (L) 09/09/2018 04:15 AM  
 MCH 21.7 (L) 09/09/2018 04:15 AM  
 MCHC 32.3 09/09/2018 04:15 AM  
 RDW 16.8 (H) 09/09/2018 04:15 AM  
 Lab Results Component Value Date/Time MONOS 8 09/09/2018 04:15 AM  
 EOS 2 09/09/2018 04:15 AM  
 BASOS 0 09/09/2018 04:15 AM  
 RDW 16.8 (H) 09/09/2018 04:15 AM  
  
 
 
Physical exam:  Dressing dry. NVI Bilateral Lower Extremities. Palpable DP/PT. Active dorsiflexion, plantar flexion. Calves soft, non tender. Voiding with rivero. Passing flatus. Dressing in place. Clean, Dry, Intact. Assessment:  Status post LAMINEC/FACETECT/FORAMIN,LUMBAR [10942] (SPINE LUMBAR POSTERIOR INTERBODY FUSION (PLIF)) LAMINEC/FACETECT/FORAMIN,EACH ADDNL [02404] MD ARTHRODESIS POSTERIOR/POSTEROLATERAL LUMBAR Rhoderick [place] Maite Claw [[id number]] SPIN BONE [employer] LOC[employer] [[id number]] for STENOSIS SPONDYL M48.061 M43.16 Lumbar stenosis 9/5/2018,  progressing. PLAN:  Mobilize as tolerated. Continue PT Continue to monitor temp and s/s of infection  Continue ICU per medicine team. 
 
Faisal Jaeger, NP 
 September 9, 2018

## 2018-09-09 NOTE — PROGRESS NOTES
2000 nursing assessment complete. Family at bedside. 2102 hiccups. Thorazine 10mg IM given. Morphine 2mg iv given for back pain. 2321 temp 102.8. Dr River Arshad notified. Orders received 0005 phlebotomy at bedside. IS done with patient. Wife and patient instructed on proper use. Level 1500 
0628 thorazine and morphine given for pain and hiccups Bedside shift change report given to Jemal (oncoming nurse) by Deepika Rangel RN 
 (offgoing nurse). Report included the following information SBAR and MAR.

## 2018-09-09 NOTE — PROGRESS NOTES
General Internal Medicine/Geriatrics Patient: Ayla Fontana MRN: [id number]  CSN: [id number] YOB: 1950  Age: 70 y.o. Sex: male DOA: 9/5/2018 LOS:  LOS: 4 days Subjective:  
 
 awake, alert 
+ cough Occasional hiccups States passed gas & stools No nausea or vomiting Pain better controlled with Toradol Review of Systems: 
Eyes: negative Ears, Nose, Mouth, Throat, and Face: negative Respiratory: negative for dyspnea on exertion Cardiovascular: negative for chest pain Gastrointestinal: negative for nausea, vomiting and diarrhea Genitourinary:negative for dysuria and hematuria Integument/Breast: negative Hematologic/Lymphatic: negative for bleeding Musculoskeletal:negative for arthralgias Neurological: negative for weakness Behavioral/Psychiatric: negative for behavior problems Endocrine: negative for temperature intolerance Allergic/Immunologic: negative for hay fever Objective:  
 
Physical Exam: 
Patient Vitals for the past 24 hrs: 
 Temp Pulse Resp BP SpO2  
09/09/18 0800 (!) 100.8 °F (38.2 °C) - - - -  
09/09/18 0700 - 98 20 131/59 -  
09/09/18 0600 - 83 16 109/47 -  
09/09/18 0500 - 96 20 109/55 -  
09/09/18 0400 97.1 °F (36.2 °C) 88 15 127/59 100 % 09/09/18 0300 - (!) 110 16 108/66 100 % 09/09/18 0200 - (!) 119 14 104/65 99 % 09/09/18 0100 - (!) 125 15 (!) 89/70 98 % 09/08/18 2312 (!) 102.8 °F (39.3 °C) - - - -  
09/08/18 2300 - (!) 115 21 126/78 100 % 09/08/18 2100 - (!) 119 22 134/80 -  
09/08/18 2000 - 94 18 (!) 117/39 99 % 09/08/18 1942 99.1 °F (37.3 °C) - - - -  
09/08/18 1900 - (!) 103 16 104/52 98 % 09/08/18 1800 - (!) 107 19 - 98 % 09/08/18 1700 - (!) 135 18 113/68 100 % 09/08/18 1600 (!) 100.6 °F (38.1 °C) (!) 108 20 141/90 99 % 09/08/18 1500 - (!) 104 15 117/55 100 % 09/08/18 1400 - (!) 116 19 105/62 98 % 09/08/18 1300 - (!) 101 17 116/77 97 % 09/08/18 1232 - (!) 106 - 135/58 -  
 09/08/18 1200 (!) 101.5 °F (38.6 °C) (!) 118 17 135/58 -  
09/08/18 1100 - (!) 119 16 101/73 - Tmax. 102.8 P SVT, variable rate HEENT: wnl NECK:  No venous distention or adenopathy HEART: RRR, no rubs or gallops LUNGS: olivia. Rhonchi and coarse rales ABDOMEN:less  distended, + BS 
+ stools on bed sheet EXT: warm,no edema SKIN: Normal turgor, no breaks NEURO: Awake, alert, non focal 
 
Mehta in place,  U. O.P improved Intake and Output: 
Current Shift:  09/09 0701 - 09/09 1900 In: 480 [P.O.:480] Out: 400 [Urine:400] Last three shifts:  09/07 1901 - 09/09 0700 In: 3686.6 [P.O.:340; I.V.:3346.6] Out: 2250 [Urine:2250] Recent Results (from the past 24 hour(s)) GLUCOSE, POC Collection Time: 09/08/18 11:42 AM  
Result Value Ref Range Glucose (POC) 151 (H) 70 - 110 mg/dL GLUCOSE, POC Collection Time: 09/08/18  4:35 PM  
Result Value Ref Range Glucose (POC) 151 (H) 70 - 110 mg/dL CULTURE, BLOOD Collection Time: 09/08/18 11:50 PM  
Result Value Ref Range Special Requests: NO SPECIAL REQUESTS Culture result: NO GROWTH AFTER 4 HOURS    
CULTURE, BLOOD Collection Time: 09/09/18 12:00 AM  
Result Value Ref Range Special Requests: NO SPECIAL REQUESTS Culture result: NO GROWTH AFTER 4 HOURS    
GLUCOSE, POC Collection Time: 09/09/18 12:21 AM  
Result Value Ref Range Glucose (POC) 175 (H) 70 - 110 mg/dL METABOLIC PANEL, BASIC Collection Time: 09/09/18  4:15 AM  
Result Value Ref Range Sodium 136 136 - 145 mmol/L Potassium 3.8 3.5 - 5.5 mmol/L Chloride 102 100 - 108 mmol/L  
 CO2 25 21 - 32 mmol/L Anion gap 9 3.0 - 18 mmol/L Glucose 160 (H) 74 - 99 mg/dL BUN 12 7.0 - 18 MG/DL Creatinine 0.80 0.6 - 1.3 MG/DL  
 BUN/Creatinine ratio 15 12 - 20 GFR est AA >60 >60 ml/min/1.73m2 GFR est non-AA >60 >60 ml/min/1.73m2 Calcium 6.8 (L) 8.5 - 10.1 MG/DL  
CBC WITH AUTOMATED DIFF Collection Time: 09/09/18  4:15 AM  
Result Value Ref Range WBC 6.6 4.6 - 13.2 K/uL  
 RBC 3.78 (L) 4.70 - 5.50 M/uL HGB 8.2 (L) 13.0 - 16.0 g/dL HCT 25.4 (L) 36.0 - 48.0 % MCV 67.2 (L) 74.0 - 97.0 FL  
 MCH 21.7 (L) 24.0 - 34.0 PG  
 MCHC 32.3 31.0 - 37.0 g/dL  
 RDW 16.8 (H) 11.6 - 14.5 % PLATELET 818 950 - 924 K/uL MPV 9.7 9.2 - 11.8 FL  
 NEUTROPHILS 73 40 - 73 % LYMPHOCYTES 17 (L) 21 - 52 % MONOCYTES 8 3 - 10 % EOSINOPHILS 2 0 - 5 % BASOPHILS 0 0 - 2 %  
 ABS. NEUTROPHILS 4.9 1.8 - 8.0 K/UL  
 ABS. LYMPHOCYTES 1.1 0.9 - 3.6 K/UL  
 ABS. MONOCYTES 0.5 0.05 - 1.2 K/UL  
 ABS. EOSINOPHILS 0.1 0.0 - 0.4 K/UL  
 ABS. BASOPHILS 0.0 0.0 - 0.1 K/UL  
 RBC COMMENTS ANISOCYTOSIS 1+ 
    
 RBC COMMENTS POLYCHROMASIA 1+ 
    
 RBC COMMENTS POIKILOCYTOSIS 1+ 
    
 RBC COMMENTS TEARDROP CELLS 1+ 
    
 DF MANUAL    
GLUCOSE, POC Collection Time: 09/09/18  7:58 AM  
Result Value Ref Range Glucose (POC) 152 (H) 70 - 110 mg/dL EKG, 12 LEAD, INITIAL Collection Time: 09/09/18 10:02 AM  
Result Value Ref Range Ventricular Rate 94 BPM  
 Atrial Rate 141 BPM  
 QRS Duration 98 ms Q-T Interval 408 ms QTC Calculation (Bezet) 510 ms Calculated R Axis -13 degrees Calculated T Axis 51 degrees Diagnosis Sinus tachycardia with 2nd degree AV block (Mobitz I) with 2:1 AV conduction Incomplete right bundle branch block Prolonged QT Abnormal ECG No previous ECGs available No results found. Current Facility-Administered Medications Medication Dose Route Frequency  insulin lispro (HUMALOG) injection   SubCUTAneous AC&HS  
 metoprolol tartrate (LOPRESSOR) tablet 50 mg  50 mg Oral Q12H  rivaroxaban (XARELTO) tablet 20 mg  20 mg Oral DAILY WITH DINNER  
 levoFLOXacin (LEVAQUIN) 500 mg in D5W IVPB  500 mg IntraVENous Q24H  piperacillin-tazobactam (ZOSYN) 3.375 g in 0.9% sodium chloride (MBP/ADV) 100 mL MBP  3.375 g IntraVENous Q6H  
 0.9% sodium chloride infusion  100 mL/hr IntraVENous CONTINUOUS  
  pantoprazole (PROTONIX) 40 mg in sodium chloride 0.9% 10 mL injection  40 mg IntraVENous Q24H  
 acetaminophen (TYLENOL) suppository 650 mg  650 mg Rectal Q6H PRN  chlorproMAZINE (THORAZINE) injection 10 mg  10 mg IntraMUSCular Q6H PRN  
 morphine injection 2 mg  2 mg IntraVENous Q3H PRN  
 dilTIAZem (CARDIZEM) 100 mg in 0.9% sodium chloride (MBP/ADV) 100 mL infusion  0-15 mg/hr IntraVENous CONTINUOUS  
 lidocaine (PF) (XYLOCAINE) 10 mg/mL (1 %) injection 0.1 mL  0.1 mL SubCUTAneous PRN  
 sodium chloride (NS) flush 5-10 mL  5-10 mL IntraVENous Q8H  
 sodium chloride (NS) flush 5-10 mL  5-10 mL IntraVENous PRN  
 ondansetron (ZOFRAN) injection 4 mg  4 mg IntraVENous Q4H PRN  
 acetaminophen (TYLENOL) tablet 650 mg  650 mg Oral Q4H PRN  
 diazePAM (VALIUM) tablet 5 mg  5 mg Oral Q6H PRN  
 aspirin delayed-release tablet 81 mg  81 mg Oral DAILY  nitroglycerin (NITRODUR) 0.4 mg/hr patch 1 Patch  1 Patch TransDERmal DAILY  ranolazine ER (RANEXA) tablet 500 mg  500 mg Oral BID  magnesium hydroxide (MILK OF MAGNESIA) 400 mg/5 mL oral suspension 30 mL  30 mL Oral DAILY PRN  
 Please clarify the patient's dose, frequency and if metoprolol if succinate or tartrate  1 Each Other DAILY Lab Results Component Value Date/Time Glucose 160 (H) 09/09/2018 04:15 AM  
 Glucose 132 (H) 09/08/2018 04:00 AM  
 Glucose 122 (H) 09/07/2018 03:55 AM  
 Glucose 197 (H) 12/22/2010 05:45 AM  
 Glucose 245 (H) 12/20/2010 05:45 AM  
  
 
Assessment Active Problems: 
  Lumbar stenosis (9/5/2018) Paroxysmal A-fib ([facility] [address] (9/7/2018) Microcytic anemia (9/7/2018) DM (diabetes mellitus) ([facility] [address] (9/7/2018) HTN (hypertension) (9/7/2018) CAD (coronary artery disease) (9/7/2018) Hiccup (9/7/2018) Ileus ([facility] [address] (9/7/2018) Urine retention (9/7/2018) Atelectasis, ? RLL pneumonia Iron Deficiency Anemia Plan Continue Same meds, ICU 
 Labs BMP/ K OK, low iron sats C/W LAYLA- will need GI workup PPI, monitor H&H 
PCXR C/W Atelectasis, ? RLL pneumonia, likely source of fever Zosyn+ Levaquin, IS, mobilize ( assisted to chair) Ileus resolved, advance diet EKG ? ST, / Fab Rousseau, will ask cardiology to see, continue IV cardizem,P.O  B.B. Resumed Xarelto 
decrease IV fluids,  
DC Percocet, limit MS Continue Toradol today D/W wife Radha Boudreaux MD 
9/9/2018,

## 2018-09-09 NOTE — PROGRESS NOTES
Pharmacy Dosing Services: Vancomycin Indication: PNA Day of therapy: 0 Other Antimicrobials (Include dose, start day & day of therapy): N/A Loading dose (date given): 2000mg Current Maintenance dose: [email or web address] Goal Vancomycin Level: 15-20 
(Trough 15-20 for most infections, 20 for meningitis/osteomyelitis, pre-HD level ~25) Vancomycin Level (if drawn): [email or web address] Significant Cultures: N/A Renal function stable? (unstable defined as SCr increase of 0.5 mg/dL or > 50% increase from baseline, whichever is greater) (Y/N): Y  
 
CAPD, Hemodialysis or Renal Replacement Therapy (Y/N): N Recent Labs  
   18 
 0415  18 
 0400  18 
 1243  18 
 0355 CREA  0.80  0.98   --   0.98 BUN  12  12   --   10 WBC  6.6   --   10.6  15.7* Temp (24hrs), Av.5 °F (38.1 °C), Min:97.1 °F (36.2 °C), Max:102.8 °F (39.3 °C) Creatinine Clearance (Creatinine Clearance (ml/min)): 89 mL/min Regimen assessment: New start Maintenance dose: 1250mg IV Q12H Next scheduled level:  @ 1630 Pharmacy will follow daily and adjust medications as appropriate for renal function and/or serum levels. Thank you, Jocy Duvall, PHARMD

## 2018-09-09 NOTE — PROGRESS NOTES
0730  Assumed care of pt from \[facility]\"". Awakens easily to oriented state. Chilling/rigors present, Cardizem drip at 15 mgm, monitor reveals atrial fib rate in 90s. 56  OOB to chair with max assist, pt able to stand with assist of 2. Dr. Kelvin Ortega at bedside. Diet advanced, hiccups present 1400 Febrile, tylenol given po. Using IS and coughing forcibly. [id number] with PT, stood at bedside with walker x3 then returned to bed. Blanchable Red area noted on buttocks, mepelex applied, pt instructed to stay off of bottom. 1800  Appetite good for food today, glucose elevated and covered with SSInsulin. Had small liquid tan BM. In NSR now 
1900  Bedside and Verbal shift change report given to Keila TREVINO (oncoming nurse) by Tara Abdi RN (offgoing nurse). Report included the following information SBAR, Kardex, Intake/Output, MAR and Accordion.

## 2018-09-09 NOTE — PROGRESS NOTES
Problem: Falls - Risk of 
Goal: *Absence of Falls Document Tamiko Javed Fall Risk and appropriate interventions in the flowsheet. Outcome: Progressing Towards Goal 
Fall Risk Interventions: 
Mobility Interventions: Bed/chair exit alarm Medication Interventions: Evaluate medications/consider consulting pharmacy Elimination Interventions: Patient to call for help with toileting needs History of Falls Interventions: Door open when patient unattended

## 2018-09-09 NOTE — PROGRESS NOTES
Problem: Falls - Risk of 
Goal: *Absence of Falls Document Ayla Shabazz Fall Risk and appropriate interventions in the flowsheet. Outcome: Progressing Towards Goal 
Fall Risk Interventions: 
Mobility Interventions: Bed/chair exit alarm Medication Interventions: Evaluate medications/consider consulting pharmacy Elimination Interventions: Patient to call for help with toileting needs History of Falls Interventions: Door open when patient unattended

## 2018-09-09 NOTE — PROGRESS NOTES
Problem: Mobility Impaired (Adult and Pediatric) Goal: *Acute Goals and Plan of Care (Insert Text) Physical Therapy Goals Initiated 9/5/2018 and to be accomplished within 7 days. 1.  Patient will complete all bed mobility with minimal assistance/contact guard assist in order to prepare for EOB/OOB activity. 2.  Patient will perform sit <> stand with supervision/set-up in order to prepare for OOB/gait activity. 3.  Patient will perform bed to chair transfers with minimal assistance/contact guard assist in order to promote mobility and encourage seated activity to progress towards their prior level of function. 4.  Patient will ambulate 50 feet with supervision/set-up using LRAD in order to prepare for safe negotiation of their environment. Outcome: Progressing Towards Goal 
PHYSICAL THERAPY: Daily TREATMENT Note INPATIENT: Medicare: Hospital Day: 5 Patient: Adelia Scott (12 y.o. male)    Date: 9/9/2018 Primary Diagnosis: STENOSIS SPONDYL M48.061 M43.16 Lumbar stenosis Procedure(s) (LRB): 
DECOMPRESSION L1-2 2-5, POSTEROLATERAL SPINE FUSION FLOSPINE L4-5 (N/A), 4 Days Post-Op, Precautions: Spinal 
FWB Chart, physical therapy assessment, plan of care and goals were reviewed. PLOF:independent ASSESSMENT: 
Pt alert today and less pain. Max A for rolling and sup>sit. Increased time to sit due to pt requesting to lie on side before rising up. Pt able to sit up (I) today; yesterday required mod A initially. Max A for scooting to EOB. Mod A  for sit>std, min A for std>sit. Pt able to improve posture in standing with TCs. Pt attempted to take a step but reports \"I can't right now\". pt was able to stand for nurse to apply dressing to buttocks due to redness. Mod A for LE sit>supine. Positioned left SLing with pillows for support. Progression toward goals: 
      Improving appropriately and progressing toward goals Improving slowly and progressing toward goals(X) Not making progress toward goals and plan of care will be adjusted PLAN: 
Patient continues to benefit from skilled intervention to address the above impairments. Continue treatment per established plan of care. EDUCATION:  
Education:  Patient was educated on the following topics: Pt ed on importance and benefits of bed mobility/positioning every hr, and on proper posture; pt verbalized understanding Discharge Recommendations:  Rehab Further Equipment Recommendations for Discharge:  rolling walker and N/A Factors which may impact discharge planning: n/a SUBJECTIVE:  
Patient stated I dont think I can stand.  OBJECTIVE DATA SUMMARY:  
Critical Behavior: 
Neurologic State: Drowsy, Eyes open to voice Orientation Level: Oriented X4 Cognition: Follows commands Safety/Judgement: Fall prevention [facility] Standing Balance Scale 
0: Pt performs 25% or less of standing activity (Max assist) CN, 100% impaired. 1: Pt supports self with upper extremities but requires therapist assistance. Pt performs 25-50% of effort (Mod assist) CM, 80% to <100% impaired. 1+: Pt supports self with upper extremities but requires therapist assistance. Pt performs >50% effort. (Min assist). CL, 60% to <80% impaired. 2: Pt supports self independently with both upper extremities (walker, crutches, parallel bars). CL, 60% to <80% impaired. 2+: Pt support self independently with 1 upper extremity (cane, crutch, 1 parallel bar). CK, 40% to <60% impaired. 3: Pt stands without upper extremity support for up to 30 seconds. CK, 40% to <60% impaired. 3+: Pt stands without upper extremity support for 30 seconds or greater. CJ, 20% to <40% impaired. 4: Pt independently moves and returns center of gravity 1-2 inches in one plane. CJ, 20% to <40% impaired. 4+: Pt independently moves and returns center of gravity 1-2 inches in multiple planes. CI, 1% to <20% impaired. 5: Pt independently moves and returns center of gravity in all planes greater than 2 inches. CH, 0% impaired. Functional Mobility: 
 
 
Functional Status Indep (I) Mod I Super-vision Min A Mod A Max A Total A Assist x2 Verbal cues Additional time Not tested Comments Rolling []  []  [] []    []    [x]  []  [] [] [] [] Supine to sit []  []  [] []  []  [x]  []  [] [] [] [] Sit to supine []  []  [] []  [x]  []  []  [] [] [] [] Sit to stand []  []  [] []  [x]  []  []  [] [] [] []   
Stand to sit []  []  [] [x]  []  []  []  [] [] [] [] Bed to chair transfers []  []  [] []  []  []  []  [] [] [] [] Balance Good Ludwig Mclaughlin Poor Unable Not tested Comments Sitting static [x]  []  []  []  [] Sitting dynamic []  [x]  []  []  []   
Standing static []  [x]  []  []  []   
Standing dynamic []  []  [x]  []  [] Neuro Re-Education: 
sitting on EOB Therapeutic Exercises:  
 
 
 
EXERCISE Sets Reps Active Active Assist  
Passive Self ROM Comments Ankle Pumps 1 10  [x] [] [] [] Quad Sets/Glut Sets   [] [] [] [] Hamstring Sets   [] [] [] [] Short Arc Quads   [] [] [] [] Heel Slides   [] [] [] [] Straight Leg Raises   [] [] [] [] Hip Abd/Add   [] [] [] [] Long Arc Quads 1 10 [x] [] [] [] Seated Marching   [] [] [] []   
Standing Marching   [] [] [] []   
   [] [] [] []   
 
 
Vital Signs Temp: (!) 102.8 °F (39.3 °C) Pulse (Heart Rate): 85    
BP: 126/57 Resp Rate: 19    
O2 Sat (%): 99 % Pain:5/10 Pre treatment pain level:5/10 Post treatment pain level:5/10 Pain Scale 1: Visual 
Pain Intensity 1: 3 Pain Location 1: Back; Incisional 
  
  
Pain Intervention(s) 1: Medication (see MAR) Activity Tolerance:  
fair-, limited by pain After treatment:  
Patient left in no apparent distress sitting up in chair Patient left in no apparent distress in bed(X) Call bell left within reach(X) Nursing notified(X) Caregiver present Bed alarm activated Swetha Epperson PTA Time Calculation: 30 mins

## 2018-09-09 NOTE — PROGRESS NOTES
Physical Exam  
Constitutional: He appears well-developed and well-nourished. Skin: Skin is warm and dry. No rash noted. There is pallor. Psychiatric: He has a normal mood and affect.

## 2018-09-09 NOTE — CONSULTS
CARDIOLOGY CONSULT    Patient: Donavan Perez  MR #: [id number]      Reason for consult: Atrial dysrhythmia    Assessment/Plan:     Hospital Problems  Never Reviewed          Codes Class Noted POA    Normal cardiac stress test ICD-10-CM: Z13.6  ICD-9-CM: V81.2  9/9/2018 Unknown    Overview Signed 9/9/2018 11:48 AM by Alycia Florez MD     Steve Duke 8/22/2018 normal perfusion scan EF 65%             Paroxysmal A-fib ([facility] 12 lead ECG today demonstrates atrial bigeminy. Telemetry observation during exam notable for episodic tachycardia either atrial tachycardia or atrial fibrillation. The rhythm has no immediate hemodynamic effect. He is on IV Cardizem at 15 and po metoprolol 50 mg q12h. He is just getting his first dose of metoprolol now at 1155. Will see if the po dose of BB improves the situation before adding additional agent. [id number] score 3 on chronic Xarelto ICD-10-CM: I48.0  ICD-9-CM: 427.31  9/7/2018 Unknown        Microcytic anemia ICD-10-CM: D50.9  ICD-9-CM: 280.9  9/7/2018 Unknown        DM (diabetes mellitus) ([facility] ICD-10-CM: E11.9  ICD-9-CM: 250.00  9/7/2018 Unknown        HTN (hypertension) ICD-10-CM: I10  ICD-9-CM: 401.9  9/7/2018 Unknown        CAD (coronary artery disease); cardiac catheterization 09/2011 demonstrated totally occluded RCA and severe distal LAD and CCx disease. Recent nuclear stress test did not show any perfusion abnormalities.   ICD-10-CM: I25.10  ICD-9-CM: 414.00  9/7/2018 Unknown        Hiccup, might consider chlorpromazine 25 to 50 mg q6 to 8h for intractable hiccups if they persist. ICD-10-CM: R06.6  ICD-9-CM: 786.8  9/7/2018 Unknown        Ileus ([facility] ICD-10-CM: K56.7  ICD-9-CM: 560.1  9/7/2018 Unknown        Urine retention ICD-10-CM: R33.9  ICD-9-CM: 788.20  9/7/2018 Unknown        Lumbar stenosis, S/P lumbar PLIF   ICD-10-CM: M48.061  ICD-9-CM: 724.02  9/5/2018 Unknown              History of Present Illness  Donavan Perez is a 70 y.o. man admitted for lumbar spine surgery. Asked to see him today for refractory atrial dysrhythmia. He has a history of PAF and has been on Xarelto for stroke prophylaxis for several years. He also has a history of CAD with cardiac catheterization data from 2011 as above. He did have PCI / stenting of the circumflex coronary artery in 2006. The patient had lumber back fusion earlier in this hospitalization. He has had some post op issues with ileus and persistent hiccups. At present, he denies CP of any type but seems to be possibly mildly confused. He has had intermittent episodes of  SVT which appears to be possibly atrial fibrillation or atrial tachycardia. It seems to well tolerated at least in the short run from a hemodynamic standpoint. He is on IV Cardizem. Metoprolol is ordered but he has not received any thus far today. A 12 lead ECG was done earlier which shows atrial bigeminy. Past Medical History  Past Medical History:   Diagnosis Date    Atrial fibrillation ([facility]     CAD (coronary artery disease)     Diabetes ([facility]     Hypertension     Meniere's disease        Past Surgical History  Social History     Social History    Marital status:      Spouse name: N/A    Number of children: N/A    Years of education: N/A     Occupational History    Not on file.      Social History Main Topics    Smoking status: Never Smoker    Smokeless tobacco: Never Used    Alcohol use No    Drug use: No    Sexual activity: Not on file     Other Topics Concern    Not on file     Social History Narrative         Meds  Current Facility-Administered Medications   Medication Dose Route Frequency    insulin lispro (HUMALOG) injection   SubCUTAneous AC&HS    metoprolol tartrate (LOPRESSOR) tablet 50 mg  50 mg Oral Q12H    rivaroxaban (XARELTO) tablet 20 mg  20 mg Oral DAILY WITH DINNER    levoFLOXacin (LEVAQUIN) 500 mg in D5W IVPB  500 mg IntraVENous Q24H    piperacillin-tazobactam (ZOSYN) 3.375 g in 0.9% sodium chloride (MBP/ADV) 100 mL MBP  3.375 g IntraVENous Q6H    0.9% sodium chloride infusion  100 mL/hr IntraVENous CONTINUOUS    pantoprazole (PROTONIX) 40 mg in sodium chloride 0.9% 10 mL injection  40 mg IntraVENous Q24H    acetaminophen (TYLENOL) suppository 650 mg  650 mg Rectal Q6H PRN    chlorproMAZINE (THORAZINE) injection 10 mg  10 mg IntraMUSCular Q6H PRN    morphine injection 2 mg  2 mg IntraVENous Q3H PRN    dilTIAZem (CARDIZEM) 100 mg in 0.9% sodium chloride (MBP/ADV) 100 mL infusion  0-15 mg/hr IntraVENous CONTINUOUS    lidocaine (PF) (XYLOCAINE) 10 mg/mL (1 %) injection 0.1 mL  0.1 mL SubCUTAneous PRN    sodium chloride (NS) flush 5-10 mL  5-10 mL IntraVENous Q8H    sodium chloride (NS) flush 5-10 mL  5-10 mL IntraVENous PRN    ondansetron (ZOFRAN) injection 4 mg  4 mg IntraVENous Q4H PRN    acetaminophen (TYLENOL) tablet 650 mg  650 mg Oral Q4H PRN    diazePAM (VALIUM) tablet 5 mg  5 mg Oral Q6H PRN    aspirin delayed-release tablet 81 mg  81 mg Oral DAILY    nitroglycerin (NITRODUR) 0.4 mg/hr patch 1 Patch  1 Patch TransDERmal DAILY    ranolazine ER (RANEXA) tablet 500 mg  500 mg Oral BID    magnesium hydroxide (MILK OF MAGNESIA) 400 mg/5 mL oral suspension 30 mL  30 mL Oral DAILY PRN    Please clarify the patient's dose, frequency and if metoprolol if succinate or tartrate  1 Each Other DAILY         Allergies  No Known Allergies    Social History  Social History     Social History    Marital status:      Spouse name: N/A    Number of children: N/A    Years of education: N/A     Occupational History    Not on file. Social History Main Topics    Smoking status: Never Smoker    Smokeless tobacco: Never Used    Alcohol use No    Drug use: No    Sexual activity: Not on file     Other Topics Concern    Not on file     Social History Narrative       Family History     History reviewed. No pertinent family history.       Review of systems    Unobtainable at present  Physical Exam  Visit Vitals    /64    Pulse (!) 114    Temp (!) 100.8 °F (38.2 °C)    Resp 17    Ht 5' 6\" (1.676 m)    Wt 187 lb 9 oz (85.1 kg)    SpO2 100%    BMI 30.27 kg/m2       Enriqueta Sr is who is alert,  and in no significant respiratory distress. He is hiccuping. Head is normocephalic and atraumatic. Trachea appears midline with no noted thyromegaly. Carotids are full without definite bruits. There is no JVD. Mouth shows adequate dentition. Chest is clear to auscultation bilaterally. Cardiac auscultation reveals an iregular rate and rhythm without significant murmur. Abdomen is soft  Extremities are without significant edema. Dorsalis pedis and posterior tibial pulses are  palpable. . Skin is warm and dry. Labs:   Recent Labs      09/09/18   0415  09/07/18   1243  09/07/18   0355   WBC  6.6  10.6  15.7*   HGB  8.2*  9.2*  10.2*   HCT  25.4*  29.3*  33.5*   PLT  144  161  209     Recent Labs      09/09/18   0415  09/08/18   0400  09/07/18   0355   NA  136  136  137   K  3.8  4.1  4.6   CL  102  100  100   CO2  25  27  27   GLU  160*  132*  122*   BUN  12  12  10   CREA  0.80  0.98  0.98   CA  6.8*  7.4*  7.8*   ALB   --   2.6*   --    SGOT   --   36   --    ALT   --   24   --        No results for input(s): TROIQ, CPK, CKMB in the last 72 hours. Last Lipid:  No results found for: CHOL, CHOLX, CHLST, CHOLV, HDL, LDL, LDLC, DLDLP, TGLX, TRIGL, TRIGP, CHHD, CHHDX        We appreciate the opportunity to see Enriqueta Sr with you. We will follow along.     Daniella Gillis MD  9/9/2018

## 2018-09-10 LAB
ATRIAL RATE: 141 BPM
BASOPHILS # BLD: 0 K/UL (ref 0–0.1)
BASOPHILS NFR BLD: 0 % (ref 0–2)
CALCULATED R AXIS, ECG10: -13 DEGREES
CALCULATED T AXIS, ECG11: 51 DEGREES
DIAGNOSIS, 93000: NORMAL
DIFFERENTIAL METHOD BLD: ABNORMAL
EOSINOPHIL # BLD: 0.1 K/UL (ref 0–0.4)
EOSINOPHIL NFR BLD: 2 % (ref 0–5)
ERYTHROCYTE [DISTWIDTH] IN BLOOD BY AUTOMATED COUNT: 16.9 % (ref 11.6–14.5)
GLUCOSE BLD STRIP.AUTO-MCNC: 156 MG/DL (ref 70–110)
GLUCOSE BLD STRIP.AUTO-MCNC: 186 MG/DL (ref 70–110)
GLUCOSE BLD STRIP.AUTO-MCNC: 202 MG/DL (ref 70–110)
GLUCOSE BLD STRIP.AUTO-MCNC: 289 MG/DL (ref 70–110)
HCT VFR BLD AUTO: 23 % (ref 36–48)
HCT VFR BLD AUTO: 26.6 % (ref 36–48)
HGB BLD-MCNC: 7.3 G/DL (ref 13–16)
HGB BLD-MCNC: 8.4 G/DL (ref 13–16)
LYMPHOCYTES # BLD: 0.8 K/UL (ref 0.9–3.6)
LYMPHOCYTES NFR BLD: 22 % (ref 21–52)
MCH RBC QN AUTO: 21.5 PG (ref 24–34)
MCHC RBC AUTO-ENTMCNC: 31.7 G/DL (ref 31–37)
MCV RBC AUTO: 67.6 FL (ref 74–97)
MONOCYTES # BLD: 0.4 K/UL (ref 0.05–1.2)
MONOCYTES NFR BLD: 9 % (ref 3–10)
NEUTS SEG # BLD: 2.6 K/UL (ref 1.8–8)
NEUTS SEG NFR BLD: 67 % (ref 40–73)
PLATELET # BLD AUTO: 135 K/UL (ref 135–420)
PMV BLD AUTO: 9.7 FL (ref 9.2–11.8)
Q-T INTERVAL, ECG07: 408 MS
QRS DURATION, ECG06: 98 MS
QTC CALCULATION (BEZET), ECG08: 510 MS
RBC # BLD AUTO: 3.4 M/UL (ref 4.7–5.5)
VENTRICULAR RATE, ECG03: 94 BPM
WBC # BLD AUTO: 3.9 K/UL (ref 4.6–13.2)

## 2018-09-10 PROCEDURE — 85025 COMPLETE CBC W/AUTO DIFF WBC: CPT | Performed by: INTERNAL MEDICINE

## 2018-09-10 PROCEDURE — 74011250636 HC RX REV CODE- 250/636: Performed by: INTERNAL MEDICINE

## 2018-09-10 PROCEDURE — 74011000250 HC RX REV CODE- 250: Performed by: INTERNAL MEDICINE

## 2018-09-10 PROCEDURE — 65660000000 HC RM CCU STEPDOWN

## 2018-09-10 PROCEDURE — 74011250637 HC RX REV CODE- 250/637: Performed by: ORTHOPAEDIC SURGERY

## 2018-09-10 PROCEDURE — C9113 INJ PANTOPRAZOLE SODIUM, VIA: HCPCS | Performed by: INTERNAL MEDICINE

## 2018-09-10 PROCEDURE — 97530 THERAPEUTIC ACTIVITIES: CPT

## 2018-09-10 PROCEDURE — 74011000258 HC RX REV CODE- 258: Performed by: ORTHOPAEDIC SURGERY

## 2018-09-10 PROCEDURE — 74011250637 HC RX REV CODE- 250/637: Performed by: INTERNAL MEDICINE

## 2018-09-10 PROCEDURE — 74011000258 HC RX REV CODE- 258: Performed by: INTERNAL MEDICINE

## 2018-09-10 PROCEDURE — 74011636637 HC RX REV CODE- 636/637: Performed by: INTERNAL MEDICINE

## 2018-09-10 PROCEDURE — 82962 GLUCOSE BLOOD TEST: CPT

## 2018-09-10 PROCEDURE — 36415 COLL VENOUS BLD VENIPUNCTURE: CPT | Performed by: INTERNAL MEDICINE

## 2018-09-10 PROCEDURE — 85018 HEMOGLOBIN: CPT | Performed by: INTERNAL MEDICINE

## 2018-09-10 PROCEDURE — 77010033678 HC OXYGEN DAILY

## 2018-09-10 PROCEDURE — 74011250636 HC RX REV CODE- 250/636: Performed by: ORTHOPAEDIC SURGERY

## 2018-09-10 RX ORDER — DILTIAZEM HYDROCHLORIDE 60 MG/1
120 TABLET, FILM COATED ORAL
Status: DISCONTINUED | OUTPATIENT
Start: 2018-09-10 | End: 2018-09-10

## 2018-09-10 RX ORDER — DILTIAZEM HYDROCHLORIDE 30 MG/1
90 TABLET, FILM COATED ORAL
Status: DISCONTINUED | OUTPATIENT
Start: 2018-09-10 | End: 2018-09-11

## 2018-09-10 RX ORDER — METOPROLOL TARTRATE 50 MG/1
100 TABLET ORAL EVERY 12 HOURS
Status: DISCONTINUED | OUTPATIENT
Start: 2018-09-10 | End: 2018-09-14 | Stop reason: HOSPADM

## 2018-09-10 RX ORDER — HYDROCODONE BITARTRATE AND ACETAMINOPHEN 5; 325 MG/1; MG/1
1 TABLET ORAL
Status: DISCONTINUED | OUTPATIENT
Start: 2018-09-10 | End: 2018-09-11

## 2018-09-10 RX ADMIN — IRON SUCROSE 200 MG: 20 INJECTION, SOLUTION INTRAVENOUS at 13:47

## 2018-09-10 RX ADMIN — MORPHINE SULFATE 2 MG: 2 INJECTION, SOLUTION INTRAMUSCULAR; INTRAVENOUS at 00:45

## 2018-09-10 RX ADMIN — METOPROLOL TARTRATE 100 MG: 50 TABLET ORAL at 08:56

## 2018-09-10 RX ADMIN — CHLORPROMAZINE HYDROCHLORIDE 10 MG: 25 INJECTION INTRAMUSCULAR at 12:02

## 2018-09-10 RX ADMIN — SODIUM CHLORIDE 15 MG/HR: 900 INJECTION, SOLUTION INTRAVENOUS at 00:09

## 2018-09-10 RX ADMIN — HYDROCODONE BITARTRATE AND ACETAMINOPHEN 1 TABLET: 5; 325 TABLET ORAL at 16:07

## 2018-09-10 RX ADMIN — LEVOFLOXACIN 500 MG: 5 INJECTION, SOLUTION INTRAVENOUS at 00:45

## 2018-09-10 RX ADMIN — ACETAMINOPHEN 650 MG: 325 TABLET, FILM COATED ORAL at 00:45

## 2018-09-10 RX ADMIN — METOPROLOL TARTRATE 100 MG: 50 TABLET ORAL at 21:58

## 2018-09-10 RX ADMIN — Medication 10 ML: at 16:12

## 2018-09-10 RX ADMIN — PIPERACILLIN SODIUM,TAZOBACTAM SODIUM 3.38 G: 3; .375 INJECTION, POWDER, FOR SOLUTION INTRAVENOUS at 16:07

## 2018-09-10 RX ADMIN — INSULIN LISPRO 2 UNITS: 100 INJECTION, SOLUTION INTRAVENOUS; SUBCUTANEOUS at 13:46

## 2018-09-10 RX ADMIN — DILTIAZEM HYDROCHLORIDE 90 MG: 60 TABLET, FILM COATED ORAL at 08:56

## 2018-09-10 RX ADMIN — DILTIAZEM HYDROCHLORIDE 90 MG: 60 TABLET, FILM COATED ORAL at 16:07

## 2018-09-10 RX ADMIN — PIPERACILLIN SODIUM,TAZOBACTAM SODIUM 3.38 G: 3; .375 INJECTION, POWDER, FOR SOLUTION INTRAVENOUS at 23:48

## 2018-09-10 RX ADMIN — SODIUM CHLORIDE 40 MG: 9 INJECTION, SOLUTION INTRAMUSCULAR; INTRAVENOUS; SUBCUTANEOUS at 12:15

## 2018-09-10 RX ADMIN — PIPERACILLIN SODIUM,TAZOBACTAM SODIUM 3.38 G: 3; .375 INJECTION, POWDER, FOR SOLUTION INTRAVENOUS at 12:06

## 2018-09-10 RX ADMIN — INSULIN LISPRO 4 UNITS: 100 INJECTION, SOLUTION INTRAVENOUS; SUBCUTANEOUS at 21:58

## 2018-09-10 RX ADMIN — SODIUM CHLORIDE 1250 MG: 900 INJECTION, SOLUTION INTRAVENOUS at 17:38

## 2018-09-10 RX ADMIN — ASPIRIN 81 MG: 81 TABLET, COATED ORAL at 08:56

## 2018-09-10 RX ADMIN — PIPERACILLIN SODIUM,TAZOBACTAM SODIUM 3.38 G: 3; .375 INJECTION, POWDER, FOR SOLUTION INTRAVENOUS at 00:07

## 2018-09-10 RX ADMIN — CHLORPROMAZINE HYDROCHLORIDE 10 MG: 25 INJECTION INTRAMUSCULAR at 04:10

## 2018-09-10 RX ADMIN — DILTIAZEM HYDROCHLORIDE 90 MG: 60 TABLET, FILM COATED ORAL at 12:03

## 2018-09-10 RX ADMIN — RIVAROXABAN 20 MG: 20 TABLET, FILM COATED ORAL at 17:37

## 2018-09-10 RX ADMIN — INSULIN LISPRO 2 UNITS: 100 INJECTION, SOLUTION INTRAVENOUS; SUBCUTANEOUS at 08:57

## 2018-09-10 RX ADMIN — CHLORPROMAZINE HYDROCHLORIDE 10 MG: 25 INJECTION INTRAMUSCULAR at 23:39

## 2018-09-10 RX ADMIN — Medication 10 ML: at 21:58

## 2018-09-10 RX ADMIN — LEVOFLOXACIN 500 MG: 5 INJECTION, SOLUTION INTRAVENOUS at 23:42

## 2018-09-10 RX ADMIN — SODIUM CHLORIDE 1250 MG: 900 INJECTION, SOLUTION INTRAVENOUS at 06:24

## 2018-09-10 RX ADMIN — CHLORPROMAZINE HYDROCHLORIDE 10 MG: 25 INJECTION INTRAMUSCULAR at 18:16

## 2018-09-10 RX ADMIN — MORPHINE SULFATE 2 MG: 2 INJECTION, SOLUTION INTRAMUSCULAR; INTRAVENOUS at 12:02

## 2018-09-10 RX ADMIN — SODIUM CHLORIDE 15 MG/HR: 900 INJECTION, SOLUTION INTRAVENOUS at 03:42

## 2018-09-10 RX ADMIN — RANOLAZINE 500 MG: 500 TABLET, FILM COATED, EXTENDED RELEASE ORAL at 08:56

## 2018-09-10 RX ADMIN — Medication 10 ML: at 08:57

## 2018-09-10 RX ADMIN — INSULIN LISPRO 6 UNITS: 100 INJECTION, SOLUTION INTRAVENOUS; SUBCUTANEOUS at 17:38

## 2018-09-10 RX ADMIN — PIPERACILLIN SODIUM,TAZOBACTAM SODIUM 3.38 G: 3; .375 INJECTION, POWDER, FOR SOLUTION INTRAVENOUS at 05:54

## 2018-09-10 RX ADMIN — MORPHINE SULFATE 2 MG: 2 INJECTION, SOLUTION INTRAMUSCULAR; INTRAVENOUS at 04:09

## 2018-09-10 RX ADMIN — RANOLAZINE 500 MG: 500 TABLET, FILM COATED, EXTENDED RELEASE ORAL at 17:37

## 2018-09-10 RX ADMIN — DIAZEPAM 5 MG: 5 TABLET ORAL at 23:46

## 2018-09-10 NOTE — PROGRESS NOTES
attempted to complete a  follow up visit with patient this morning but found patient resting peacefully at this time. No family were seen at time of this visit. Joesph Chávez Chaplains will continue to follow and will provide pastoral care on an as needed/requested basis Reiseñor 3 Board Certified [address] [place] [address] Care  
[phone]

## 2018-09-10 NOTE — PROGRESS NOTES
Problem: Falls - Risk of 
Goal: *Absence of Falls Document Rip Baird Fall Risk and appropriate interventions in the flowsheet. Outcome: Progressing Towards Goal 
Fall Risk Interventions: 
Mobility Interventions: Communicate number of staff needed for ambulation/transfer Mentation Interventions: Door open when patient unattended Medication Interventions: Evaluate medications/consider consulting pharmacy Elimination Interventions: Bed/chair exit alarm, Call light in reach History of Falls Interventions: Door open when patient unattended

## 2018-09-10 NOTE — PROGRESS NOTES
Problem: Mobility Impaired (Adult and Pediatric) Goal: *Acute Goals and Plan of Care (Insert Text) Physical Therapy Goals Initiated 9/5/2018 and to be accomplished within 7 days. 1.  Patient will complete all bed mobility with minimal assistance/contact guard assist in order to prepare for EOB/OOB activity. 2.  Patient will perform sit <> stand with supervision/set-up in order to prepare for OOB/gait activity. 3.  Patient will perform bed to chair transfers with minimal assistance/contact guard assist in order to promote mobility and encourage seated activity to progress towards their prior level of function. 4.  Patient will ambulate 50 feet with supervision/set-up using LRAD in order to prepare for safe negotiation of their environment. Outcome: Progressing Towards Goal 
 
PHYSICAL THERAPY: Daily TREATMENT Note INPATIENT: Medicare: Hospital Day: 6 Patient: Sandhya Pan (46 y.o. male)    Date: 9/10/2018 Primary Diagnosis: STENOSIS SPONDYL M48.061 M43.16 Lumbar stenosis Procedure(s) (LRB): 
DECOMPRESSION L1-2 2-5, POSTEROLATERAL SPINE FUSION FLOSPINE L4-5 (N/A), 5 Days Post-Op, Precautions: Spinal 
 
Chart, physical therapy assessment, plan of care and goals were reviewed. PLOF:Independent ASSESSMENT: 
Patient supine in bed, agreeable to participation with PT. MAX x 2 for supine <> sit using log roll technique. Sit <> stand with MAX x 2. Tolerates standing for 2 - 3 minutes before need to sit back down. Returned to bed with MAX x 2. Patient positioned for comfort and left supine with all needs within reach. Instructed in deep breathing throughout session. Patient begins to have hiccups with mobility. URIEL Wilkerson made aware of session. Patient has c/o back pain with mobility, no rating provided. Progression toward goals: 
      Improving slowly and progressing toward goals PLAN: 
Patient continues to benefit from skilled intervention to address the above impairments. Continue treatment per established plan of care. EDUCATION:  
Education:  Patient was educated on the following topics: strategy for bed mobility, and transfers, deep breathing with mobility, and safety. Verbalizes understanding. Barriers to Learning/Limitations: None Compensate with: visual, verbal, tactile, kinesthetic cues/model Discharge Recommendations:  Ottoniel Purcell Further Equipment Recommendations for Discharge:  rolling walker Factors which may impact discharge planning: Medical condition SUBJECTIVE:  
Patient stated I was up in the chair today.  OBJECTIVE DATA SUMMARY:  
Critical Behavior: 
Neurologic State: Alert Orientation Level: Oriented X4 Cognition: Follows commands Safety/Judgement: Fall prevention G CODE:Mobility [id number] Current  CL= 60-79%   Goal  CL= 60-79%. The severity rating is based on the Other [facility] Balance Scale 2/5 209 [address] Standing Balance Scale 2/5 
0: Pt performs 25% or less of standing activity (Max assist) CN, 100% impaired. 1: Pt supports self with upper extremities but requires therapist assistance. Pt performs 25-50% of effort (Mod assist) CM, 80% to <100% impaired. 1+: Pt supports self with upper extremities but requires therapist assistance. Pt performs >50% effort. (Min assist). CL, 60% to <80% impaired. 2: Pt supports self independently with both upper extremities (walker, crutches, parallel bars). CL, 60% to <80% impaired. 2+: Pt support self independently with 1 upper extremity (cane, crutch, 1 parallel bar). CK, 40% to <60% impaired. 3: Pt stands without upper extremity support for up to 30 seconds. CK, 40% to <60% impaired. 3+: Pt stands without upper extremity support for 30 seconds or greater. CJ, 20% to <40% impaired. 4: Pt independently moves and returns center of gravity 1-2 inches in one plane. CJ, 20% to <40% impaired. 4+: Pt independently moves and returns center of gravity 1-2 inches in multiple planes. CI, 1% to <20% impaired. 5: Pt independently moves and returns center of gravity in all planes greater than 2 inches. CH, 0% impaired. Functional Mobility: 
 
 
Functional Status Indep (I) Mod I Super-vision Min A Mod A Max A Total A Assist x2 Verbal cues Additional time Not tested Comments Rolling []  []  [] []    []    []  []  [] [] [] [] Supine to sit []  []  [] []  []  [x]  []  [x] [] [] [] Sit to supine []  []  [] []  []  [x]  []  [] [] [] [] Sit to stand []  []  [] []  []  [x]  []  [x] [] [] []   
Stand to sit []  []  [] []  []  [x]  []  [] [] [] [] Bed to chair transfers []  []  [] []  []  []  []  [] [] [] [] Balance Good Cleotis Lown Poor Unable Not tested Comments Sitting static []  [x]  []  []  [] Sitting dynamic []  [x]  []  []  []   
Standing static []  [x]  []  []  []   
Standing dynamic []  [x]  []  []  []   
 
Vital Signs Temp: 98.3 °F (36.8 °C) Pulse (Heart Rate): 82    
BP: 134/61 Resp Rate: 22    
O2 Sat (%): 95 % Pain:  Back pain with mobility Activity Tolerance:  
Fair After treatment:  
Patient left in no apparent distress in bed Call bell left within reach Nursing notified Sandra Mena Time Calculation: 34 mins

## 2018-09-10 NOTE — PROGRESS NOTES
Problem: Falls - Risk of 
Goal: *Absence of Falls Document Lacie Carter Fall Risk and appropriate interventions in the flowsheet. Outcome: Progressing Towards Goal 
Fall Risk Interventions: 
Mobility Interventions: Communicate number of staff needed for ambulation/transfer Mentation Interventions: Door open when patient unattended, Bed/chair exit alarm Medication Interventions: Evaluate medications/consider consulting pharmacy Elimination Interventions: Bed/chair exit alarm, Call light in reach History of Falls Interventions: Door open when patient unattended Problem: Pressure Injury - Risk of 
Goal: *Prevention of pressure injury Document Alireza Scale and appropriate interventions in the flowsheet. Outcome: Progressing Towards Goal 
Pressure Injury Interventions: 
Sensory Interventions: Check visual cues for pain Moisture Interventions: Absorbent underpads Activity Interventions: Increase time out of bed Mobility Interventions: PT/OT evaluation Nutrition Interventions: Document food/fluid/supplement intake Friction and Shear Interventions: Apply protective barrier, creams and emollients

## 2018-09-10 NOTE — PROGRESS NOTES
Progress Note Assessment: 1. Status post  LAMINEC/FACETECT/FORAMIN,LUMBAR [30111] (SPINE LUMBAR POSTERIOR INTERBODY FUSION (PLIF)) LAMINEC/FACETECT/FORAMIN,EACH ADDNL [79277] MN ARTHRODESIS POSTERIOR/POSTEROLATERAL LUMBAR Roselia Daunt [place] [20294] SPIN BONE AUT[employer]GR[employer] LOCAL [71023] for STENOSIS SPONDYL M48.061 M43.16 Lumbar stenosis 9/5/2018,   Progressing. PLAN:   
1. Mobilize. Continue P.T. 
2. Brace 3. DVT prophylaxis per prophylaxis pending repeat H/H 
4. Discharge Planning. HPI: Dariela Claros is a 70 y.o. male patient without new complaints status post fusion for STENOSIS SPONDYL M48.061 M43.16 Lumbar stenosis 9/5/2018. No new orthopaedic changes. Blood pressure 144/60, pulse 96, temperature 98.1 °F (36.7 °C), resp. rate 27, height 5' 6\" (1.676 m), weight 94.3 kg (208 lb), SpO2 97 %. CBC w/Diff Lab Results Component Value Date/Time WBC 3.9 (L) 09/10/2018 04:30 AM  
 RBC 3.40 (L) 09/10/2018 04:30 AM  
 HCT 23.0 (L) 09/10/2018 04:30 AM  
  
 
 
Physical Assessment: 
General: in no apparent distress Extremities:  Neurovascular intact Dressing:  Dry DVT Exam:   No exam evidence to suggest DVT. Compartments soft and NT.  
  
 
 
    
 
- Adelina Lewis PA-C 
9/10/2018 Office [phone] Cell [phone]

## 2018-09-10 NOTE — PROGRESS NOTES
1003: PT treatment session attempted. Patient on Cardizem drip, HR in 110s at rest. Will f/u with patient to determine if appropriate for afternoon session. Lan Ramírez PT, DPT Office extension: [phone] Pager #: [phone]

## 2018-09-10 NOTE — PROGRESS NOTES
General Internal Medicine/Geriatrics Patient: Christy Noble MRN: [id number]  CSN: [id number] YOB: 1950  Age: 70 y.o. Sex: male DOA: 9/5/2018 LOS:  LOS: 5 days Subjective:  
 
Pain better Awake, alert Tolerating diet 
+ BM Review of Systems: 
Eyes: negative Ears, Nose, Mouth, Throat, and Face: negative Respiratory: negative for dyspnea on exertion Cardiovascular: negative for chest pain Gastrointestinal: negative for nausea, vomiting and diarrhea Genitourinary:negative for dysuria and hematuria Integument/Breast: negative Hematologic/Lymphatic: negative for bleeding Musculoskeletal:negative for arthralgias Neurological: negative for weakness Behavioral/Psychiatric: negative for behavior problems Endocrine: negative for temperature intolerance Allergic/Immunologic: negative for hay fever Objective:  
 
Physical Exam: 
Patient Vitals for the past 24 hrs: 
 Temp Pulse Resp BP SpO2  
09/10/18 0600 - 86 16 113/56 95 % 09/10/18 0500 - 84 21 120/54 97 % 09/10/18 0403 - 79 17 103/55 97 % 09/10/18 0400 98.1 °F (36.7 °C) - - - -  
09/10/18 0300 - 78 19 121/68 95 % 09/10/18 0201 - 79 21 121/82 93 % 09/10/18 0100 - 73 20 126/57 95 % 09/10/18 0008 99.2 °F (37.3 °C) - - - -  
09/10/18 0001 - 72 18 109/55 94 % 09/09/18 2335 100.3 °F (37.9 °C) - - - -  
09/09/18 2300 - 74 18 110/55 93 % 09/09/18 2200 - 81 17 113/60 94 % 09/09/18 2117 (!) 100.8 °F (38.2 °C) - - - -  
09/09/18 2100 - 94 22 141/60 98 % 09/09/18 2000 - 83 18 111/80 99 % 09/09/18 1935 98.3 °F (36.8 °C) - - - -  
09/09/18 1900 - 80 18 129/66 93 % 09/09/18 1800 - 77 18 122/60 98 % 09/09/18 1700 - 78 18 118/60 97 % 09/09/18 1600 100 °F (37.8 °C) 88 22 97/56 99 % 09/09/18 1500 - 85 19 126/57 99 % 09/09/18 1400 - 84 18 120/56 99 % 09/09/18 1300 - 83 22 105/71 99 % 09/09/18 1200 (!) 102.8 °F (39.3 °C) (!) 102 17 130/60 -  
09/09/18 1100 - (!) 114 17 145/64 -  
 09/09/18 1000 - 96 20 131/64 100 % Tmax. 102.8, trending down Intermittent P SVT,now better controlled HEENT: wnl NECK:  No venous distention or adenopathy HEART: RRR, no rubs or gallops LUNGS: olivia. Rhonchi and coarse rales ABDOMEN:less  distended, + BS 
+ stools EXT: warm,no edema SKIN: Normal turgor, no breaks NEURO: Awake, alert, non focal 
 
Mehta in place,  U. O.P improved Intake and Output: 
Current Shift:    
Last three shifts:  09/08 1901 - 09/10 0700 In: 7192.9 [P.O.:1880; I.V.:5312.9] Out: 3950 [DNAPP:5805] Recent Results (from the past 24 hour(s)) EKG, 12 LEAD, INITIAL Collection Time: 09/09/18 10:02 AM  
Result Value Ref Range Ventricular Rate 94 BPM  
 Atrial Rate 141 BPM  
 QRS Duration 98 ms Q-T Interval 408 ms QTC Calculation (Bezet) 510 ms Calculated R Axis -13 degrees Calculated T Axis 51 degrees Diagnosis Sinus tachycardia with 2nd degree AV block (Mobitz I) with 2:1 AV conduction Incomplete right bundle branch block Prolonged QT Abnormal ECG No previous ECGs available GLUCOSE, POC Collection Time: 09/09/18 12:58 PM  
Result Value Ref Range Glucose (POC) 272 (H) 70 - 110 mg/dL GLUCOSE, POC Collection Time: 09/09/18  5:48 PM  
Result Value Ref Range Glucose (POC) 273 (H) 70 - 110 mg/dL GLUCOSE, POC Collection Time: 09/09/18  9:12 PM  
Result Value Ref Range Glucose (POC) 185 (H) 70 - 110 mg/dL CBC WITH AUTOMATED DIFF Collection Time: 09/10/18  4:30 AM  
Result Value Ref Range WBC 3.9 (L) 4.6 - 13.2 K/uL  
 RBC 3.40 (L) 4.70 - 5.50 M/uL HGB 7.3 (L) 13.0 - 16.0 g/dL HCT 23.0 (L) 36.0 - 48.0 % MCV 67.6 (L) 74.0 - 97.0 FL  
 MCH 21.5 (L) 24.0 - 34.0 PG  
 MCHC 31.7 31.0 - 37.0 g/dL  
 RDW 16.9 (H) 11.6 - 14.5 % PLATELET 140 800 - 488 K/uL MPV 9.7 9.2 - 11.8 FL  
 NEUTROPHILS 67 40 - 73 % LYMPHOCYTES 22 21 - 52 % MONOCYTES 9 3 - 10 % EOSINOPHILS 2 0 - 5 % BASOPHILS 0 0 - 2 % ABS. NEUTROPHILS 2.6 1.8 - 8.0 K/UL  
 ABS. LYMPHOCYTES 0.8 (L) 0.9 - 3.6 K/UL  
 ABS. MONOCYTES 0.4 0.05 - 1.2 K/UL  
 ABS. EOSINOPHILS 0.1 0.0 - 0.4 K/UL  
 ABS. BASOPHILS 0.0 0.0 - 0.1 K/UL  
 DF AUTOMATED    
GLUCOSE, POC Collection Time: 09/10/18  8:26 AM  
Result Value Ref Range Glucose (POC) 156 (H) 70 - 110 mg/dL No results found. Current Facility-Administered Medications Medication Dose Route Frequency  iron sucrose (VENOFER) 200 mg in 0.9% sodium chloride 100 mL IVPB  200 mg IntraVENous Q24H  
 metoprolol tartrate (LOPRESSOR) tablet 100 mg  100 mg Oral Q12H  
 dilTIAZem (CARDIZEM) IR tablet 90 mg  90 mg Oral TIDAC  insulin lispro (HUMALOG) injection   SubCUTAneous AC&HS  vancomycin (VANCOCIN) 1,250 mg in 0.9% sodium chloride 250 mL IVPB  1,250 mg IntraVENous Q12H  
 [START ON 9/11/2018] VANCOMYCIN INFORMATION NOTE   Other ONCE  
 VANCOMYCIN INFORMATION NOTE 1 Each  1 Each Other Rx Dosing/Monitoring  rivaroxaban (XARELTO) tablet 20 mg  20 mg Oral DAILY WITH DINNER  
 levoFLOXacin (LEVAQUIN) 500 mg in D5W IVPB  500 mg IntraVENous Q24H  piperacillin-tazobactam (ZOSYN) 3.375 g in 0.9% sodium chloride (MBP/ADV) 100 mL MBP  3.375 g IntraVENous Q6H  
 pantoprazole (PROTONIX) 40 mg in sodium chloride 0.9% 10 mL injection  40 mg IntraVENous Q24H  chlorproMAZINE (THORAZINE) injection 10 mg  10 mg IntraMUSCular Q6H PRN  
 morphine injection 2 mg  2 mg IntraVENous Q3H PRN  
 dilTIAZem (CARDIZEM) 100 mg in 0.9% sodium chloride (MBP/ADV) 100 mL infusion  0-15 mg/hr IntraVENous CONTINUOUS  
 lidocaine (PF) (XYLOCAINE) 10 mg/mL (1 %) injection 0.1 mL  0.1 mL SubCUTAneous PRN  
 sodium chloride (NS) flush 5-10 mL  5-10 mL IntraVENous Q8H  
 sodium chloride (NS) flush 5-10 mL  5-10 mL IntraVENous PRN  
 ondansetron (ZOFRAN) injection 4 mg  4 mg IntraVENous Q4H PRN  
 acetaminophen (TYLENOL) tablet 650 mg  650 mg Oral Q4H PRN  
  diazePAM (VALIUM) tablet 5 mg  5 mg Oral Q6H PRN  
 aspirin delayed-release tablet 81 mg  81 mg Oral DAILY  nitroglycerin (NITRODUR) 0.4 mg/hr patch 1 Patch  1 Patch TransDERmal DAILY  ranolazine ER (RANEXA) tablet 500 mg  500 mg Oral BID  magnesium hydroxide (MILK OF MAGNESIA) 400 mg/5 mL oral suspension 30 mL  30 mL Oral DAILY PRN  
 Please clarify the patient's dose, frequency and if metoprolol if succinate or tartrate  1 Each Other DAILY Lab Results Component Value Date/Time Glucose 160 (H) 09/09/2018 04:15 AM  
 Glucose 132 (H) 09/08/2018 04:00 AM  
 Glucose 122 (H) 09/07/2018 03:55 AM  
 Glucose 197 (H) 12/22/2010 05:45 AM  
 Glucose 245 (H) 12/20/2010 05:45 AM  
  
 
Assessment Active Problems: 
  Lumbar stenosis (9/5/2018) Paroxysmal A-fib ([address] (9/7/2018) Microcytic anemia (9/7/2018) DM (diabetes mellitus) ([address] (9/7/2018) HTN (hypertension) (9/7/2018) CAD (coronary artery disease) (9/7/2018) Hiccup (9/7/2018) Ileus ([address] (9/7/2018) Urine retention (9/7/2018) Normal cardiac stress test (9/9/2018) Overview: Hector Roman 8/22/2018 normal perfusion scan EF 65% Atelectasis, ? RLL pneumonia Iron Deficiency Anemia Plan Stool for occult blood, PPI, IV Iron Repeat H&H at 2 pm, transfuse if < 7 or if stool occult +ve May have to [address] [place] [address] Will need GI workup due to iron deficiency anemia DC IV fluids and IV Cardizem P.O Cardizem and increase B.B 
DC Mehta, mobilize Vancomycin added d/t persistent fever, now down. CXR , atelectasis + Rt. ? LL pneumonia To Floor later today if all stable Samira Conklin MD 
9/10/2018,

## 2018-09-10 NOTE — PROGRESS NOTES
Physical Exam  
 
1758 - TRANSFER - IN REPORT: 
 
Verbal report received from Nafisa Finnegan RN(name) on  Corporation  being received from ICU(unit) for routine progression of care Report consisted of patients Situation, Background, Assessment and  
Recommendations(SBAR). Information from the following report(s) SBAR, Kardex, Intake/Output and MAR was reviewed with the receiving nurse. Opportunity for questions and clarification was provided. 1850 - pt received on unit w/RN. Pt c/o inability to breathe. O2 level at 95% on RA. NC applied w/2L O2 & O2 levels up to 99%. Pt wife at bedside. 1905 - Bedside and Verbal shift change report given to Jemal Posada RN (oncoming nurse) by Sascha Fregoso RN (offgoing nurse). Report included the following information SBAR, Kardex, Intake/Output and MAR.

## 2018-09-10 NOTE — PROGRESS NOTES
[facility] - Progress Note Admit Date: 9/5/2018 Patient seen and examined independently. Still with hiccups. Sinus rhythm now. Tolerating BB. Denies CP. Agree with assessment and plan as noted below Keaton Deshpande MD 
Assessment:  
 
Hospital Problems  Never Reviewed Codes Class Noted POA Normal cardiac stress test ICD-10-CM: Z13.6 ICD-9-CM: V81.2  9/9/2018 Unknown Overview Signed 9/9/2018 11:48 AM by Alycia Florez MD  
  Steve Duke 8/22/2018 normal perfusion scan EF 65% Paroxysmal A-fib (HCC) ICD-10-CM: I48.0 ICD-9-CM: 427.31  9/7/2018 Unknown Microcytic anemia ICD-10-CM: D50.9 ICD-9-CM: 280.9  9/7/2018 Unknown DM (diabetes mellitus) ([facility] [address] ICD-10-CM: E11.9 ICD-9-CM: 250.00  9/7/2018 Unknown HTN (hypertension) ICD-10-CM: I10 
ICD-9-CM: 401.9  9/7/2018 Unknown CAD (coronary artery disease) ICD-10-CM: I25.10 ICD-9-CM: 414.00  9/7/2018 Unknown Hiccup ICD-10-CM: R06.6 ICD-9-CM: 786.8  9/7/2018 Unknown Ileus ([facility] [address] ICD-10-CM: K56.7 ICD-9-CM: 560.1  9/7/2018 Unknown Urine retention ICD-10-CM: R33.9 ICD-9-CM: 788.20  9/7/2018 Unknown Lumbar stenosis ICD-10-CM: M48.061 
ICD-9-CM: 724.02  9/5/2018 Unknown Plan:  
 
- PO Lopressor and Diltiazem are ordered for afib, appeared sinus today on telemetry. - Continue with Xarelto Subjective: No new complaints. No chest pain or shortness of breath. Objective:  
  
Patient Vitals for the past 8 hrs: 
 Temp Pulse Resp BP SpO2  
09/10/18 1000 - 69 19 108/60 93 % 09/10/18 0900 - 96 27 144/60 97 % 09/10/18 0800 - 88 16 128/48 97 % 09/10/18 0700 - 83 15 118/53 97 % 09/10/18 0600 - 86 16 113/56 95 % 09/10/18 0500 - 84 21 120/54 97 % 09/10/18 0403 - 79 17 103/55 97 % 09/10/18 0400 98.1 °F (36.7 °C) - - - -  
09/10/18 0300 - 78 19 121/68 95 % Patient Vitals for the past 96 hrs: 
 Weight  
09/10/18 0000 208 lb (94.3 kg) Intake/Output Summary (Last 24 hours) at 09/10/18 1051 Last data filed at 09/10/18 0322 Gross per 24 hour Intake          4732.16 ml Output             2350 ml Net          2382.16 ml Physical Exam: 
General:  alert, cooperative, no distress Neck:  nontender, no JVD Lungs:  clear to auscultation bilaterally Heart:  irregularly irregular rhythm Abdomen:  abdomen is soft without significant tenderness, masses, organomegaly or guarding Extremities:  extremities normal, atraumatic, no cyanosis or edema Data Review:  
 
Labs: Results:  
   
Chemistry Recent Labs  
   09/09/18 
 0415  09/08/18 
 0400 GLU  160*  132* NA  136  136  
K  3.8  4.1 CL  102  100 CO2  25  27 BUN  12  12 CREA  0.80  0.98  
CA  6.8*  7.4* AGAP  9  9 BUCR  15  12 AP   --   59  
TP   --   6.2* ALB   --   2.6*  
GLOB   --   3.6 AGRAT   --   0.7* CBC w/Diff Recent Labs  
   09/10/18 
 0430  09/09/18 
 0415  09/07/18 
 1243 WBC  3.9*  6.6  10.6 RBC  3.40*  3.78*  4.20* HGB  7.3*  8.2*  9.2* HCT  23.0*  25.4*  29.3*  
PLT  135  144  161 GRANS  67  73  80* LYMPH  22  17*  11* EOS  2  2  0 Cardiac Enzymes No results found for: CPK, CK, CKMMB, CKMB, RCK3, CKMBT, CKNDX, CKND1, TYLER, TROPT, TROIQ, RUFINO, TROPT, TNIPOC, BNP, BNPP Coagulation No results for input(s): PTP, INR, APTT in the last 72 hours. No lab exists for component: INREXT Lipid Panel No results found for: CHOL, CHOLPOCT, CHOLX, CHLST, CHOLV, 450853, HDL, LDL, LDLC, DLDLP, 052386, VLDLC, VLDL, TGLX, TRIGL, TRIGP, TGLPOCT, CHHD, CHHDX  
BNP No results found for: BNP, BNPP, XBNPT Liver Enzymes Recent Labs  
   09/08/18 
 0400 TP  6.2* ALB  2.6* AP  59 SGOT  36  
  
Digoxin Thyroid Studies Lab Results Component Value Date/Time TSH 0.68 09/07/2018 12:43 PM  
    
 
Signed By: Irene Calabrese. Deborah Bell PA-C September 10, 2018

## 2018-09-10 NOTE — PROGRESS NOTES
0800-Received pt resting in bed with eyes closed, easily awakened, continuous hiccups, cooperative, A+Ox4, will continue to monitor, will titrate cardizem appropriately after PO medications given 
0900-Pt seen by Dr. Ruy Guy 1100-Pt spilled coffee, being cleaned and linens changed, fairly tolerated, pt appears anxious about moving 
1250-Pt resting in chair, no sign of distress, vs stable off cardizem drip 1500-Assisted back to bed, pt needs reinforcement to encourage moving 1700-Resting in bed with eyes closed, no sign of distress 1800-TRANSFER - OUT REPORT: 
 
Verbal report given to Haley TREVINO(name) on Matty Husain  being transferred to 2 central(unit) for routine progression of care Report consisted of patients Situation, Background, Assessment and  
Recommendations(SBAR). Information from the following report(s) SBAR, Kardex, Intake/Output, MAR, Recent Results and Cardiac Rhythm Sinus Arrhythmia was reviewed with the receiving nurse. Lines:  
Peripheral IV 09/08/18 Left Forearm (Active) Site Assessment Clean, dry, & intact 9/10/2018 12:00 PM  
Phlebitis Assessment 0 9/10/2018 12:00 PM  
Infiltration Assessment 0 9/10/2018 12:00 PM  
Dressing Status Clean, dry, & intact 9/10/2018 12:00 PM  
Dressing Type Transparent;Tape 9/10/2018 12:00 PM  
Hub Color/Line Status Blue 9/10/2018 12:00 PM  
Alcohol Cap Used Yes 9/10/2018 12:00 PM  
   
Peripheral IV 57/71/95 Right Cephalic (Active) Site Assessment Clean, dry, & intact 9/10/2018 12:00 PM  
Phlebitis Assessment 0 9/10/2018 12:00 PM  
Infiltration Assessment 0 9/10/2018 12:00 PM  
Dressing Status Clean, dry, & intact 9/10/2018 12:00 PM  
Dressing Type Transparent;Tape;Gauze 9/10/2018 12:00 PM  
Hub Color/Line Status Infusing 9/9/2018  2:00 PM  
Action Taken Open ports on tubing capped 9/10/2018 12:00 PM  
Alcohol Cap Used Yes 9/10/2018 12:00 PM  
  
 
Opportunity for questions and clarification was provided.    
 
Patient transported with: 
 Monitor Pt belongings RN Pt chart

## 2018-09-10 NOTE — PROGRESS NOTES
Problem: Pressure Injury - Risk of 
Goal: *Prevention of pressure injury Document Alireza Scale and appropriate interventions in the flowsheet. Outcome: Progressing Towards Goal 
Pressure Injury Interventions: 
Sensory Interventions: Check visual cues for pain Moisture Interventions: Absorbent underpads Activity Interventions: Increase time out of bed Mobility Interventions: Pressure redistribution bed/mattress (bed type) Nutrition Interventions: Document food/fluid/supplement intake Friction and Shear Interventions: Apply protective barrier, creams and emollients

## 2018-09-11 LAB
DATE LAST DOSE: ABNORMAL
GLUCOSE BLD STRIP.AUTO-MCNC: 136 MG/DL (ref 70–110)
GLUCOSE BLD STRIP.AUTO-MCNC: 164 MG/DL (ref 70–110)
GLUCOSE BLD STRIP.AUTO-MCNC: 171 MG/DL (ref 70–110)
GLUCOSE BLD STRIP.AUTO-MCNC: 174 MG/DL (ref 70–110)
HEMOCCULT STL QL: NEGATIVE
REPORTED DOSE,DOSE: ABNORMAL UNITS
REPORTED DOSE/TIME,TMG: 500
VANCOMYCIN TROUGH SERPL-MCNC: 8.9 UG/ML (ref 10–20)

## 2018-09-11 PROCEDURE — 77010033678 HC OXYGEN DAILY

## 2018-09-11 PROCEDURE — 80202 ASSAY OF VANCOMYCIN: CPT | Performed by: INTERNAL MEDICINE

## 2018-09-11 PROCEDURE — 97530 THERAPEUTIC ACTIVITIES: CPT

## 2018-09-11 PROCEDURE — 74011636637 HC RX REV CODE- 636/637: Performed by: ORTHOPAEDIC SURGERY

## 2018-09-11 PROCEDURE — 74011000258 HC RX REV CODE- 258: Performed by: ORTHOPAEDIC SURGERY

## 2018-09-11 PROCEDURE — 36415 COLL VENOUS BLD VENIPUNCTURE: CPT | Performed by: INTERNAL MEDICINE

## 2018-09-11 PROCEDURE — 74011250637 HC RX REV CODE- 250/637: Performed by: INTERNAL MEDICINE

## 2018-09-11 PROCEDURE — 74011250636 HC RX REV CODE- 250/636: Performed by: INTERNAL MEDICINE

## 2018-09-11 PROCEDURE — 82272 OCCULT BLD FECES 1-3 TESTS: CPT | Performed by: INTERNAL MEDICINE

## 2018-09-11 PROCEDURE — 82962 GLUCOSE BLOOD TEST: CPT

## 2018-09-11 PROCEDURE — 65660000000 HC RM CCU STEPDOWN

## 2018-09-11 PROCEDURE — 74011250636 HC RX REV CODE- 250/636: Performed by: ORTHOPAEDIC SURGERY

## 2018-09-11 PROCEDURE — 74011636637 HC RX REV CODE- 636/637: Performed by: INTERNAL MEDICINE

## 2018-09-11 RX ORDER — HYDROCODONE BITARTRATE AND ACETAMINOPHEN 5; 325 MG/1; MG/1
1-2 TABLET ORAL
Status: DISCONTINUED | OUTPATIENT
Start: 2018-09-11 | End: 2018-09-14 | Stop reason: HOSPADM

## 2018-09-11 RX ORDER — PANTOPRAZOLE SODIUM 40 MG/1
40 TABLET, DELAYED RELEASE ORAL
Status: DISCONTINUED | OUTPATIENT
Start: 2018-09-11 | End: 2018-09-14 | Stop reason: HOSPADM

## 2018-09-11 RX ORDER — TAMSULOSIN HYDROCHLORIDE 0.4 MG/1
0.4 CAPSULE ORAL DAILY
Status: DISCONTINUED | OUTPATIENT
Start: 2018-09-11 | End: 2018-09-14 | Stop reason: HOSPADM

## 2018-09-11 RX ORDER — MORPHINE SULFATE 2 MG/ML
2 INJECTION, SOLUTION INTRAMUSCULAR; INTRAVENOUS
Status: DISCONTINUED | OUTPATIENT
Start: 2018-09-11 | End: 2018-09-14 | Stop reason: HOSPADM

## 2018-09-11 RX ORDER — CHLORPROMAZINE HYDROCHLORIDE 25 MG/ML
10 INJECTION INTRAMUSCULAR
Status: DISCONTINUED | OUTPATIENT
Start: 2018-09-11 | End: 2018-09-12

## 2018-09-11 RX ORDER — DILTIAZEM HYDROCHLORIDE 240 MG/1
240 CAPSULE, COATED, EXTENDED RELEASE ORAL DAILY
Status: DISCONTINUED | OUTPATIENT
Start: 2018-09-11 | End: 2018-09-14

## 2018-09-11 RX ORDER — INSULIN GLARGINE 100 [IU]/ML
10 INJECTION, SOLUTION SUBCUTANEOUS DAILY
Status: DISCONTINUED | OUTPATIENT
Start: 2018-09-11 | End: 2018-09-13

## 2018-09-11 RX ADMIN — HYDROCODONE BITARTRATE AND ACETAMINOPHEN 2 TABLET: 5; 325 TABLET ORAL at 21:27

## 2018-09-11 RX ADMIN — METOPROLOL TARTRATE 100 MG: 50 TABLET ORAL at 08:57

## 2018-09-11 RX ADMIN — CHLORPROMAZINE HYDROCHLORIDE 10 MG: 25 INJECTION INTRAMUSCULAR at 15:42

## 2018-09-11 RX ADMIN — PANTOPRAZOLE SODIUM 40 MG: 40 TABLET, DELAYED RELEASE ORAL at 08:59

## 2018-09-11 RX ADMIN — CHLORPROMAZINE HYDROCHLORIDE 10 MG: 25 INJECTION INTRAMUSCULAR at 09:02

## 2018-09-11 RX ADMIN — RANOLAZINE 500 MG: 500 TABLET, FILM COATED, EXTENDED RELEASE ORAL at 18:23

## 2018-09-11 RX ADMIN — METOPROLOL TARTRATE 100 MG: 50 TABLET ORAL at 21:27

## 2018-09-11 RX ADMIN — DILTIAZEM HYDROCHLORIDE 240 MG: 240 CAPSULE, COATED, EXTENDED RELEASE ORAL at 08:55

## 2018-09-11 RX ADMIN — HYDROCODONE BITARTRATE AND ACETAMINOPHEN 1 TABLET: 5; 325 TABLET ORAL at 15:42

## 2018-09-11 RX ADMIN — ASPIRIN 81 MG: 81 TABLET, COATED ORAL at 08:58

## 2018-09-11 RX ADMIN — CHLORPROMAZINE HYDROCHLORIDE 10 MG: 25 INJECTION INTRAMUSCULAR at 21:27

## 2018-09-11 RX ADMIN — TAMSULOSIN HYDROCHLORIDE 0.4 MG: 0.4 CAPSULE ORAL at 22:18

## 2018-09-11 RX ADMIN — MORPHINE SULFATE 2 MG: 2 INJECTION, SOLUTION INTRAMUSCULAR; INTRAVENOUS at 18:26

## 2018-09-11 RX ADMIN — INSULIN LISPRO 2 UNITS: 100 INJECTION, SOLUTION INTRAVENOUS; SUBCUTANEOUS at 17:45

## 2018-09-11 RX ADMIN — SODIUM CHLORIDE 1250 MG: 900 INJECTION, SOLUTION INTRAVENOUS at 05:27

## 2018-09-11 RX ADMIN — PIPERACILLIN SODIUM,TAZOBACTAM SODIUM 3.38 G: 3; .375 INJECTION, POWDER, FOR SOLUTION INTRAVENOUS at 15:42

## 2018-09-11 RX ADMIN — INSULIN LISPRO 2 UNITS: 100 INJECTION, SOLUTION INTRAVENOUS; SUBCUTANEOUS at 12:59

## 2018-09-11 RX ADMIN — Medication 10 ML: at 22:18

## 2018-09-11 RX ADMIN — RANOLAZINE 500 MG: 500 TABLET, FILM COATED, EXTENDED RELEASE ORAL at 08:58

## 2018-09-11 RX ADMIN — SODIUM CHLORIDE 1250 MG: 900 INJECTION, SOLUTION INTRAVENOUS at 20:05

## 2018-09-11 RX ADMIN — RIVAROXABAN 20 MG: 20 TABLET, FILM COATED ORAL at 18:23

## 2018-09-11 RX ADMIN — PIPERACILLIN SODIUM,TAZOBACTAM SODIUM 3.38 G: 3; .375 INJECTION, POWDER, FOR SOLUTION INTRAVENOUS at 08:53

## 2018-09-11 RX ADMIN — INSULIN GLARGINE 10 UNITS: 100 INJECTION, SOLUTION SUBCUTANEOUS at 13:00

## 2018-09-11 RX ADMIN — HYDROCODONE BITARTRATE AND ACETAMINOPHEN 1 TABLET: 5; 325 TABLET ORAL at 08:59

## 2018-09-11 RX ADMIN — INSULIN LISPRO 2 UNITS: 100 INJECTION, SOLUTION INTRAVENOUS; SUBCUTANEOUS at 08:55

## 2018-09-11 RX ADMIN — Medication 10 ML: at 05:27

## 2018-09-11 NOTE — ROUTINE PROCESS
1910 Bedside and Verbal shift change  Received from Jhony Ross RN (outgoing nurse), to ANEESH Ann (oncoming)  Pt. Is AOX 4. IV SL, Pt. denies  pain at this time. Report included the following information SBAR, Kardex, Procedure Summary, Intake/Output, MAR, Recent Lab Results, and  Cardiac Rhythm @ NSR. Will resume care and monitor Pt. Condition. Pt. Educated on call bell when in need of help and assistance. Pt. verbalized understanding. Pt. Head to toe Assessment Done and documented. 2000   Pt. Resting in bed comfortably, no sign of distress. 2100  Pt. Re-educated on call bell when in need of help. Pt. verbalized understanding. 2200  Pt. Made no complaints. 2300  Pt. Denies pain. 2339  Pt. Complaints of hicupps and anxiousness. Given Thorazine and diazepam per mar for pain management. 0100  Pt. Resting comfortably in bed. 
 
0300  Turned to L side to promote bowel movement. 0500 Pt. Able to rest well throughout the shift. 0630  Pt. Able to rest well throughout the shift. Verbal and bedside Shift changed report given to URIEL Asencio (oncoming RN) on Pt. Condition. Report consisted of patients Situation, History, Activities, intake/output,  Background, Assessment and Recommendations(SBAR). Information from the following report(s) Kardex, order Summary, Lab results and MAR was reviewed with the receiving nurse. Opportunity for questions and clarification was provided.

## 2018-09-11 NOTE — PROGRESS NOTES
Ortho rounds complete this morning with Maryann KAHN. All questions answered. Encouraged use of ICS and PT/OT, OOB with assist.  Plan of care is to discharge to rehab facility when medically stable.

## 2018-09-11 NOTE — PROGRESS NOTES
Notified [employer] with regards to back brace not fitting properly. Spoke to a representative of the company, stated will come to the hospital in 1-2 hours to resolve problem. Primary nurse updated.

## 2018-09-11 NOTE — PROGRESS NOTES
Problem: Falls - Risk of 
Goal: *Absence of Falls Document [address] Fall Risk and appropriate interventions in the flowsheet. Outcome: Progressing Towards Goal 
Fall Risk Interventions: 
Mobility Interventions: Patient to call before getting OOB Mentation Interventions: Evaluate medications/consider consulting pharmacy Medication Interventions: Patient to call before getting OOB Elimination Interventions: Call light in reach History of Falls Interventions: Door open when patient unattended Problem: Pressure Injury - Risk of 
Goal: *Prevention of pressure injury Document Alireza Scale and appropriate interventions in the flowsheet. Outcome: Progressing Towards Goal 
Pressure Injury Interventions: 
Sensory Interventions: Assess changes in LOC Moisture Interventions: Absorbent underpads Activity Interventions: Pressure redistribution bed/mattress(bed type) Mobility Interventions: Pressure redistribution bed/mattress (bed type), PT/OT evaluation Nutrition Interventions: Document food/fluid/supplement intake Friction and Shear Interventions: Apply protective barrier, creams and emollients

## 2018-09-11 NOTE — PROGRESS NOTES
Progress Note Post Operative Day: 6 Assessment: 1. Status post  LAMINEC/FACETECT/FORAMIN,LUMBAR [77783] (SPINE LUMBAR POSTERIOR INTERBODY FUSION (PLIF)) LAMINEC/FACETECT/FORAMIN,EACH ADDNL [01918] GA ARTHRODESIS POSTERIOR/POSTEROLATERAL LUMBAR Yuan Zaragoza Forward [21158] SPIN BONE AUTOGRFT LOCAL [52949] for STENOSIS SPONDYL M48.061 M43.16 Lumbar stenosis 9/5/2018,  Progressing - no out of ICU. 2. Urinary retention, rivero replaced - defer to IM 
 
PLAN:   
1. Mobilize. Continue P.T. 
2. WBAT with TLSO 3. Frequent mobilization and SCD for DVT prophylaxis; Xarelto restarted last evening by IM on 9-8-18 4. Discharge Planning, SNF when medically stable HPI: Ryanne Gonzalez is a 70 y.o. male patient without new complaints. He states that RLE pain has completely resolved. No new orthopaedic changes. Blood pressure 139/73, pulse 91, temperature 97.6 °F (36.4 °C), resp. rate 18, height 5' 6\" (1.676 m), weight 94.3 kg (208 lb), SpO2 95 %. CBC w/Diff Lab Results Component Value Date/Time WBC 3.9 (L) 09/10/2018 04:30 AM  
 RBC 3.40 (L) 09/10/2018 04:30 AM  
 HGB 8.4 (L) 09/10/2018 02:00 PM  
 HCT 26.6 (L) 09/10/2018 02:00 PM  
 MCV 67.6 (L) 09/10/2018 04:30 AM  
 MCH 21.5 (L) 09/10/2018 04:30 AM  
 MCHC 31.7 09/10/2018 04:30 AM  
 RDW 16.9 (H) 09/10/2018 04:30 AM  
 Lab Results Component Value Date/Time MONOS 9 09/10/2018 04:30 AM  
 EOS 2 09/10/2018 04:30 AM  
 BASOS 0 09/10/2018 04:30 AM  
 RDW 16.9 (H) 09/10/2018 04:30 AM  
  
  
 
 
Physical Assessment: 
General: in no apparent distress, well developed and well nourished, non-toxic, in no respiratory distress and acyanotic, alert and oriented times 3 Wound: no drainage Extremities:  Motor: UE and LE strength 5/5 throughout bilaterally. Muscle tone and bulk normal. 
 
L hip F/E 5/5 knee F/E 5/5 ankle F/E 5/5, EHL 5/5 R hip F/E 5/5 knee F/E 5/5 ankle F/E 5/5, EHL 5/5 Sensory: Light Touch : BLE intact and equal bilaterally Plantar reflex downwards bilaterally. Patient denies saddle anesthesia Dressing:  CDI, to be reinforced due to loss of integrity DVT Exam:   No exam evidence to suggest DVT. Compartments soft and NT. Radiology: no new ortho studies Dorothy Wolf PA-C 
9/11/2018 Office [phone]

## 2018-09-11 NOTE — PROGRESS NOTES
Bedside report given by Sp Trinidad RN. Pt in bed with HOB elevated, bed locked at lowest position, call bell in reach. Pt is A&OX4 and has purposeful movement in all extremities. Pt IV in RFA and LFA is c/d/i, no swelling, erythema or infiltration noted at this time. Pt dressing to back is c/d/i. Pt shows no signs of distress at this time. Will continue to monitor. 1800 - No change during shift. 2000 -Bedside and Verbal shift change report given to Celena De La O (oncoming nurse) by Aidee Butts (offgoing nurse). Report included the following information SBAR, Kardex and MAR.

## 2018-09-11 NOTE — PROGRESS NOTES
Problem: Mobility Impaired (Adult and Pediatric) Goal: *Acute Goals and Plan of Care (Insert Text) Physical Therapy Goals Initiated 9/5/2018 and to be accomplished within 7 days. 1.  Patient will complete all bed mobility with minimal assistance/contact guard assist in order to prepare for EOB/OOB activity. 2.  Patient will perform sit <> stand with supervision/set-up in order to prepare for OOB/gait activity. 3.  Patient will perform bed to chair transfers with minimal assistance/contact guard assist in order to promote mobility and encourage seated activity to progress towards their prior level of function. 4.  Patient will ambulate 50 feet with supervision/set-up using LRAD in order to prepare for safe negotiation of their environment. Outcome: Progressing Towards Goal 
 
PHYSICAL THERAPY: Daily TREATMENT Note INPATIENT: Medicare: Hospital Day: 7 Patient: Lakisha Cobunr (89 y.o. male)    Date: 9/11/2018 Primary Diagnosis: STENOSIS SPONDYL M48.061 M43.16 Lumbar stenosis Procedure(s) (LRB): 
DECOMPRESSION L1-2 2-5, POSTEROLATERAL SPINE FUSION FLOSPINE L4-5 (N/A), 6 Days Post-Op, Precautions: Spinal 
 
Chart, physical therapy assessment, plan of care and goals were reviewed. PLOF: Independent ASSESSMENT: 
Patient supine in bed, agreeable to participation with PT. Visitors present. MAX x 1 fors supine <> sit. MAX x 2 for sit <> stand. Able to ambulate 4 ft with RW and CGA; requires occasional manual assist for negotiation of the RW. Returned to bed and with MAX A and left supine with all needs within reach. Patient has c/o of R trunk pain that began 1 - 2 hours prior to session; reports that it has continually gotten worse. RN Elif notified of patient's complaint. Progression toward goals: 
      Improving slowly and progressing toward goals PLAN: 
Patient continues to benefit from skilled intervention to address the above impairments. Continue treatment per established plan of care. EDUCATION:  
Education:  Patient was educated on the following topics: gait with RW, strategy for bed mobility. Verbalizes understanding. Barriers to Learning/Limitations: None Compensate with: visual, verbal, tactile, kinesthetic cues/model Discharge Recommendations:  Ottoniel Purcell Further Equipment Recommendations for Discharge:  rolling walker Factors which may impact discharge planning: Medical condition SUBJECTIVE:  
Patient stated I need to walk.  OBJECTIVE DATA SUMMARY:  
Critical Behavior: 
Neurologic State: Alert Orientation Level: Oriented X4 Cognition: Follows commands Safety/Judgement: Fall prevention G CODE:Mobility [id number] Current  CL= 60-79%   Goal  CL= 60-79%. The severity rating is based on the Other [employer] Balance Scale  2/5 [facility] Standing Balance Scale  2/5 
0: Pt performs 25% or less of standing activity (Max assist) CN, 100% impaired. 1: Pt supports self with upper extremities but requires therapist assistance. Pt performs 25-50% of effort (Mod assist) CM, 80% to <100% impaired. 1+: Pt supports self with upper extremities but requires therapist assistance. Pt performs >50% effort. (Min assist). CL, 60% to <80% impaired. 2: Pt supports self independently with both upper extremities (walker, crutches, parallel bars). CL, 60% to <80% impaired. 2+: Pt support self independently with 1 upper extremity (cane, crutch, 1 parallel bar). CK, 40% to <60% impaired. 3: Pt stands without upper extremity support for up to 30 seconds. CK, 40% to <60% impaired. 3+: Pt stands without upper extremity support for 30 seconds or greater. CJ, 20% to <40% impaired. 4: Pt independently moves and returns center of gravity 1-2 inches in one plane. CJ, 20% to <40% impaired.  
4+: Pt independently moves and returns center of gravity 1-2 inches in multiple planes. CI, 1% to <20% impaired. 5: Pt independently moves and returns center of gravity in all planes greater than 2 inches. CH, 0% impaired. Functional Mobility: 
 
 
Functional Status Indep (I) Mod I Super-vision Min A Mod A Max A Total A Assist x2 Verbal cues Additional time Not tested Comments Rolling []  []  [] []    []    []  []  [] [] [] [x] Supine to sit []  []  [] []  []  [x]  []  [] [] [] [] Sit to supine []  []  [] []  []  [x]  []  [] [] [] [] Sit to stand []  []  [] []  []  [x]  []  [] [x] [] []   
Stand to sit []  []  [] []  []  [x]  []  [] [x] [] [] Bed to chair transfers []  []  [] []  []  []  []  [] [] [] [x] Balance Good Khadra End Poor Unable Not tested Comments Sitting static []  [x]  []  []  [] Sitting dynamic []  [x]  []  []  []   
Standing static []  [x]  []  []  []   
Standing dynamic []  [x]  []  []  [] Mobility/Gait:  
Level of Assistance: Contact guard assistance Assistive Device: rolling walker Distance Ambulated: 4 feet Left Lower Extremity: FWB Right Lower Extremity: FWB Base of Support: center of gravity altered Speed/Elena: pace decreased (<100 feet/min) Step Length: left shortened and right shortened Swing Pattern: [facility] Stance: [facility] Gait Abnormalities: step to gait and [facility] Vital Signs Temp: 98.2 °F (36.8 °C) Pulse (Heart Rate): 68    
BP: 125/73 Resp Rate: 18    
O2 Sat (%): 99 % Pain Pain in R trunk, RN Elif notified Activity Tolerance:  
Fair After treatment:  
Patient left in no apparent distress in bed Call bell left within reach Nursing notified Brett Mendez Time Calculation: 24 mins

## 2018-09-11 NOTE — PROGRESS NOTES
Problem: Mobility Impaired (Adult and Pediatric) Goal: *Acute Goals and Plan of Care (Insert Text) Physical Therapy Goals Initiated 9/5/2018 and to be accomplished within 7 days. 1.  Patient will complete all bed mobility with minimal assistance/contact guard assist in order to prepare for EOB/OOB activity. 2.  Patient will perform sit <> stand with supervision/set-up in order to prepare for OOB/gait activity. 3.  Patient will perform bed to chair transfers with minimal assistance/contact guard assist in order to promote mobility and encourage seated activity to progress towards their prior level of function. 4.  Patient will ambulate 50 feet with supervision/set-up using LRAD in order to prepare for safe negotiation of their environment. Outcome: Progressing Towards Goal 
 
PHYSICAL THERAPY: Daily TREATMENT Note INPATIENT: Medicare: Hospital Day: 7 Patient: Kodak Farmer (19 y.o. male)    Date: 9/11/2018 Primary Diagnosis: STENOSIS SPONDYL M48.061 M43.16 Lumbar stenosis Procedure(s) (LRB): 
DECOMPRESSION L1-2 2-5, POSTEROLATERAL SPINE FUSION FLOSPINE L4-5 (N/A), 6 Days Post-Op, Precautions: Spinal 
 
Chart, physical therapy assessment, plan of care and goals were reviewed. PLOF: Independent ASSESSMENT: 
Patient sleeping in bed, arouse by voice and agreeable to participation with PT. MAX x 2 for supine <> sit. MOD A x 2 for sit <> stand. Attempted to ambulated forwards, able to take 2 steps before patient has c/o of sciatica pain. Patient able to take side steps towards head of bed. Returned to bed with MAX A and left in chair position with all needs within reach. C/o of back and sciatica pain with mobility; no rating provided. Progression toward goals: 
      Improving slowly and progressing toward goals PLAN: 
Patient continues to benefit from skilled intervention to address the above impairments. Continue treatment per established plan of care.  
 
EDUCATION:  
 Education:  Patient was educated on the following topics: strategy for bed mobility, transfers, and gait. Verbalizes understanding. Barriers to Learning/Limitations: None Compensate with: visual, verbal, tactile, kinesthetic cues/model Discharge Recommendations:  Ottoniel Purcell Further Equipment Recommendations for Discharge:  rolling walker Factors which may impact discharge planning: medical condition SUBJECTIVE:  
Patient stated I have pain in my leg.  OBJECTIVE DATA SUMMARY:  
Critical Behavior: 
Neurologic State: Alert Orientation Level: Oriented X4 Cognition: Follows commands Safety/Judgement: Fall prevention G CODE:Mobility [id number] Current  CL= 60-79%   Goal  CL= 60-79%. The severity rating is based on the Other [facility] Balance Scale 2/5] [address] Balance Scale 2/5 
0: Pt performs 25% or less of standing activity (Max assist) CN, 100% impaired. 1: Pt supports self with upper extremities but requires therapist assistance. Pt performs 25-50% of effort (Mod assist) CM, 80% to <100% impaired. 1+: Pt supports self with upper extremities but requires therapist assistance. Pt performs >50% effort. (Min assist). CL, 60% to <80% impaired. 2: Pt supports self independently with both upper extremities (walker, crutches, parallel bars). CL, 60% to <80% impaired. 2+: Pt support self independently with 1 upper extremity (cane, crutch, 1 parallel bar). CK, 40% to <60% impaired. 3: Pt stands without upper extremity support for up to 30 seconds. CK, 40% to <60% impaired. 3+: Pt stands without upper extremity support for 30 seconds or greater. CJ, 20% to <40% impaired. 4: Pt independently moves and returns center of gravity 1-2 inches in one plane. CJ, 20% to <40% impaired. 4+: Pt independently moves and returns center of gravity 1-2 inches in multiple planes. CI, 1% to <20% impaired. 5: Pt independently moves and returns center of gravity in all planes greater than 2 inches. CH, 0% impaired. Functional Mobility: 
 
 
Functional Status Indep (I) Mod I Super-vision Min A Mod A Max A Total A Assist x2 Verbal cues Additional time Not tested Comments Rolling []  []  [] []    []    []  []  [] [] [] [] Supine to sit []  []  [] []  []  [x]  []  [x] [] [] [] Sit to supine []  []  [] []  []  [x]  []  [x] [] [] [] Sit to stand []  []  [] []  [x]  []  []  [x] [] [] []   
Stand to sit []  []  [] []  [x]  []  []  [x] [] [] [] Bed to chair transfers []  []  [] []  []  []  []  [] [] [] [] Balance Good [address] Poor Unable Not tested Comments Sitting static []  [x]  []  []  [] Sitting dynamic []  [x]  []  []  []   
Standing static []  [x]  []  []  []   
Standing dynamic []  [x]  []  []  []   
 
 
Vital Signs Temp: 98.2 °F (36.8 °C) Pulse (Heart Rate): 68    
BP: 125/73 Resp Rate: 18    
O2 Sat (%): 99 % Pain: Back pain and sciatica pain with mobility, no rating provided. Activity Tolerance:  
Fair After treatment:  
Patient left in no apparent distress in bed Call bell left within reach Nursing notified Rut Varela Time Calculation: 23 mins

## 2018-09-11 NOTE — PROGRESS NOTES
[facility] - Progress Note Admit Date: 9/5/2018 Patient seen and examined independently. Appears more comfortable. No CP. No hiccups today. Agree with assessment and plan as noted below. Sobia Marie MD 
Assessment:  
 
Hospital Problems  Never Reviewed Codes Class Noted POA Normal cardiac stress test ICD-10-CM: Z13.6 ICD-9-CM: V81.2  9/9/2018 Unknown Overview Signed 9/9/2018 11:48 AM by Clementina Schilling MD  
  Chikis Reasons 8/22/2018 normal perfusion scan EF 65% Paroxysmal A-fib (HCC) ICD-10-CM: I48.0 ICD-9-CM: 427.31  9/7/2018 Unknown Microcytic anemia ICD-10-CM: D50.9 ICD-9-CM: 280.9  9/7/2018 Unknown DM (diabetes mellitus) ([facility] [address] ICD-10-CM: E11.9 ICD-9-CM: 250.00  9/7/2018 Unknown HTN (hypertension) ICD-10-CM: I10 
ICD-9-CM: 401.9  9/7/2018 Unknown CAD (coronary artery disease) ICD-10-CM: I25.10 ICD-9-CM: 414.00  9/7/2018 Unknown Hiccup ICD-10-CM: R06.6 ICD-9-CM: 786.8  9/7/2018 Unknown Ileus ([facility] [address] ICD-10-CM: K56.7 ICD-9-CM: 560.1  9/7/2018 Unknown Urine retention ICD-10-CM: R33.9 ICD-9-CM: 788.20  9/7/2018 Unknown Lumbar stenosis ICD-10-CM: M48.061 
ICD-9-CM: 724.02  9/5/2018 Unknown Plan: - Sinus on telemetry today, continue with PO Lopressor. BP stable. - On Xarelto for stroke prophylaxis - Continue with rest of cardiac regimen. Subjective: No new complaints. Hiccups have improved. Objective:  
  
Patient Vitals for the past 8 hrs: 
 Temp Pulse Resp BP SpO2  
09/11/18 1216 98.2 °F (36.8 °C) 68 18 125/73 99 % 09/11/18 0855 - 91 - 139/73 - Patient Vitals for the past 96 hrs: 
 Weight  
09/10/18 0000 208 lb (94.3 kg) Intake/Output Summary (Last 24 hours) at 09/11/18 1238 Last data filed at 09/11/18 6028 Gross per 24 hour Intake                0 ml Output             2075 ml Net            -2075 ml Physical Exam: General:  alert, cooperative, no distress Neck:  nontender, no JVD Lungs:  clear to auscultation bilaterally Heart:  regular rate and rhythm, S1, S2 normal, no murmur, click, rub or gallop Abdomen:  abdomen is soft without significant tenderness, masses, organomegaly or guarding Extremities:  extremities normal, atraumatic, no cyanosis or edema Data Review:  
 
Labs: Results:  
   
Chemistry Recent Labs  
   09/09/18 
 6340 GLU  160* NA  136  
K  3.8 CL  102 CO2  25 BUN  12  
CREA  0.80 CA  6.8* AGAP  9  
BUCR  15 CBC w/Diff Recent Labs  
   09/10/18 
 1400  09/10/18 
 0430  09/09/18 
 0415 WBC   --   3.9*  6.6  
RBC   --   3.40*  3.78* HGB  8.4*  7.3*  8.2* HCT  26.6*  23.0*  25.4* PLT   --   135  144 GRANS   --   67  73 LYMPH   --   22  17* EOS   --   2  2 Cardiac Enzymes No results found for: CPK, CK, CKMMB, CKMB, RCK3, CKMBT, CKNDX, CKND1, TYLER, TROPT, TROIQ, RUFINO, TROPT, TNIPOC, BNP, BNPP Coagulation No results for input(s): PTP, INR, APTT in the last 72 hours. No lab exists for component: INREXT Lipid Panel No results found for: CHOL, CHOLPOCT, CHOLX, CHLST, CHOLV, 489066, HDL, LDL, LDLC, DLDLP, 496385, VLDLC, VLDL, TGLX, TRIGL, TRIGP, TGLPOCT, CHHD, CHHDX  
BNP No results found for: BNP, BNPP, XBNPT Liver Enzymes No results for input(s): TP, ALB, TBIL, AP, SGOT, GPT in the last 72 hours. No lab exists for component: DBIL Digoxin Thyroid Studies Lab Results Component Value Date/Time TSH 0.68 09/07/2018 12:43 PM  
    
 
Signed By: Evaristo Holter. Russ Lozada PA-C September 11, 2018

## 2018-09-12 ENCOUNTER — APPOINTMENT (OUTPATIENT)
Dept: GENERAL RADIOLOGY | Age: 68
DRG: 460 | End: 2018-09-12
Attending: INTERNAL MEDICINE
Payer: MEDICARE

## 2018-09-12 ENCOUNTER — APPOINTMENT (OUTPATIENT)
Dept: NON INVASIVE DIAGNOSTICS | Age: 68
DRG: 460 | End: 2018-09-12
Attending: PHYSICIAN ASSISTANT
Payer: MEDICARE

## 2018-09-12 LAB
BNP SERPL-MCNC: 830 PG/ML (ref 0–900)
GLUCOSE BLD STRIP.AUTO-MCNC: 146 MG/DL (ref 70–110)
GLUCOSE BLD STRIP.AUTO-MCNC: 154 MG/DL (ref 70–110)
GLUCOSE BLD STRIP.AUTO-MCNC: 198 MG/DL (ref 70–110)
GLUCOSE BLD STRIP.AUTO-MCNC: 218 MG/DL (ref 70–110)

## 2018-09-12 PROCEDURE — 74011250636 HC RX REV CODE- 250/636: Performed by: INTERNAL MEDICINE

## 2018-09-12 PROCEDURE — 74011000258 HC RX REV CODE- 258: Performed by: INTERNAL MEDICINE

## 2018-09-12 PROCEDURE — 65660000000 HC RM CCU STEPDOWN

## 2018-09-12 PROCEDURE — 74011250637 HC RX REV CODE- 250/637: Performed by: INTERNAL MEDICINE

## 2018-09-12 PROCEDURE — 74011636637 HC RX REV CODE- 636/637: Performed by: INTERNAL MEDICINE

## 2018-09-12 PROCEDURE — 97530 THERAPEUTIC ACTIVITIES: CPT

## 2018-09-12 PROCEDURE — 97164 PT RE-EVAL EST PLAN CARE: CPT

## 2018-09-12 PROCEDURE — 74011250637 HC RX REV CODE- 250/637: Performed by: ORTHOPAEDIC SURGERY

## 2018-09-12 PROCEDURE — 82962 GLUCOSE BLOOD TEST: CPT

## 2018-09-12 PROCEDURE — 74011636637 HC RX REV CODE- 636/637: Performed by: ORTHOPAEDIC SURGERY

## 2018-09-12 PROCEDURE — 83880 ASSAY OF NATRIURETIC PEPTIDE: CPT | Performed by: PHYSICIAN ASSISTANT

## 2018-09-12 PROCEDURE — 36415 COLL VENOUS BLD VENIPUNCTURE: CPT | Performed by: PHYSICIAN ASSISTANT

## 2018-09-12 PROCEDURE — 74011000258 HC RX REV CODE- 258: Performed by: ORTHOPAEDIC SURGERY

## 2018-09-12 PROCEDURE — 77010033678 HC OXYGEN DAILY

## 2018-09-12 PROCEDURE — 71046 X-RAY EXAM CHEST 2 VIEWS: CPT

## 2018-09-12 PROCEDURE — 74011250636 HC RX REV CODE- 250/636: Performed by: PHYSICIAN ASSISTANT

## 2018-09-12 PROCEDURE — 74011250636 HC RX REV CODE- 250/636: Performed by: ORTHOPAEDIC SURGERY

## 2018-09-12 RX ORDER — FUROSEMIDE 10 MG/ML
40 INJECTION INTRAMUSCULAR; INTRAVENOUS ONCE
Status: COMPLETED | OUTPATIENT
Start: 2018-09-12 | End: 2018-09-12

## 2018-09-12 RX ORDER — CHLORPROMAZINE HYDROCHLORIDE 25 MG/ML
25 INJECTION INTRAMUSCULAR
Status: DISCONTINUED | OUTPATIENT
Start: 2018-09-12 | End: 2018-09-14 | Stop reason: HOSPADM

## 2018-09-12 RX ORDER — DEXAMETHASONE SODIUM PHOSPHATE 4 MG/ML
4 INJECTION, SOLUTION INTRA-ARTICULAR; INTRALESIONAL; INTRAMUSCULAR; INTRAVENOUS; SOFT TISSUE EVERY 6 HOURS
Status: COMPLETED | OUTPATIENT
Start: 2018-09-13 | End: 2018-09-13

## 2018-09-12 RX ORDER — DEXAMETHASONE SODIUM PHOSPHATE 4 MG/ML
10 INJECTION, SOLUTION INTRA-ARTICULAR; INTRALESIONAL; INTRAMUSCULAR; INTRAVENOUS; SOFT TISSUE ONCE
Status: COMPLETED | OUTPATIENT
Start: 2018-09-12 | End: 2018-09-12

## 2018-09-12 RX ADMIN — MORPHINE SULFATE 2 MG: 2 INJECTION, SOLUTION INTRAMUSCULAR; INTRAVENOUS at 09:25

## 2018-09-12 RX ADMIN — PIPERACILLIN SODIUM,TAZOBACTAM SODIUM 3.38 G: 3; .375 INJECTION, POWDER, FOR SOLUTION INTRAVENOUS at 00:39

## 2018-09-12 RX ADMIN — ASPIRIN 81 MG: 81 TABLET, COATED ORAL at 10:58

## 2018-09-12 RX ADMIN — INSULIN LISPRO 4 UNITS: 100 INJECTION, SOLUTION INTRAVENOUS; SUBCUTANEOUS at 18:03

## 2018-09-12 RX ADMIN — IRON SUCROSE 200 MG: 20 INJECTION, SOLUTION INTRAVENOUS at 14:52

## 2018-09-12 RX ADMIN — SODIUM CHLORIDE 1000 MG: 900 INJECTION, SOLUTION INTRAVENOUS at 12:58

## 2018-09-12 RX ADMIN — CHLORPROMAZINE HYDROCHLORIDE 25 MG: 25 INJECTION INTRAMUSCULAR at 22:28

## 2018-09-12 RX ADMIN — RANOLAZINE 500 MG: 500 TABLET, FILM COATED, EXTENDED RELEASE ORAL at 10:58

## 2018-09-12 RX ADMIN — INSULIN LISPRO 2 UNITS: 100 INJECTION, SOLUTION INTRAVENOUS; SUBCUTANEOUS at 11:30

## 2018-09-12 RX ADMIN — METOPROLOL TARTRATE 100 MG: 50 TABLET ORAL at 10:58

## 2018-09-12 RX ADMIN — FUROSEMIDE 40 MG: 10 INJECTION, SOLUTION INTRAMUSCULAR; INTRAVENOUS at 10:52

## 2018-09-12 RX ADMIN — CHLORPROMAZINE HYDROCHLORIDE 10 MG: 25 INJECTION INTRAMUSCULAR at 14:31

## 2018-09-12 RX ADMIN — PIPERACILLIN SODIUM,TAZOBACTAM SODIUM 3.38 G: 3; .375 INJECTION, POWDER, FOR SOLUTION INTRAVENOUS at 15:59

## 2018-09-12 RX ADMIN — RANOLAZINE 500 MG: 500 TABLET, FILM COATED, EXTENDED RELEASE ORAL at 18:08

## 2018-09-12 RX ADMIN — PIPERACILLIN SODIUM,TAZOBACTAM SODIUM 3.38 G: 3; .375 INJECTION, POWDER, FOR SOLUTION INTRAVENOUS at 09:00

## 2018-09-12 RX ADMIN — DEXAMETHASONE SODIUM PHOSPHATE 10 MG: 4 INJECTION, SOLUTION INTRAMUSCULAR; INTRAVENOUS at 19:46

## 2018-09-12 RX ADMIN — PANTOPRAZOLE SODIUM 40 MG: 40 TABLET, DELAYED RELEASE ORAL at 06:49

## 2018-09-12 RX ADMIN — CHLORPROMAZINE HYDROCHLORIDE 10 MG: 25 INJECTION INTRAMUSCULAR at 08:58

## 2018-09-12 RX ADMIN — Medication 10 ML: at 21:34

## 2018-09-12 RX ADMIN — SODIUM CHLORIDE 1000 MG: 900 INJECTION, SOLUTION INTRAVENOUS at 05:09

## 2018-09-12 RX ADMIN — ACETAMINOPHEN 650 MG: 325 TABLET, FILM COATED ORAL at 10:59

## 2018-09-12 RX ADMIN — HYDROCODONE BITARTRATE AND ACETAMINOPHEN 2 TABLET: 5; 325 TABLET ORAL at 18:43

## 2018-09-12 RX ADMIN — METOPROLOL TARTRATE 100 MG: 50 TABLET ORAL at 21:32

## 2018-09-12 RX ADMIN — RIVAROXABAN 20 MG: 20 TABLET, FILM COATED ORAL at 18:08

## 2018-09-12 RX ADMIN — DILTIAZEM HYDROCHLORIDE 240 MG: 240 CAPSULE, COATED, EXTENDED RELEASE ORAL at 10:57

## 2018-09-12 RX ADMIN — CHLORPROMAZINE HYDROCHLORIDE 10 MG: 25 INJECTION INTRAMUSCULAR at 02:36

## 2018-09-12 RX ADMIN — CHLORPROMAZINE HYDROCHLORIDE 10 MG: 25 INJECTION INTRAMUSCULAR at 18:05

## 2018-09-12 RX ADMIN — Medication 10 ML: at 14:00

## 2018-09-12 RX ADMIN — INSULIN LISPRO 2 UNITS: 100 INJECTION, SOLUTION INTRAVENOUS; SUBCUTANEOUS at 10:45

## 2018-09-12 RX ADMIN — INSULIN GLARGINE 10 UNITS: 100 INJECTION, SOLUTION SUBCUTANEOUS at 09:00

## 2018-09-12 NOTE — PROGRESS NOTES
1049: PT orders received and chart reviewed. Patient off floor for x - ray. Will f/u with patient for afternoon session. Bong Wild PT, DPT Office extension: [phone] Pager #: [phone]

## 2018-09-12 NOTE — PROGRESS NOTES
Pharmacy Dosing Services: Vancomycin Indication: Upper Respiratory Infection Day of therapy: 2 Other Antimicrobials (Include dose, start day & day of therapy): 
 
Pip/tazo 3.375 gm IV every 8 hours extended infusion Loading dose (date given): 2000mg Current Maintenance dose: 1,250 mg every 12 hours Goal Vancomycin Level: 15-20 
(Trough 15-20 for most infections, 20 for meningitis/osteomyelitis, pre-HD level ~25) Vancomycin Level (if drawn): 
8.9 mcg/mL after 11.23 hours Significant Cultures:  
Blood culture taken 2018 Renal function stable? (unstable defined as SCr increase of 0.5 mg/dL or > 50% increase from baseline, whichever is greater) (Y/N): Y  
 
CAPD, Hemodialysis or Renal Replacement Therapy (Y/N): N Recent Labs  
   09/10/18 
 0430  18 
 0415 CREA   --   0.80 BUN   --   12 WBC  3.9*  6.6 Temp (24hrs), Av.4 °F (36.9 °C), Min:97.6 °F (36.4 °C), Max:99.7 °F (37.6 °C) Creatinine Clearance (Creatinine Clearance (ml/min)): Estimated Creatinine Clearance: 93.7 mL/min (based on Cr of 0.8). Regimen assessment: Dose increased to 1,000 mg every 8 hours Maintenance dose: 1,000 mg every 8 hours Next scheduled level: 2018 at 0330 Pharmacy will follow daily and adjust medications as appropriate for renal function and/or serum levels. Thank you, MEG Calabrese

## 2018-09-12 NOTE — PROGRESS NOTES
Problem: Falls - Risk of 
Goal: *Absence of Falls Document Jovita Lozada Fall Risk and appropriate interventions in the flowsheet. Outcome: Progressing Towards Goal 
Fall Risk Interventions: 
Mobility Interventions: Patient to call before getting OOB Mentation Interventions: Adequate sleep, hydration, pain control, Eyeglasses and hearing aids, Toileting rounds Medication Interventions: Patient to call before getting OOB, Teach patient to arise slowly Elimination Interventions: Call light in reach, Urinal in reach, Patient to call for help with toileting needs History of Falls Interventions: Door open when patient unattended Problem: Pressure Injury - Risk of 
Goal: *Prevention of pressure injury Document Alireza Scale and appropriate interventions in the flowsheet. Outcome: Progressing Towards Goal 
Pressure Injury Interventions: 
Sensory Interventions: Assess changes in LOC, Check visual cues for pain, Keep linens dry and wrinkle-free, Minimize linen layers Moisture Interventions: Absorbent underpads, Minimize layers Activity Interventions: Pressure redistribution bed/mattress(bed type) Mobility Interventions: Pressure redistribution bed/mattress (bed type) Nutrition Interventions: Document food/fluid/supplement intake Friction and Shear Interventions: Lift sheet

## 2018-09-12 NOTE — PROGRESS NOTES
Nutrition initial assessment/Plan of care RECOMMENDATIONS:  
1. Consistent CHO Diet 2. Monitor weight, labs and PO intake 3. RD to follow GOALS:  
1. PO intake meets >75% of protein/calorie needs by 9/17 2. Weight Maintenance (+/- 1-2) by 9/19 ASSESSMENT:  
Wt status is classified as obese per BMI of 33.6. Variable PO intake. Labs noted. BG range (136-198) over the past 24 hours. Nutrition recommendations listed. RD to follow. Nutrition Diagnoses:  
Obesity related to excessive energy intake PTA as evidenced by a BMI of 33.6 Nutrition Risk:  [] High  [x] Moderate []  Low SUBJECTIVE/OBJECTIVE:  
 Pt admitted for lumbar stenosis. PMHx including CAD, HTN, DM, Afib and Meniere's disease. Pt seen in room OOB in chair eating lunch; with wife at bedside. Wife brought in some food from home. Observed ~25% of meal consumed during visit. Reports having a poor appetite for the past week (most likely d/t ileus; now resolved). Stated that the smell of food makes him nauseous and he has the hiccups. Denies having any food allergies or problems chewing/swallowing. Stated appetite is normally good at home. Does not want nutritional supplements at this time. Encouraged intake and will monitor. Information Obtained from:  
 [x] Chart Review [x] Patient [x] Family/Caregiver 
 [] Nurse/Physician 
 [] Interdisciplinary Meeting/Rounds Diet: Consistent CHO Medications: [x] Reviewed Allergies: [x] Reviewed Patient Active Problem List  
Diagnosis Code  Lumbar stenosis M48.061  
 Paroxysmal A-fib (HCC) I48.0  Microcytic anemia D50.9  DM (diabetes mellitus) ([address] E11.9  
 HTN (hypertension) I10  
 CAD (coronary artery disease) I25.10  Hiccup R06.6  Ileus ([address] K56.7  Urine retention R33.9  Normal cardiac stress test Z13.6 Past Medical History:  
Diagnosis Date  Atrial fibrillation ([facility] [address]  CAD (coronary artery disease)  Diabetes ([facility] [address]  Hypertension  Meniere's disease Labs:  Lab Results Component Value Date/Time Sodium 136 09/09/2018 04:15 AM  
 Potassium 3.8 09/09/2018 04:15 AM  
 Chloride 102 09/09/2018 04:15 AM  
 CO2 25 09/09/2018 04:15 AM  
 Anion gap 9 09/09/2018 04:15 AM  
 Glucose 160 (H) 09/09/2018 04:15 AM  
 BUN 12 09/09/2018 04:15 AM  
 Creatinine 0.80 09/09/2018 04:15 AM  
 Calcium 6.8 (L) 09/09/2018 04:15 AM  
 Magnesium 1.4 (L) 12/04/2010 04:05 AM  
 Albumin 2.6 (L) 09/08/2018 04:00 AM  
 
Anthropometrics: BMI (calculated): 33.6 Last 3 Recorded Weights in this Encounter 08/31/18 1414 09/05/18 0639 09/10/18 0000 Weight: 86.2 kg (190 lb) 85.1 kg (187 lb 9 oz) 94.3 kg (208 lb) Ht Readings from Last 1 Encounters:  
08/31/18 5' 6\" (1.676 m) Weight Metrics 9/10/2018 9/5/2018 Weight 208 lb -  
BMI - 33.57 kg/m2 Patient Vitals for the past 100 hrs: 
 % Diet Eaten 09/11/18 0701 0 % 09/10/18 1055 25 % 09/09/18 1700 50 % 09/09/18 1300 10 % 09/09/18 1000 50 % 09/08/18 1700 75 % 09/08/18 1200 25 % IBW: 142 lb %IBW: 146% [] Weight Loss 
[x] Weight Gain ? [] Weight Stable Estimated Nutrition Needs: [x] MSJ  [] Other: 
Calories: 5136-0297 kcal Based on:   [x] Actual BW   
Protein:   94 g Based on:   [x] Actual BW Fluid:       2200- 2364 ml Based on:   [x] Actual BW  
 
 [x] No Cultural, Islam or ethnic dietary need identified. [] Cultural, Islam and ethnic food preferences identified and addressed Wt Status:  [] Normal (18.6 - 24.9) [] Underweight (<18.5) [] Overweight (25 - 29.9) [x] Mild Obesity (30 - 34.9)  [] Moderate Obesity (35 - 39.9) [] Morbid Obesity (40+) Nutrition Problems Identified:  
[x] Suboptimal PO intake  
[] Food Allergies [] Difficulty chewing/swallowing/poor dentition 
[] Constipation/Diarrhea [x] Nausea/Vomiting  
[] None 
[] Other:  
 
Plan:  
[x] Therapeutic Diet 
[]  Obtained/adjusted food preferences/tolerances and/or snacks options []  Supplements added  
[] Occupational therapy following for feeding techniques []  HS snack added  
[]  Modify diet texture  
[]  Modify diet for food allergies []  Educate patient  
[]  Assist with menu selection  
[x]  Monitor PO intake on meal rounds  
[x]  Continue inpatient monitoring and intervention  
[]  Participated in discharge planning/Interdisciplinary rounds/Team meetings  
[]  Other:  
 
Education Needs: 
 [] Not appropriate for teaching at this time due to: 
 [x] Identified and addressed Nutrition Monitoring and Evaluation: 
[x] Continue ongoing monitoring and intervention 
[] Anjana Solis

## 2018-09-12 NOTE — PROGRESS NOTES
Progress Note Post Operative Day: 7 Assessment: 1. Status post LAMINEC/FACETECT/FORAMIN,LUMBAR [67184] (SPINE LUMBAR POSTERIOR INTERBODY FUSION (PLIF)) LAMINEC/FACETECT/FORAMIN,EACH ADDNL [02149] OK ARTHRODESIS POSTERIOR/POSTEROLATERAL LUMBAR Marianna Martin Seats [28863] SPIN BONE AUTOGRFT LOCAL [38409] for STENOSIS SPONDYL M48.061 M43.16 Lumbar stenosis 9/5/2018 PLAN:   
1. Mobilize. Continue P.T., when medically stable 2. WBAT with TLSO 3. Xarelto for DVT prophylaxis 4. Discharge Planning, will need SNF with cleared for discharge 5. Patient undergoing cardiology and medicine evaluations. HPI: Sherrie Lunsford is a 70 y.o. male patient without new orthopaedic changes. Patient still c/o hiccups. Blood pressure 148/76, pulse 97, temperature (!) 100.5 °F (38.1 °C), resp. rate 17, height 5' 6\" (1.676 m), weight 94.3 kg (208 lb), SpO2 98 %. CBC w/Diff Lab Results Component Value Date/Time WBC 3.9 (L) 09/10/2018 04:30 AM  
 RBC 3.40 (L) 09/10/2018 04:30 AM  
 HGB 8.4 (L) 09/10/2018 02:00 PM  
 HCT 26.6 (L) 09/10/2018 02:00 PM  
 MCV 67.6 (L) 09/10/2018 04:30 AM  
 MCH 21.5 (L) 09/10/2018 04:30 AM  
 MCHC 31.7 09/10/2018 04:30 AM  
 RDW 16.9 (H) 09/10/2018 04:30 AM  
 Lab Results Component Value Date/Time MONOS 9 09/10/2018 04:30 AM  
 EOS 2 09/10/2018 04:30 AM  
 BASOS 0 09/10/2018 04:30 AM  
 RDW 16.9 (H) 09/10/2018 04:30 AM  
  
  
 
 
Physical Assessment: 
General: alert and oriented times 3 Wound: no drainage Extremities:  L hip F/E 5/5 knee F/E 5/5 ankle F/E 5/5, EHL 5/5 R hip F/E 5/5 knee F/E 5/5 ankle F/E 5/5, EHL 5/5 
  
Sensory: 
  Light Touch : BLE intact and equal bilaterally 
   
Plantar reflex downwards bilaterally.  
  
Patient denies saddle anesthesia Dressing:  CDI, may be removed - now post day 9 DVT Exam:   No exam evidence to suggest DVT. Compartments soft and NT. Radiology: no new ortho studies Sunshine Gregory PA-C 
9/12/2018 Office [phone]

## 2018-09-12 NOTE — PROGRESS NOTES
Problem: Mobility Impaired (Adult and Pediatric) Goal: *Acute Goals and Plan of Care (Insert Text) Physical Therapy Goals: CONTINUE GOALS Initiated 9/5/2018 and to be accomplished within 7 days. 1.  Patient will complete all bed mobility with minimal assistance/contact guard assist in order to prepare for EOB/OOB activity. 2.  Patient will perform sit <> stand with supervision/set-up in order to prepare for OOB/gait activity. 3.  Patient will perform bed to chair transfers with minimal assistance/contact guard assist in order to promote mobility and encourage seated activity to progress towards their prior level of function. 4.  Patient will ambulate 50 feet with supervision/set-up using LRAD in order to prepare for safe negotiation of their environment. Outcome: Progressing Towards Goal 
PHYSICAL THERAPY: Re-evaluation INPATIENT: Medicare: Hospital Day: 8 Patient: Kodak Farmer (35 y.o. male)    Date: 9/12/2018 Primary Diagnosis: STENOSIS SPONDYL M48.061 M43.16 Lumbar stenosis Procedure(s) (LRB): 
DECOMPRESSION L1-2 2-5, POSTEROLATERAL SPINE FUSION FLOSPINE L4-5 (N/A), 7 Days Post-Op, Precautions: Spinal  
 
PLOF: Independent ASSESSMENT: 
Patient supine in bed, agreeable to participation with PT. MAX x 2 for supine <> sit. Braced donned. MAX x 2 for sit <> stand. Verbal cuing for upright posture. Patient ambulated x 4 feet towards recliner with CGA and RW. Verbal cuing to sit without bending forwards. Patient left sitting in chair with all needs within reach. Left sitting with tray in front. Patient continues to have c/o of radicular pain with mobility. Instructed to sit in chair no longer than 1 hour without at least standing to relieve pressure on back. EDUCATION:  
Education:  Patient was educated on the following topics: strategy and safety with mobility. Verbalizes understanding. Barriers to Learning/Limitations: None Compensate with: visual, verbal, tactile, kinesthetic cues/model Patient's progression toward goals since last assessment:  Patient continues to require assist with mobility but now requires less time and cuing with mobility. He has started ambulating short distance and is able to tolerate sitting up for meals. He was transferred to the ICU during his admission d/t elevated HR. Upon return to the floor he has shown slow but steady progress with PT. PLAN: 
Goals have been updated based on progression since last assessment. Patient continues to benefit from skilled intervention to address the above impairments. Continue to follow the patient 1-2 times per day/4-7 days per week to address goals. Planned Interventions: 
[x]     Bed Mobility Training          [x]     Neuromuscular Re-Education 
[x]     Transfer Training                []    Orthotic/Prosthetic Training 
[x]     Gait Training                       []     Modalities [x]     Therapeutic Exercises       []     Edema Management/Control 
[x]     Therapeutic Activities         [x]     Patient and Family Training/Education 
[]     Other (comment): 
Discharge Recommendations: Ottoniel Purcell Further Equipment Recommendations for Discharge: rolling walker SUBJECTIVE:  
Patient stated I have the leg pain again.  OBJECTIVE DATA SUMMARY:  
 
G CODES:  Mobility [id number] Current  CL= 60-79%   Goal  CL= 60-79%. The severity rating is based on the Other ansas [employer] Scale 2/5 209 [address] Standing Balance Scale 2/5 
0: Pt performs 25% or less of standing activity (Max assist) CN, 100% impaired. 1: Pt supports self with upper extremities but requires therapist assistance. Pt performs 25-50% of effort (Mod assist) CM, 80% to <100% impaired. 1+: Pt supports self with upper extremities but requires therapist assistance. Pt performs >50% effort. (Min assist). CL, 60% to <80% impaired. 2: Pt supports self independently with both upper extremities (walker, crutches, parallel bars). CL, 60% to <80% impaired. 2+: Pt support self independently with 1 upper extremity (cane, crutch, 1 parallel bar). CK, 40% to <60% impaired. 3: Pt stands without upper extremity support for up to 30 seconds. CK, 40% to <60% impaired. 3+: Pt stands without upper extremity support for 30 seconds or greater. CJ, 20% to <40% impaired. 4: Pt independently moves and returns center of gravity 1-2 inches in one plane. CJ, 20% to <40% impaired. 4+: Pt independently moves and returns center of gravity 1-2 inches in multiple planes. CI, 1% to <20% impaired. 5: Pt independently moves and returns center of gravity in all planes greater than 2 inches. CH, 0% impaired. Critical Behavior: 
Neurologic State: Alert Orientation Level: Oriented X4 Cognition: Follows commands Safety/Judgement: Fall prevention Tone : normal 
Sensation: NT 
Range Of Motion: [facility] Functional Mobility: 
 
 
Functional Status Indep (I) Mod I Super-vision Min A Mod A Max A Total A Assist x2 Verbal cues Additional time Not tested Comments Rolling []  []  [] []    []    [x]  []  [] [] [] [] Supine to sit []  []  [] []  []  [x]  []  [x] [] [] [] Sit to supine []  []  [] []  []  [x]  []  [] [] [] [] Sit to stand []  []  [] []  []  [x]  []  [] [] [] []   
Stand to sit []  []  [] []  []  [x]  []  [x] [] [] [] Bed to chair transfers []  []  [] []  []  []  []  [] [] [] [x] Balance Good Roxanna Hamilton Poor Unable Not tested Comments Sitting static []  [x]  []  []  [] Sitting dynamic []  [x]  []  []  []   
Standing static []  [x]  []  []  []   
Standing dynamic []  [x]  []  []  [] Mobility/Gait:  
Level of Assistance: Contact guard assistance Assistive Device: rolling walker Distance Ambulated: 4 feet Left Lower Extremity: FWB Right Lower Extremity: FWB 
 Base of Support: center of gravity altered Speed/Elena: pace decreased (<100 feet/min) Step Length: [facility] Swing Pattern: [facility] Stance: [facility] Gait Abnormalities: altered arm swing and decreased step clearance Pain: Sciatic leg pain with mobility. Vital Signs Temp: 98.6 °F (37 °C) Pulse (Heart Rate): 78 BP: 104/65 Resp Rate: 17    
O2 Sat (%): 96 % Activity Tolerance:  
Fair Please refer to the flowsheet for vital signs taken during this treatment. After treatment:  
[x]  Patient left in no apparent distress sitting up in chair 
[]  Patient left in no apparent distress in bed 
[x]  Call bell left within reach 
[]  Nursing notified 
[x]  Caregiver present 
[]  Bed alarm activated Maddy Mast Time Calculation: 24 mins

## 2018-09-12 NOTE — PROGRESS NOTES
[facility] - Progress Note Admit Date: 9/5/2018 I saw, evaluated, interviewed and examined the patient personally. I agree with the findings and plan of care as documented below with PAMARIELENA note Agree with checking limited ECHO, BNP Lasix once today ECHO today limited for EF Most likely his dyspnea is from on going hiccups. Defer to PCP Continue anticoagulation for stroke prophylaxis and CCB for rate control. Renato Haji MD 
 
 
 
Assessment:  
 
- Status post lumbar spinal surgery 09/05/18 
- CAD: cardiac catheterization 09/2011 demonstrated totally occluded RCA and severe distal LAD and CCx disease. 
- Fever, possible infiltrate on CXR 09/8/18 
- Nuclear stress test with inferolateral ischemia 08/21/18, normal LVEF 
- Paroxysmal afib, on Xarelto - Post op anemia 
- Hiccups - Diabetes Mellitus 
- HTN Plan:  
 
- Check BNP, IV Lasix x1 
- Has been in sinus rhythm, continue with Diltiazem - Xarelto for stroke prophylaxis - Antibiotic coverage per primary team 
 
Subjective:  
 
Hiccups. Describes some possible PND last night. Right heel pain, bilateral foot swelling Objective:  
  
Patient Vitals for the past 8 hrs: 
 Temp Pulse Resp BP SpO2  
09/12/18 0801 (!) 100.5 °F (38.1 °C) 97 17 148/76 98 % 09/12/18 0630 99.5 °F (37.5 °C) 88 17 135/75 99 % Patient Vitals for the past 96 hrs: 
 Weight  
09/10/18 0000 208 lb (94.3 kg) Intake/Output Summary (Last 24 hours) at 09/12/18 0915 Last data filed at 09/12/18 6582 Gross per 24 hour Intake                0 ml Output             3900 ml Net            -3900 ml Physical Exam: 
General:  cooperative, no distress, hiccups Neck:  nontender, no JVD Lungs:  Mild crackles at base Heart:  regular rate and rhythm, S1, S2 normal, no murmur, click, rub or gallop Abdomen:  abdomen is soft without significant tenderness, masses, organomegaly or guarding Extremities:  Non-pitting bilateral LE edema Data Review:  
 
Labs: Results:  
   
Chemistry No results for input(s): GLU, NA, K, CL, CO2, BUN, CREA, CA, MG, PHOS, AGAP, BUCR, TBIL, GPT, AP, TP, ALB, GLOB, AGRAT in the last 72 hours. CBC w/Diff Recent Labs  
   09/10/18 
 1400  09/10/18 
 0430 WBC   --   3.9*  
RBC   --   3.40* HGB  8.4*  7.3* HCT  26.6*  23.0*  
PLT   --   135 GRANS   --   67  
LYMPH   --   22 EOS   --   2 Cardiac Enzymes No results found for: CPK, CK, CKMMB, CKMB, RCK3, CKMBT, CKNDX, CKND1, TYLER, TROPT, TROIQ, RUFINO, TROPT, TNIPOC, BNP, BNPP Coagulation No results for input(s): PTP, INR, APTT in the last 72 hours. No lab exists for component: INREXT Lipid Panel No results found for: CHOL, CHOLPOCT, CHOLX, CHLST, CHOLV, 160847, HDL, LDL, LDLC, DLDLP, 889566, VLDLC, VLDL, TGLX, TRIGL, TRIGP, TGLPOCT, CHHD, CHHDX  
BNP No results found for: BNP, BNPP, XBNPT Liver Enzymes No results for input(s): TP, ALB, TBIL, AP, SGOT, GPT in the last 72 hours. No lab exists for component: DBIL Digoxin Thyroid Studies Lab Results Component Value Date/Time TSH 0.68 09/07/2018 12:43 PM  
    
 
Signed By: Bernice Wade. Desiree Doherty, FITO September 12, 2018

## 2018-09-12 NOTE — PROGRESS NOTES
PROGRESS NOTE Patient: Marissa Alba MRN: [id number]  CSN: [id number] YOB: 1950  Age: 70 y.o. Sex: male DOA: 9/5/2018 LOS:  LOS: 6 days Diagnosis: L/S STENOSIS SPONDYL M48.061 M43.16   
   
Procedure: 
DECOMPRESSION L1-2 2-5, POSTEROLATERAL SPINE FUSION FLOSPINE L4-5 Events noticed since I saw him last on 09/06/18 HPI:  
C/O  back pain and  Hiccups,chlorpromazine Q 6 hrs is not helping PT participation terminated after 4 steps due to right leg radicular pain; no numbness or weakness No n/v or abdominal pain;  three loose stools today No CP or SOB on O2; no cough Mehta in place; good UOP 
 
ACTIVE MEDICAL PROBLEMS/PMH/PSH 
3-vessel coronary artery disease with chronically occluded right coronary artery. He had previous stent to the proximal circumflex artery with significant disease distal left circumflex and distal LAD. 
  
Paroxysmal atrial fibrillation Chronic OAC with xarelto HTN 
T2DM 
  
LEXISCAN NUCLEAR STRESS TEST 8/21/2018 Findings: 
the stress myocardial perfusion study shows mild decreased perfusion inferior wall and inferolateral wall, the resting study shows normal myocardial perfusion. The EKG gated study shows normal wall motion normal contractility the calculated ejection fraction is 65% 
  
Impressions: 
1 abnormal myocardial perfusion with inferolateral ischemia 
2 normal left ventricular systolic function 
  
Meniere's disease 
  
     
Past Surgical History:  
Procedure Laterality Date  HX HEART CATHETERIZATION      
  Stent  HX ORTHOPAEDIC      
  cervical sx  
  
Hospital Problems  Never Reviewed Codes Class Noted POA Normal cardiac stress test ICD-10-CM: Z13.6 ICD-9-CM: V81.2  9/9/2018 Unknown Overview Signed 9/9/2018 11:48 AM by MD Mavis Santiago 8/22/2018 normal perfusion scan EF 65% Paroxysmal A-fib (HCC) ICD-10-CM: I48.0 ICD-9-CM: 427.31  9/7/2018 Unknown Microcytic anemia ICD-10-CM: D50.9 ICD-9-CM: 280.9  9/7/2018 Unknown DM (diabetes mellitus) ([facility] ICD-10-CM: E11.9 ICD-9-CM: 250.00  9/7/2018 Unknown HTN (hypertension) ICD-10-CM: I10 
ICD-9-CM: 401.9  9/7/2018 Unknown CAD (coronary artery disease) ICD-10-CM: I25.10 ICD-9-CM: 414.00  9/7/2018 Unknown Hiccup ICD-10-CM: R06.6 ICD-9-CM: 786.8  9/7/2018 Unknown Ileus ([facility] 75.) ICD-10-CM: K56.7 ICD-9-CM: 560.1  9/7/2018 Unknown Urine retention ICD-10-CM: R33.9 ICD-9-CM: 788.20  9/7/2018 Unknown Lumbar stenosis ICD-10-CM: M48.061 
ICD-9-CM: 724.02  9/5/2018 Unknown Patient Vitals for the past 24 hrs: 
 Temp Pulse Resp BP SpO2  
09/11/18 1558 98.6 °F (37 °C) 73 18 122/69 100 % 09/11/18 1216 98.2 °F (36.8 °C) 68 18 125/73 99 % 09/11/18 0855 - 91 - 139/73 -  
09/11/18 0346 97.6 °F (36.4 °C) 89 18 118/67 95 % 09/11/18 0132 98.1 °F (36.7 °C) 80 18 133/69 97 % ROS: 
Constitutional:  No chills . No headache. Cardiac: per HPI Respiratory: per HPI 
GI: per HPI  
:  
M/S: + back pain Neuro: No numbness. No Weakness Skin: No itching PHYSICAL EXAM: 
GENERAL: some distress due hiccups and back pain HEENT:    
CONJUNCTIVA:[place] LUNGS: CTA. BS Normal Bilaterally HEART: ROS;1-3/8  Aortic systolic murmur No S3 S4 ABDOMEN: Soft. Normal BS. No tenderness. LE: No edema. SKIN: No rash. No ecchymosis. Back; dressing stained with old drainage  
  
 
Intake/Output Summary (Last 24 hours) at 09/11/18 2030 Last data filed at 09/11/18 1617 Gross per 24 hour Intake                0 ml Output             2775 ml Net            -2775 ml Current Shift:    
 
Last three shifts:  09/10 0701 - 09/11 1900 In: 340 [P.O.:240; I.V.:100] Out: 3875 [QIOIZ:5690] Recent Results (from the past 24 hour(s)) GLUCOSE, POC Collection Time: 09/10/18  9:52 PM  
Result Value Ref Range Glucose (POC) 202 (H) 70 - 110 mg/dL GLUCOSE, POC  
 Collection Time: 09/11/18  6:10 AM  
Result Value Ref Range Glucose (POC) 164 (H) 70 - 110 mg/dL OCCULT BLOOD, STOOL Collection Time: 09/11/18  7:10 AM  
Result Value Ref Range Occult blood, stool NEGATIVE  NEG    
GLUCOSE, POC Collection Time: 09/11/18 12:13 PM  
Result Value Ref Range Glucose (POC) 174 (H) 70 - 110 mg/dL Sobia Dia Collection Time: 09/11/18  4:41 PM  
Result Value Ref Range Vancomycin,trough 8.9 (L) 10.0 - 20.0 ug/mL Reported dose date: [id number] Reported dose time: 500 Reported dose: 1250MG UNITS  
GLUCOSE, POC Collection Time: 09/11/18  5:27 PM  
Result Value Ref Range Glucose (POC) 171 (H) 70 - 110 mg/dL Lab Results Component Value Date/Time Glucose 160 (H) 09/09/2018 04:15 AM  
 Glucose 132 (H) 09/08/2018 04:00 AM  
 Glucose 122 (H) 09/07/2018 03:55 AM  
 Glucose 197 (H) 12/22/2010 05:45 AM  
 Glucose 245 (H) 12/20/2010 05:45 AM  
  
Tele: SR 
 
ASSESSMENT Back pain not well controlled Hiccups Right leg radicular pain 
Paroxismal A-fib; in SR for last 24 hrs or so Xarelto restarted 72 hrs post op Antibiotics coverage for HCAP based on abnormal PCXR  finding but no definite infiltrate; spiked fever Post anemia and  Iron studies suggestive of LAYLA Rivero reinserted; Retention ? 3 vessels CAD Preserved LVEF Abnormal myocardial perfusion with inferolateral ischemia 8/21/2018 T2DM HTN 
 
PLAN Adjust analgesics Increase frequency of thorazine dose D/C Levaquin to avoid possible prolonged QT in conjunction with phenergan Adjust pain meds CXR PA/LAT Flomax 0.4 mg starting tonight D/C rivero in [address] [facility] MD Tamanna 
9/11/2018, 2:18 PM

## 2018-09-12 NOTE — PROGRESS NOTES
PROGRESS NOTE Patient: Jonathan Quach MRN: [id number]  CSN: [id number] YOB: 1950  Age: 70 y.o. Sex: male DOA: 9/5/2018 LOS:  LOS: 7 days Diagnosis: L/S STENOSIS SPONDYL M48.061 M43.16   
   
Procedure: 
DECOMPRESSION L1-2 2-5, POSTEROLATERAL SPINE FUSION FLOSPINE L4-5 
 
HPI:  
Hiccups are making him miserable and difficult to breath Suspect abdominal distention causing diaphragm irritation and limiting lung expansion No N/V or abdominal pain; Two small loose stools PT participation improving Mehta out Voiding well Back pain controlled ACTIVE MEDICAL PROBLEMS/PMH/PSH 
3-vessel coronary artery disease with chronically occluded right coronary artery. He had previous stent to the proximal circumflex artery with significant disease distal left circumflex and distal LAD. 
  
Paroxysmal atrial fibrillation Chronic OAC with xarelto HTN 
T2DM 
  
LEXISCAN NUCLEAR STRESS TEST 8/21/2018 Findings: 
the stress myocardial perfusion study shows mild decreased perfusion inferior wall and inferolateral wall, the resting study shows normal myocardial perfusion. The EKG gated study shows normal wall motion normal contractility the calculated ejection fraction is 65% 
  
Impressions: 
1 abnormal myocardial perfusion with inferolateral ischemia 
2 normal left ventricular systolic function 
  
Meniere's disease 
  
     
Past Surgical History:  
Procedure Laterality Date  HX HEART CATHETERIZATION      
  Stent  HX ORTHOPAEDIC      
  cervical sx  
  
Hospital Problems  Never Reviewed Codes Class Noted POA Normal cardiac stress test ICD-10-CM: Z13.6 ICD-9-CM: V81.2  9/9/2018 Unknown Overview Signed 9/9/2018 11:48 AM by Thai Fisher MD  
  [facility] 8/22/2018 normal perfusion scan EF 65% Paroxysmal A-fib (HCC) ICD-10-CM: I48.0 ICD-9-CM: 427.31  9/7/2018 Unknown Microcytic anemia ICD-10-CM: D50.9 ICD-9-CM: 280.9  9/7/2018 Unknown DM (diabetes mellitus) ([facility] ICD-10-CM: E11.9 ICD-9-CM: 250.00  9/7/2018 Unknown HTN (hypertension) ICD-10-CM: I10 
ICD-9-CM: 401.9  9/7/2018 Unknown CAD (coronary artery disease) ICD-10-CM: I25.10 ICD-9-CM: 414.00  9/7/2018 Unknown Hiccup ICD-10-CM: R06.6 ICD-9-CM: 786.8  9/7/2018 Unknown Ileus ([facility].) ICD-10-CM: K56.7 ICD-9-CM: 560.1  9/7/2018 Unknown Urine retention ICD-10-CM: R33.9 ICD-9-CM: 788.20  9/7/2018 Unknown Lumbar stenosis ICD-10-CM: M48.061 
ICD-9-CM: 724.02  9/5/2018 Unknown Patient Vitals for the past 24 hrs: 
 Temp Pulse Resp BP SpO2  
09/12/18 1433 98.6 °F (37 °C) 78 17 104/65 96 % 09/12/18 1140 98.6 °F (37 °C) 77 17 103/65 94 % 09/12/18 0801 (!) 100.5 °F (38.1 °C) 97 17 148/76 98 % 09/12/18 0630 99.5 °F (37.5 °C) 88 17 135/75 99 % 09/11/18 2053 99.7 °F (37.6 °C) 84 18 138/67 96 % ROS: 
Constitutional:  No chills . No headache. Cardiac: No CP Respiratory: No cough GI: HPI 
: HPI 
M/S: + back pain Neuro: No numbness. No Weakness; + right leg pain Skin: No itching PHYSICAL EXAM: 
GENERAL: some distress due hiccups and back pain HEENT:    
[place] LUNGS: CTA. BS Normal Bilaterally HEART: RRR 5-5/2  Aortic systolic murmur No S3 S4 ABDOMEN: Distended,  hypoactive BS, NT 
LE: No edema. SCD SKIN: No rash.  
  
 
Intake/Output Summary (Last 24 hours) at 09/12/18 1912 Last data filed at 09/12/18 1100 Gross per 24 hour Intake                0 ml Output             2700 ml Net            -2700 ml Current Shift:    
 
Last three shifts:  09/11 0701 - 09/12 1900 In: 0 Out: 4500 [Urine:4500] Recent Results (from the past 24 hour(s)) GLUCOSE, POC Collection Time: 09/11/18  9:35 PM  
Result Value Ref Range Glucose (POC) 136 (H) 70 - 110 mg/dL GLUCOSE, POC Collection Time: 09/12/18  6:37 AM  
Result Value Ref Range Glucose (POC) 154 (H) 70 - 110 mg/dL NT-PRO BNP Collection Time: 09/12/18  9:39 AM  
Result Value Ref Range NT pro- 0 - 900 PG/ML  
GLUCOSE, POC Collection Time: 09/12/18 12:03 PM  
Result Value Ref Range Glucose (POC) 198 (H) 70 - 110 mg/dL GLUCOSE, POC Collection Time: 09/12/18  5:25 PM  
Result Value Ref Range Glucose (POC) 218 (H) 70 - 110 mg/dL Lab Results Component Value Date/Time Glucose 160 (H) 09/09/2018 04:15 AM  
 Glucose 132 (H) 09/08/2018 04:00 AM  
 Glucose 122 (H) 09/07/2018 03:55 AM  
 Glucose 197 (H) 12/22/2010 05:45 AM  
 Glucose 245 (H) 12/20/2010 05:45 AM  
  
Tele:  
 
CXR : Low lung volumes with right basilar atelectasis ASSESSMENT Hiccups and difficult breathing Abdominal distention + hypoactive BS + small loose  suggestive of early/mild ileus Lung Hypoinflation and atelactasis CXR finding unlikely Pneumonia Paroxismal A-fib; remains in SR  
[address] with Xarelto Back pain  well controlled Right leg radicular pain-Decadron started; can worsen hiccups Post anemia and  Iron studies suggestive of LAYLA 
3 vessels CAD Preserved LVEF Abnormal myocardial perfusion with inferolateral ischemia 8/21/2018 T2DM HTN 
 
PLAN 
D/c zosyn D/C vanco 
Increase thorazine Monitor QT 
ICS Liquids Increase mobilization Fatemeh Humphrey MD 
9/12/2018, 2:18 PM

## 2018-09-12 NOTE — PROGRESS NOTES
Orthopaedics Patient without new complaints status post LAMINEC/FACETECT/FORAMIN,LUMBAR [79398] (SPINE LUMBAR POSTERIOR INTERBODY FUSION (PLIF)) LAMINEC/FACETECT/FORAMIN,EACH ADDNL [27170] SD ARTHRODESIS POSTERIOR/POSTEROLATERAL LUMBAR Jhonatan Hollow Kerney Bur [95099] SPIN BONE AUTOGR[employer] LOCAL [49729] for STENOSIS SPONDYL M48.061 M43.16 Lumbar stenosis 9/5/2018. Pt feeling better. Is voiding. Complaining of r prox anterior thigh pain with walking. Visit Vitals  /65 (BP 1 Location: Left arm, BP Patient Position: At rest)  Pulse 78  Temp 98.6 °F (37 °C)  Resp 17  Ht 5' 6\" (1.676 m)  Wt 94.3 kg (208 lb)  SpO2 96%  BMI 33.57 kg/m2 CBC w/Diff Lab Results Component Value Date/Time WBC 3.9 (L) 09/10/2018 04:30 AM  
 RBC 3.40 (L) 09/10/2018 04:30 AM  
 HCT 26.6 (L) 09/10/2018 02:00 PM  
 MCV 67.6 (L) 09/10/2018 04:30 AM  
 MCH 21.5 (L) 09/10/2018 04:30 AM  
 MCHC 31.7 09/10/2018 04:30 AM  
 RDW 16.9 (H) 09/10/2018 04:30 AM  
 Lab Results Component Value Date/Time MONOS 9 09/10/2018 04:30 AM  
 EOS 2 09/10/2018 04:30 AM  
 BASOS 0 09/10/2018 04:30 AM  
 RDW 16.9 (H) 09/10/2018 04:30 AM  
  
 
 
Physical exam:  Dressing dry. Hip flexors, quads, AT, GS intact  Bilateral Lower Extremities. Sensation sl decreased r anterior thighCalves soft and nontender. Assessment:  Status post LAMINEC/FACETECT/FORAMIN,LUMBAR [[id number]] (SPINE LUMBAR POSTERIOR INTERBODY FUSION (PLIF)) LAMINEC/FACETECT/FORAMIN,EACH ADDNL [52024] SD ARTHRODESIS POSTERIOR/POSTEROLATERAL LUMBAR Jhonatan [place] Kerney Bur [[id number]] SPIN BONE AUTOGR[employer] LOCAL [86401] for STENOSIS SPONDYL M48.061 M43.16 Lumbar stenosis 9/5/2018, Evidence of mild r upper lumbar radic. No global lower extremity paion as he had preop PLAN: decadron Mobilize. Continue P.T. Discharge Planning. Arianne Mendez MD 
September 12, 2018

## 2018-09-12 NOTE — PROGRESS NOTES
Assumed patient care. Received patient asleep. No s/s of pain/ discomfort noted. Bed is locked and in lowest position and call bell is within reach. Family at bedside. Not in acute distress.

## 2018-09-13 ENCOUNTER — APPOINTMENT (OUTPATIENT)
Dept: NON INVASIVE DIAGNOSTICS | Age: 68
DRG: 460 | End: 2018-09-13
Attending: PHYSICIAN ASSISTANT
Payer: MEDICARE

## 2018-09-13 LAB
ANION GAP SERPL CALC-SCNC: 8 MMOL/L (ref 3–18)
BASOPHILS # BLD: 0 K/UL (ref 0–0.1)
BASOPHILS NFR BLD: 0 % (ref 0–2)
BUN SERPL-MCNC: 11 MG/DL (ref 7–18)
BUN/CREAT SERPL: 10 (ref 12–20)
CALCIUM SERPL-MCNC: 7.8 MG/DL (ref 8.5–10.1)
CHLORIDE SERPL-SCNC: 103 MMOL/L (ref 100–108)
CO2 SERPL-SCNC: 28 MMOL/L (ref 21–32)
CREAT SERPL-MCNC: 1.12 MG/DL (ref 0.6–1.3)
DATE LAST DOSE: NORMAL
DIFFERENTIAL METHOD BLD: ABNORMAL
ECHO AV AREA PEAK VELOCITY: -14.6 CM2
ECHO AV AREA/BSA PEAK VELOCITY: -7 CM2/M2
ECHO AV MEAN GRADIENT: 8 MMHG
ECHO AV PEAK GRADIENT: 0.7 MMHG
ECHO AV PEAK VELOCITY: -42.46 CM/S
ECHO AV REGURGITANT PHT: 1841.2 CM
ECHO AV VTI: 43.45 CM
ECHO LA VOL 4C: 32.56 ML (ref 18–58)
ECHO LA VOLUME INDEX A4C: 16.01 ML/M2
ECHO LV EDV A4C: 111.9 ML
ECHO LV EDV INDEX A4C: 55 ML/M2
ECHO LV EJECTION FRACTION A4C: 61 %
ECHO LV ESV A4C: 43.9 ML
ECHO LV ESV INDEX A4C: 21.6 ML/M2
ECHO LV INTERNAL DIMENSION DIASTOLIC: 4.84 CM (ref 4.2–5.9)
ECHO LV INTERNAL DIMENSION SYSTOLIC: 2.39 CM
ECHO LV IVSD: 1.28 CM (ref 0.6–1)
ECHO LV MASS 2D: 278.4 G (ref 88–224)
ECHO LV MASS INDEX 2D: 136.9 G/M2
ECHO LV POSTERIOR WALL DIASTOLIC: 1.21 CM (ref 0.6–1)
ECHO LVOT DIAM: 2.48 CM
ECHO LVOT PEAK GRADIENT: 6.6 MMHG
ECHO LVOT PEAK VELOCITY: 128.26 CM/S
ECHO TV REGURGITANT MAX VELOCITY: -79.89 CM/S
ECHO TV REGURGITANT PEAK GRADIENT: 2.6 MMHG
EOSINOPHIL # BLD: 0 K/UL (ref 0–0.4)
EOSINOPHIL NFR BLD: 0 % (ref 0–5)
ERYTHROCYTE [DISTWIDTH] IN BLOOD BY AUTOMATED COUNT: 17.6 % (ref 11.6–14.5)
GLUCOSE BLD STRIP.AUTO-MCNC: 225 MG/DL (ref 70–110)
GLUCOSE BLD STRIP.AUTO-MCNC: 264 MG/DL (ref 70–110)
GLUCOSE BLD STRIP.AUTO-MCNC: 268 MG/DL (ref 70–110)
GLUCOSE BLD STRIP.AUTO-MCNC: 304 MG/DL (ref 70–110)
GLUCOSE SERPL-MCNC: 209 MG/DL (ref 74–99)
HCT VFR BLD AUTO: 26.3 % (ref 36–48)
HGB BLD-MCNC: 8.2 G/DL (ref 13–16)
LYMPHOCYTES # BLD: 0.7 K/UL (ref 0.9–3.6)
LYMPHOCYTES NFR BLD: 8 % (ref 21–52)
MCH RBC QN AUTO: 21.2 PG (ref 24–34)
MCHC RBC AUTO-ENTMCNC: 31.2 G/DL (ref 31–37)
MCV RBC AUTO: 68.1 FL (ref 74–97)
MONOCYTES # BLD: 0.1 K/UL (ref 0.05–1.2)
MONOCYTES NFR BLD: 2 % (ref 3–10)
NEUTS SEG # BLD: 7 K/UL (ref 1.8–8)
NEUTS SEG NFR BLD: 90 % (ref 40–73)
PISA AR MAX VEL: 219.71 CM/S
PLATELET # BLD AUTO: 261 K/UL (ref 135–420)
PMV BLD AUTO: 9.7 FL (ref 9.2–11.8)
POTASSIUM SERPL-SCNC: 4.1 MMOL/L (ref 3.5–5.5)
RBC # BLD AUTO: 3.86 M/UL (ref 4.7–5.5)
REPORTED DOSE,DOSE: NORMAL UNITS
REPORTED DOSE/TIME,TMG: 2000
SODIUM SERPL-SCNC: 139 MMOL/L (ref 136–145)
VANCOMYCIN TROUGH SERPL-MCNC: 13.1 UG/ML (ref 10–20)
WBC # BLD AUTO: 7.8 K/UL (ref 4.6–13.2)

## 2018-09-13 PROCEDURE — 74011636637 HC RX REV CODE- 636/637: Performed by: INTERNAL MEDICINE

## 2018-09-13 PROCEDURE — 65660000000 HC RM CCU STEPDOWN

## 2018-09-13 PROCEDURE — 74011250636 HC RX REV CODE- 250/636: Performed by: INTERNAL MEDICINE

## 2018-09-13 PROCEDURE — 85025 COMPLETE CBC W/AUTO DIFF WBC: CPT | Performed by: ORTHOPAEDIC SURGERY

## 2018-09-13 PROCEDURE — 97530 THERAPEUTIC ACTIVITIES: CPT

## 2018-09-13 PROCEDURE — 36415 COLL VENOUS BLD VENIPUNCTURE: CPT | Performed by: ORTHOPAEDIC SURGERY

## 2018-09-13 PROCEDURE — 74011636637 HC RX REV CODE- 636/637: Performed by: ORTHOPAEDIC SURGERY

## 2018-09-13 PROCEDURE — 93321 DOPPLER ECHO F-UP/LMTD STD: CPT

## 2018-09-13 PROCEDURE — 74011250636 HC RX REV CODE- 250/636: Performed by: ORTHOPAEDIC SURGERY

## 2018-09-13 PROCEDURE — 77030037878 HC DRSG MEPILEX >48IN BORD MOLN -B

## 2018-09-13 PROCEDURE — 80202 ASSAY OF VANCOMYCIN: CPT | Performed by: ORTHOPAEDIC SURGERY

## 2018-09-13 PROCEDURE — 80048 BASIC METABOLIC PNL TOTAL CA: CPT | Performed by: ORTHOPAEDIC SURGERY

## 2018-09-13 PROCEDURE — 74011250637 HC RX REV CODE- 250/637: Performed by: INTERNAL MEDICINE

## 2018-09-13 PROCEDURE — 82962 GLUCOSE BLOOD TEST: CPT

## 2018-09-13 RX ORDER — INSULIN GLARGINE 100 [IU]/ML
5 INJECTION, SOLUTION SUBCUTANEOUS ONCE
Status: DISCONTINUED | OUTPATIENT
Start: 2018-09-13 | End: 2018-09-13 | Stop reason: SDUPTHER

## 2018-09-13 RX ORDER — INSULIN GLARGINE 100 [IU]/ML
15 INJECTION, SOLUTION SUBCUTANEOUS DAILY
Status: DISCONTINUED | OUTPATIENT
Start: 2018-09-14 | End: 2018-09-14

## 2018-09-13 RX ORDER — INSULIN GLARGINE 100 [IU]/ML
5 INJECTION, SOLUTION SUBCUTANEOUS ONCE
Status: COMPLETED | OUTPATIENT
Start: 2018-09-13 | End: 2018-09-13

## 2018-09-13 RX ADMIN — RANOLAZINE 500 MG: 500 TABLET, FILM COATED, EXTENDED RELEASE ORAL at 08:23

## 2018-09-13 RX ADMIN — Medication 10 ML: at 16:03

## 2018-09-13 RX ADMIN — INSULIN LISPRO 4 UNITS: 100 INJECTION, SOLUTION INTRAVENOUS; SUBCUTANEOUS at 08:23

## 2018-09-13 RX ADMIN — TAMSULOSIN HYDROCHLORIDE 0.4 MG: 0.4 CAPSULE ORAL at 08:26

## 2018-09-13 RX ADMIN — DEXAMETHASONE SODIUM PHOSPHATE 4 MG: 4 INJECTION, SOLUTION INTRAMUSCULAR; INTRAVENOUS at 08:23

## 2018-09-13 RX ADMIN — CHLORPROMAZINE HYDROCHLORIDE 25 MG: 25 INJECTION INTRAMUSCULAR at 03:44

## 2018-09-13 RX ADMIN — RIVAROXABAN 20 MG: 20 TABLET, FILM COATED ORAL at 17:25

## 2018-09-13 RX ADMIN — INSULIN GLARGINE 5 UNITS: 100 INJECTION, SOLUTION SUBCUTANEOUS at 12:27

## 2018-09-13 RX ADMIN — INSULIN GLARGINE 10 UNITS: 100 INJECTION, SOLUTION SUBCUTANEOUS at 08:22

## 2018-09-13 RX ADMIN — RANOLAZINE 500 MG: 500 TABLET, FILM COATED, EXTENDED RELEASE ORAL at 17:25

## 2018-09-13 RX ADMIN — CHLORPROMAZINE HYDROCHLORIDE 25 MG: 25 INJECTION INTRAMUSCULAR at 11:58

## 2018-09-13 RX ADMIN — INSULIN LISPRO 6 UNITS: 100 INJECTION, SOLUTION INTRAVENOUS; SUBCUTANEOUS at 17:26

## 2018-09-13 RX ADMIN — CHLORPROMAZINE HYDROCHLORIDE 25 MG: 25 INJECTION INTRAMUSCULAR at 16:08

## 2018-09-13 RX ADMIN — CHLORPROMAZINE HYDROCHLORIDE 25 MG: 25 INJECTION INTRAMUSCULAR at 21:02

## 2018-09-13 RX ADMIN — METOPROLOL TARTRATE 100 MG: 50 TABLET ORAL at 08:24

## 2018-09-13 RX ADMIN — HYDROCODONE BITARTRATE AND ACETAMINOPHEN 2 TABLET: 5; 325 TABLET ORAL at 23:50

## 2018-09-13 RX ADMIN — DILTIAZEM HYDROCHLORIDE 240 MG: 240 CAPSULE, COATED, EXTENDED RELEASE ORAL at 08:24

## 2018-09-13 RX ADMIN — INSULIN LISPRO 8 UNITS: 100 INJECTION, SOLUTION INTRAVENOUS; SUBCUTANEOUS at 22:07

## 2018-09-13 RX ADMIN — INSULIN LISPRO 6 UNITS: 100 INJECTION, SOLUTION INTRAVENOUS; SUBCUTANEOUS at 12:26

## 2018-09-13 RX ADMIN — DEXAMETHASONE SODIUM PHOSPHATE 4 MG: 4 INJECTION, SOLUTION INTRAMUSCULAR; INTRAVENOUS at 13:49

## 2018-09-13 RX ADMIN — METOPROLOL TARTRATE 100 MG: 50 TABLET ORAL at 20:35

## 2018-09-13 RX ADMIN — DEXAMETHASONE SODIUM PHOSPHATE 4 MG: 4 INJECTION, SOLUTION INTRAMUSCULAR; INTRAVENOUS at 20:35

## 2018-09-13 RX ADMIN — Medication 10 ML: at 22:07

## 2018-09-13 RX ADMIN — PANTOPRAZOLE SODIUM 40 MG: 40 TABLET, DELAYED RELEASE ORAL at 08:25

## 2018-09-13 RX ADMIN — ASPIRIN 81 MG: 81 TABLET, COATED ORAL at 08:26

## 2018-09-13 RX ADMIN — HYDROCODONE BITARTRATE AND ACETAMINOPHEN 1 TABLET: 5; 325 TABLET ORAL at 11:58

## 2018-09-13 RX ADMIN — Medication 10 ML: at 05:30

## 2018-09-13 RX ADMIN — DEXAMETHASONE SODIUM PHOSPHATE 4 MG: 4 INJECTION, SOLUTION INTRAMUSCULAR; INTRAVENOUS at 01:28

## 2018-09-13 NOTE — PROGRESS NOTES
Assumed care; Pt resting in bed; no distress noted; Pt denies pain; bed in low position; Side rails up x 3; Call light and personal belonging within reach. Will continue to monitor. 2130: Urinal emptied. Noted small blood clot in urinal. Pt had a rivero that was removed during the am shift. No distress noted.

## 2018-09-13 NOTE — PROGRESS NOTES
[facility] - Progress Note Admit Date: 9/5/2018 I saw, evaluated, interviewed and examined the patient personally. I agree with the findings and plan of care as documented below with PAMARIELENA note Feeling much better today. No more hiccups today Dyspnea almost resolved Good diuresis with one time lasix ECHO images reviewed, EF normal. Mild AS Continue current cardiac medications. Chong Kearns MD 
 
 
 
 
Assessment:  
 
- Status post lumbar spinal surgery 09/05/18 
- CAD: cardiac catheterization 09/2011 demonstrated totally occluded RCA and severe distal LAD and CCx disease. 
- Fever, possible infiltrate on CXR 09/8/18 
- Post op ileus 
- Nuclear stress test with inferolateral ischemia 08/21/18, normal LVEF 
- Paroxysmal afib, on Xarelto - Post op anemia 
- Hiccups - Diabetes Mellitus 
- HTN Plan:  
 
- Patient showing improvement this AM, no hiccups at the time of assessment. Primary team increased dose of Thorazine, suspected secondary to ileus.  
- BNP negative, limited echo done today. Good response to diuretic yesterday. - Has been in sinus on telemetry, continue with PO Lopressor.  
- Xarelto for stroke prophylaxis Subjective: No new complaints. Hiccups are much improved. No CP or shortness of breath. Objective:  
  
Patient Vitals for the past 8 hrs: 
 Temp Pulse Resp BP SpO2  
09/13/18 0744 97.3 °F (36.3 °C) 71 17 120/73 96 % 09/13/18 0354 97.6 °F (36.4 °C) 72 17 112/74 97 % Patient Vitals for the past 96 hrs: 
 Weight  
09/10/18 0000 208 lb (94.3 kg) Intake/Output Summary (Last 24 hours) at 09/13/18 9095 Last data filed at 09/13/18 3477 Gross per 24 hour Intake                0 ml Output             2000 ml Net            -2000 ml Physical Exam: 
General:  alert, cooperative, no distress Neck:  nontender, no JVD Lungs:  clear to auscultation bilaterally Heart:  regular rate and rhythm, S1, S2 normal, no murmur, click, rub or gallop Abdomen:  abdomen is soft without significant tenderness, masses, organomegaly or guarding Extremities:  extremities normal, atraumatic, no cyanosis or edema Data Review:  
 
Labs: Results:  
   
Chemistry Recent Labs  
   09/13/18 
 0300 GLU  209* NA  139  
K  4.1 CL  103 CO2  28 BUN  11  
CREA  1.12  
CA  7.8* AGAP  8  
BUCR  10* CBC w/Diff Recent Labs  
   09/13/18 
 0300  09/10/18 
 1400 WBC  7.8   --   
RBC  3.86*   --   
HGB  8.2*  8.4* HCT  26.3*  26.6*  
PLT  261   --   
GRANS  90*   --   
LYMPH  8*   --   
EOS  0   --   
  
Cardiac Enzymes No results found for: CPK, CK, CKMMB, CKMB, RCK3, CKMBT, CKNDX, CKND1, TYLER, TROPT, TROIQ, RUFINO, TROPT, TNIPOC, BNP, BNPP Coagulation No results for input(s): PTP, INR, APTT in the last 72 hours. No lab exists for component: INREXT Lipid Panel No results found for: CHOL, CHOLPOCT, CHOLX, CHLST, CHOLV, 928577, HDL, LDL, LDLC, DLDLP, 053271, VLDLC, VLDL, TGLX, TRIGL, TRIGP, TGLPOCT, CHHD, CHHDX  
BNP No results found for: BNP, BNPP, XBNPT Liver Enzymes No results for input(s): TP, ALB, TBIL, AP, SGOT, GPT in the last 72 hours. No lab exists for component: DBIL Digoxin Thyroid Studies Lab Results Component Value Date/Time TSH 0.68 09/07/2018 12:43 PM  
    
 
Signed By: Lisa Fortune. Shagufta Onofre PA-C September 13, 2018

## 2018-09-13 NOTE — PROGRESS NOTES
Ortho rounds complete with Dr. Jorden Rogers and Alex KAHN, all questions answered. Primary nurse at bedside with assistance. Continue to encourage PT/OT, ICS use and OOB with assistance. All questions answered, for discharge once medically cleared.

## 2018-09-13 NOTE — PROGRESS NOTES
Attempted PT, pt became agitated and stated he needed to be cleaned up. Attempted to assist pt without success. Evaluating therapist will continue to follow with patient.   Kaitlyn Amezquita, PTA

## 2018-09-13 NOTE — PROGRESS NOTES
Problem: Mobility Impaired (Adult and Pediatric) Goal: *Acute Goals and Plan of Care (Insert Text) Physical Therapy Goals: CONTINUE GOALS Initiated 9/5/2018 and to be accomplished within 7 days. 1.  Patient will complete all bed mobility with minimal assistance/contact guard assist in order to prepare for EOB/OOB activity. 2.  Patient will perform sit <> stand with supervision/set-up in order to prepare for OOB/gait activity. 3.  Patient will perform bed to chair transfers with minimal assistance/contact guard assist in order to promote mobility and encourage seated activity to progress towards their prior level of function. 4.  Patient will ambulate 50 feet with supervision/set-up using LRAD in order to prepare for safe negotiation of their environment. Outcome: Progressing Towards Goal 
 
PHYSICAL THERAPY: Daily TREATMENT Note INPATIENT: Medicare: Hospital Day: 9 Patient: Kodak Farmer (73 y.o. male)    Date: 9/13/2018 Primary Diagnosis: STENOSIS SPONDYL M48.061 M43.16 Lumbar stenosis Procedure(s) (LRB): 
DECOMPRESSION L1-2 2-5, POSTEROLATERAL SPINE FUSION FLOSPINE L4-5 (N/A), 8 Days Post-Op, Precautions: Spinal 
 
Chart, physical therapy assessment, plan of care and goals were reviewed. PLOF:Independent ASSESSMENT: 
Patient supine in bed, agreeable to participation with PT. MOD A x 2 for supine <> sit. MIN A x 2 for sit <> stand. Patient able to ambulate 20 ft in room environment using RW and CGA. Patient left sitting in recliner with brace on and all needs within reach. Patient demo's improved activity tolerance with mobility. No c/o sciatic pain during session. Progression toward goals: 
      Improving slowly and progressing toward goals PLAN: 
Patient continues to benefit from skilled intervention to address the above impairments. Continue treatment per established plan of care.  
 
EDUCATION:  
Education:  Patient was educated on the following topics: safety with mobility, strategy for transfer to recliner. Verbalizes understanding. Barriers to Learning/Limitations: None Compensate with: visual, verbal, tactile, kinesthetic cues/model Discharge Recommendations:  Rehab Further Equipment Recommendations for Discharge:  rolling walker Factors which may impact discharge planning: N/A  
 
SUBJECTIVE:  
Patient stated I am better today.  OBJECTIVE DATA SUMMARY:  
Critical Behavior: 
Neurologic State: Alert Orientation Level: Oriented X4 Cognition: Follows commands Safety/Judgement: Fall prevention G CODE:Mobility [id number] Current  CL= 60-79%   Goal  CL= 60-79%. The severity rating is based on the Other [facility] Balance Scale 2/5 [address] Standing Balance Scale 2/5 
0: Pt performs 25% or less of standing activity (Max assist) CN, 100% impaired. 1: Pt supports self with upper extremities but requires therapist assistance. Pt performs 25-50% of effort (Mod assist) CM, 80% to <100% impaired. 1+: Pt supports self with upper extremities but requires therapist assistance. Pt performs >50% effort. (Min assist). CL, 60% to <80% impaired. 2: Pt supports self independently with both upper extremities (walker, crutches, parallel bars). CL, 60% to <80% impaired. 2+: Pt support self independently with 1 upper extremity (cane, crutch, 1 parallel bar). CK, 40% to <60% impaired. 3: Pt stands without upper extremity support for up to 30 seconds. CK, 40% to <60% impaired. 3+: Pt stands without upper extremity support for 30 seconds or greater. CJ, 20% to <40% impaired. 4: Pt independently moves and returns center of gravity 1-2 inches in one plane. CJ, 20% to <40% impaired. 4+: Pt independently moves and returns center of gravity 1-2 inches in multiple planes. CI, 1% to <20% impaired. 5: Pt independently moves and returns center of gravity in all planes greater than 2 inches. CH, 0% impaired. Functional Mobility: Functional Status Indep (I) Mod I Super-vision Min A Mod A Max A Total A Assist x2 Verbal cues Additional time Not tested Comments Rolling []  []  [] []    []    []  []  [] [] [] [x] Supine to sit []  []  [] []  [x]  []  []  [x] [] [] [] Sit to supine []  []  [] []  []  []  []  [] [] [] [x] Sit to stand []  []  [] [x]  []  []  []  [x] [] [] []   
Stand to sit []  []  [] [x]  []  []  []  [] [] [] [] Bed to chair transfers []  []  [] []  []  []  []  [] [] [] [x] Balance Good Junella Fix Poor Unable Not tested Comments Sitting static [x]  []  []  []  [] Sitting dynamic [x]  []  []  []  []   
Standing static []  [x]  []  []  []   
Standing dynamic []  [x]  []  []  [] Mobility/Gait:  
Level of Assistance: Contact guard assistance Assistive Device: rolling walker Distance Ambulated: 20 feet Left Lower Extremity: FWB Right Lower Extremity: FWB Base of Support: center of gravity altered Speed/Elena: [facility] Step Length: [facility] Swing Pattern: [facility] Stance: [facility] Gait Abnormalities: altered arm swing Vital Signs Temp: 97.5 °F (36.4 °C) Pulse (Heart Rate): 80    
BP: 124/71 Resp Rate: 16    
O2 Sat (%): 97 % Pain: None Activity Tolerance:  
Good After treatment:  
Patient left in no apparent distress sitting up in chair Call bell left within reach Nursing notified Juvencio Kelly Time Calculation: 28 mins

## 2018-09-13 NOTE — PROGRESS NOTES
PROGRESS NOTE Patient: Va Tidwell MRN: [id number]  CSN: [id number] YOB: 1950  Age: 70 y.o. Sex: male DOA: 9/5/2018 LOS:  LOS: 8 days Diagnosis: L/S STENOSIS SPONDYL M48.061 M43.16   
   
Procedure: 
DECOMPRESSION L1-2 2-5, POSTEROLATERAL SPINE FUSION FLOSPINE L4-5 
 
HPI:  
Hiccups much better ; no breathing issues; using ICS No N/V or abdominal pain Sitting up in chair Two small loose stools PT participation improving Voiding well Back pain controlled No ight leg radicular pain ACTIVE MEDICAL PROBLEMS/PMH/PSH 
3-vessel coronary artery disease with chronically occluded right coronary artery. He had previous stent to the proximal circumflex artery with significant disease distal left circumflex and distal LAD. 
  
Paroxysmal atrial fibrillation Chronic OAC with xarelto HTN 
T2DM 
  
LEXISCAN NUCLEAR STRESS TEST 8/21/2018 Findings: 
the stress myocardial perfusion study shows mild decreased perfusion inferior wall and inferolateral wall, the resting study shows normal myocardial perfusion. The EKG gated study shows normal wall motion normal contractility the calculated ejection fraction is 65% 
  
Impressions: 
1 abnormal myocardial perfusion with inferolateral ischemia 
2 normal left ventricular systolic function 
  
Meniere's disease 
  
     
Past Surgical History:  
Procedure Laterality Date  HX HEART CATHETERIZATION      
  Stent  HX ORTHOPAEDIC      
  cervical sx  
  
Hospital Problems  Never Reviewed Codes Class Noted POA Normal cardiac stress test ICD-10-CM: Z13.6 ICD-9-CM: V81.2  9/9/2018 Unknown Overview Signed 9/9/2018 11:48 AM by MD Chris Rubio 8/22/2018 normal perfusion scan EF 65% Paroxysmal A-fib (HCC) ICD-10-CM: I48.0 ICD-9-CM: 427.31  9/7/2018 Unknown Microcytic anemia ICD-10-CM: D50.9 ICD-9-CM: 280.9  9/7/2018 Unknown DM (diabetes mellitus) ([facility] ICD-10-CM: E11.9 ICD-9-CM: 250.00  9/7/2018 Unknown HTN (hypertension) ICD-10-CM: I10 
ICD-9-CM: 401.9  9/7/2018 Unknown CAD (coronary artery disease) ICD-10-CM: I25.10 ICD-9-CM: 414.00  9/7/2018 Unknown Hiccup ICD-10-CM: R06.6 ICD-9-CM: 786.8  9/7/2018 Unknown Ileus ([facility] ICD-10-CM: K56.7 ICD-9-CM: 560.1  9/7/2018 Unknown Urine retention ICD-10-CM: R33.9 ICD-9-CM: 788.20  9/7/2018 Unknown Lumbar stenosis ICD-10-CM: M48.061 
ICD-9-CM: 724.02  9/5/2018 Unknown Patient Vitals for the past 24 hrs: 
 Temp Pulse Resp BP SpO2  
09/13/18 1511 97.4 °F (36.3 °C) 65 16 132/63 96 % 09/13/18 1118 97.5 °F (36.4 °C) 80 16 124/71 97 % 09/13/18 0945 - - - 120/73 -  
09/13/18 0744 97.3 °F (36.3 °C) 71 17 120/73 96 % 09/13/18 0354 97.6 °F (36.4 °C) 72 17 112/74 97 % 09/13/18 0035 97.3 °F (36.3 °C) 65 17 113/63 98 % 09/12/18 2040 99 °F (37.2 °C) 77 17 147/67 98 % ROS: 
Constitutional:  No chills . No headache. Cardiac: No CP Respiratory: No cough GI: HPI 
: HPI 
M/S: + back pain Neuro: No numbness. No Weakness; + right leg pain Skin: No itching PHYSICAL EXAM: 
GENERAL: Looks and feels much better LUNGS: CTA. BS Normal Bilaterally HEART: RRR 1-2/4  Aortic systolic murmur No S3 S4 ABDOMEN: less distended; NL BS 
LE: No edema 
 
  
 
Intake/Output Summary (Last 24 hours) at 09/13/18 1629 Last data filed at 09/13/18 1120 Gross per 24 hour Intake                0 ml Output             1850 ml Net            -1850 ml  
 
 
Current Shift:  09/13 0701 - 09/13 1900 In: 0 Out: 450 [Urine:450] Last three shifts:  09/11 1901 - 09/13 0700 In: -  
Out: [address] Recent Results (from the past 24 hour(s)) GLUCOSE, POC Collection Time: 09/12/18  5:25 PM  
Result Value Ref Range Glucose (POC) 218 (H) 70 - 110 mg/dL GLUCOSE, POC Collection Time: 09/12/18  9:01 PM  
Result Value Ref Range Glucose (POC) 146 (H) 70 - 110 mg/dL [facility] Rear Collection Time: 09/13/18  3:00 AM  
Result Value Ref Range Vancomycin,trough 13.1 10.0 - 20.0 ug/mL Reported dose date: 19123146 Reported dose time: 2000 Reported dose: 1000MG UNITS METABOLIC PANEL, BASIC Collection Time: 09/13/18  3:00 AM  
Result Value Ref Range Sodium 139 136 - 145 mmol/L Potassium 4.1 3.5 - 5.5 mmol/L Chloride 103 100 - 108 mmol/L  
 CO2 28 21 - 32 mmol/L Anion gap 8 3.0 - 18 mmol/L Glucose 209 (H) 74 - 99 mg/dL BUN 11 7.0 - 18 MG/DL Creatinine 1.12 0.6 - 1.3 MG/DL  
 BUN/Creatinine ratio 10 (L) 12 - 20 GFR est AA >60 >60 ml/min/1.73m2 GFR est non-AA >60 >60 ml/min/1.73m2 Calcium 7.8 (L) 8.5 - 10.1 MG/DL  
CBC WITH AUTOMATED DIFF Collection Time: 09/13/18  3:00 AM  
Result Value Ref Range WBC 7.8 4.6 - 13.2 K/uL  
 RBC 3.86 (L) 4.70 - 5.50 M/uL HGB 8.2 (L) 13.0 - 16.0 g/dL HCT 26.3 (L) 36.0 - 48.0 % MCV 68.1 (L) 74.0 - 97.0 FL  
 MCH 21.2 (L) 24.0 - 34.0 PG  
 MCHC 31.2 31.0 - 37.0 g/dL  
 RDW 17.6 (H) 11.6 - 14.5 % PLATELET 199 030 - 520 K/uL MPV 9.7 9.2 - 11.8 FL  
 NEUTROPHILS 90 (H) 40 - 73 % LYMPHOCYTES 8 (L) 21 - 52 % MONOCYTES 2 (L) 3 - 10 % EOSINOPHILS 0 0 - 5 % BASOPHILS 0 0 - 2 %  
 ABS. NEUTROPHILS 7.0 1.8 - 8.0 K/UL  
 ABS. LYMPHOCYTES 0.7 (L) 0.9 - 3.6 K/UL  
 ABS. MONOCYTES 0.1 0.05 - 1.2 K/UL  
 ABS. EOSINOPHILS 0.0 0.0 - 0.4 K/UL  
 ABS. BASOPHILS 0.0 0.0 - 0.1 K/UL  
 DF AUTOMATED    
GLUCOSE, POC Collection Time: 09/13/18  5:51 AM  
Result Value Ref Range Glucose (POC) 225 (H) 70 - 110 mg/dL ECHO ADULT FOLLOW-UP OR LIMITED Collection Time: 09/13/18  9:47 AM  
Result Value Ref Range Aortic Valve Systolic Peak Velocity -85.43 cm/s AoV VTI 43.45 cm Aortic Valve Area by Continuity of Peak Velocity -14.6 cm2 AoV PG 0.7 mmHg LVIDd 4.84 4.2 - 5.9 cm  
 LVPWd 1.21 (A) 0.6 - 1.0 cm LVIDs 2.39 cm IVSd 1.28 (A) 0.6 - 1.0 cm  
 LV ES Vol A4C 43.9 mL  
 LVOT d 2.48 cm  
 LVOT Peak Velocity 128.26 cm/s LVOT Peak Gradient 6.6 mmHg Aortic Valve Systolic Mean Gradient 8.0 mmHg LV Ejection Fraction MOD 4C 61 % LA Vol 4C 32.56 18 - 58 mL  
 LV Mass .4 (A) 88 - 224 g LV Mass AL Index 136.9 g/m2 LV ED Vol A4C 111.9 mL Triscuspid Valve Regurgitation Peak Gradient 2.6 mmHg Aortic Regurgitant Pressure Half-time 1841.2 cm  
 TR Max Velocity -79.89 cm/s  
 LA Vol Index 16.01 ml/m2 LVED Vol Index A4C 55.0 mL/m2 LVES Vol Index A4C 21.6 mL/m2 ABIDA/BSA Pk Alex -7.0 cm2/m2 AR Max Alex 219.71 cm/s GLUCOSE, POC Collection Time: 09/13/18 12:11 PM  
Result Value Ref Range Glucose (POC) 264 (H) 70 - 110 mg/dL Lab Results Component Value Date/Time Glucose 209 (H) 09/13/2018 03:00 AM  
 Glucose 160 (H) 09/09/2018 04:15 AM  
 Glucose 132 (H) 09/08/2018 04:00 AM  
 Glucose 122 (H) 09/07/2018 03:55 AM  
 Glucose 197 (H) 12/22/2010 05:45 AM  
  
Tele: SR; PVC, QT interval 0.40 CXR : Low lung volumes with right basilar atelectasis ASSESSMENT Hiccups much better Abdominal distention decreasing ; nl BS; small loose- suggestive of partial ileus resolving Lung Hypoinflation and atelectasis-ICS Paroxismal A-fib; remains in SR  
[address] with Xarelto Back pain  well controlled Right leg radicular pain better on decadron Post anemia and  Iron studies suggestive of LAYLA 
3 vessels CAD Preserved LVEF Abnormal myocardial perfusion with inferolateral ischemia 8/21/2018 T2DM HTN 
 
PLAN Continue same meds Increase mobilization Advance diet tomorrow Kat Alex MD 
9/13/2018, 2:18 PM

## 2018-09-13 NOTE — PROGRESS NOTES
Problem: Falls - Risk of 
Goal: *Absence of Falls Document Concepcion Messer Fall Risk and appropriate interventions in the flowsheet. Outcome: Progressing Towards Goal 
Fall Risk Interventions: 
Mobility Interventions: Patient to call before getting OOB Mentation Interventions: Door open when patient unattended Medication Interventions: Patient to call before getting OOB Elimination Interventions: Call light in reach History of Falls Interventions: Door open when patient unattended

## 2018-09-13 NOTE — PROGRESS NOTES
Bedside report given by [facility], RN. Pt in bed with HOB elevated, bed locked at lowest position, call bell in reach. Pt is A&OX4 and has purposeful movement in all extremities. Pt IV sites to RFA and LFA is c/d/i, no swelling, erythema or infiltration noted at this time. Pt dressing to back is c/d/i. Pt has no pain at this time. Pt shows no signs of distress at this time. Will continue to monitor. 1100 - Physical therapy working with patient 1130 Pt sitting up in chair. 1750 - No change during shift. 1859 - Bedside and Verbal shift change report given to Shante (oncoming nurse) by Diane Hamlin (offgoing nurse). Report included the following information SBAR, Kardex and MAR.

## 2018-09-13 NOTE — PROGRESS NOTES
completed a follow up visit with patient and a Spiritual assessment of patient was done. Patient in good spirits. Chaplains will continue to follow and will provide pastoral care on an as needed/requested basis [facility] 3 Board Certified [facility]  
[phone]

## 2018-09-13 NOTE — PROGRESS NOTES
Orthopaedics Patient without new complaints status post LAMINEC/FACETECT/FORAMIN,LUMBAR [85265] (SPINE LUMBAR POSTERIOR INTERBODY FUSION (PLIF)) LAMINEC/FACETECT/FORAMIN,EACH ADDNL [46147] UT ARTHRODESIS POSTERIOR/POSTEROLATERAL LUMBAR Óscar Stew [facility] [place] [[id number]] [facility] [employer] [facility] [[id number]] for STENOSIS SPONDYL M48.061 M43.16 Lumbar stenosis 9/5/2018. Refused therapy this am; will request return this pm; no hiccups. Visit Vitals  /71  Pulse 80  Temp 97.5 °F (36.4 °C)  Resp 16  
 Ht 5' 6\" (1.676 m)  Wt 94.3 kg (208 lb)  SpO2 97%  BMI 33.57 kg/m2 CBC w/Diff Lab Results Component Value Date/Time WBC 7.8 09/13/2018 03:00 AM  
 RBC 3.86 (L) 09/13/2018 03:00 AM  
 HCT 26.3 (L) 09/13/2018 03:00 AM  
 MCV 68.1 (L) 09/13/2018 03:00 AM  
 MCH 21.2 (L) 09/13/2018 03:00 AM  
 MCHC 31.2 09/13/2018 03:00 AM  
 RDW 17.6 (H) 09/13/2018 03:00 AM  
 Lab Results Component Value Date/Time MONOS 2 (L) 09/13/2018 03:00 AM  
 EOS 0 09/13/2018 03:00 AM  
 BASOS 0 09/13/2018 03:00 AM  
 RDW 17.6 (H) 09/13/2018 03:00 AM  
  
 
 
Physical exam:  Dressing dry. NVI Bilateral Lower Extremities. Calves soft and nontender. Assessment:  Status post LAMINEC/FACETECT/FORAMIN,LUMBAR [13403] (SPINE LUMBAR POSTERIOR INTERBODY FUSION (PLIF)) LAMINEC/FACETECT/FORAMIN,EACH ADDNL [04683] UT ARTHRODESIS POSTERIOR/POSTEROLATERAL LUMBAR Óscar Stew S[facility] [place] [[id number]] [facility] [facility] [[id number]] for STENOSIS SPONDYL M48.061 M43.16 Lumbar stenosis 9/5/2018,  Progressing--PT to return; anticipate TCCU here when bed available/approved. PLAN:  Mobilize. Continue P.T. Discharge Planning. Riki Padilla MD 
September 13, 2018

## 2018-09-14 ENCOUNTER — HOSPITAL ENCOUNTER (INPATIENT)
Age: 68
LOS: 20 days | Discharge: HOME HEALTH | End: 2018-10-04
Attending: INTERNAL MEDICINE | Admitting: INTERNAL MEDICINE

## 2018-09-14 VITALS
RESPIRATION RATE: 16 BRPM | DIASTOLIC BLOOD PRESSURE: 65 MMHG | HEART RATE: 70 BPM | WEIGHT: 208 LBS | HEIGHT: 66 IN | BODY MASS INDEX: 33.43 KG/M2 | TEMPERATURE: 97.3 F | SYSTOLIC BLOOD PRESSURE: 106 MMHG | OXYGEN SATURATION: 95 %

## 2018-09-14 LAB
GLUCOSE BLD STRIP.AUTO-MCNC: 228 MG/DL (ref 70–110)
GLUCOSE BLD STRIP.AUTO-MCNC: 280 MG/DL (ref 70–110)

## 2018-09-14 PROCEDURE — 97116 GAIT TRAINING THERAPY: CPT

## 2018-09-14 PROCEDURE — 74011250636 HC RX REV CODE- 250/636: Performed by: INTERNAL MEDICINE

## 2018-09-14 PROCEDURE — 74011250637 HC RX REV CODE- 250/637: Performed by: INTERNAL MEDICINE

## 2018-09-14 PROCEDURE — 74011636637 HC RX REV CODE- 636/637: Performed by: ORTHOPAEDIC SURGERY

## 2018-09-14 PROCEDURE — 82962 GLUCOSE BLOOD TEST: CPT

## 2018-09-14 PROCEDURE — 93005 ELECTROCARDIOGRAM TRACING: CPT

## 2018-09-14 PROCEDURE — 74011636637 HC RX REV CODE- 636/637: Performed by: INTERNAL MEDICINE

## 2018-09-14 PROCEDURE — 97110 THERAPEUTIC EXERCISES: CPT

## 2018-09-14 RX ORDER — INSULIN GLARGINE 100 [IU]/ML
5 INJECTION, SOLUTION SUBCUTANEOUS ONCE
Status: COMPLETED | OUTPATIENT
Start: 2018-09-14 | End: 2018-09-14

## 2018-09-14 RX ORDER — METOPROLOL TARTRATE 50 MG/1
100 TABLET ORAL 2 TIMES DAILY
Status: DISCONTINUED | OUTPATIENT
Start: 2018-09-14 | End: 2018-10-04 | Stop reason: HOSPADM

## 2018-09-14 RX ORDER — ATORVASTATIN CALCIUM 10 MG/1
10 TABLET, FILM COATED ORAL
Status: DISCONTINUED | OUTPATIENT
Start: 2018-09-14 | End: 2018-09-14

## 2018-09-14 RX ORDER — CHLORPROMAZINE HYDROCHLORIDE 25 MG/ML
25 INJECTION INTRAMUSCULAR ONCE
Status: CANCELLED | OUTPATIENT
Start: 2018-09-14 | End: 2018-09-14

## 2018-09-14 RX ORDER — ATORVASTATIN CALCIUM 10 MG/1
40 TABLET, FILM COATED ORAL
Status: DISCONTINUED | OUTPATIENT
Start: 2018-09-14 | End: 2018-09-14 | Stop reason: HOSPADM

## 2018-09-14 RX ORDER — METFORMIN HYDROCHLORIDE 500 MG/1
500 TABLET ORAL 2 TIMES DAILY WITH MEALS
Status: DISCONTINUED | OUTPATIENT
Start: 2018-09-14 | End: 2018-10-04 | Stop reason: HOSPADM

## 2018-09-14 RX ORDER — LORAZEPAM 0.5 MG/1
0.5 TABLET ORAL
Status: DISCONTINUED | OUTPATIENT
Start: 2018-09-14 | End: 2018-09-14

## 2018-09-14 RX ORDER — ADHESIVE BANDAGE
30 BANDAGE TOPICAL DAILY PRN
Status: DISCONTINUED | OUTPATIENT
Start: 2018-09-14 | End: 2018-10-04 | Stop reason: HOSPADM

## 2018-09-14 RX ORDER — METOPROLOL TARTRATE 50 MG/1
100 TABLET ORAL
Status: DISCONTINUED | OUTPATIENT
Start: 2018-09-14 | End: 2018-09-14 | Stop reason: SDUPTHER

## 2018-09-14 RX ORDER — HYDROCODONE BITARTRATE AND ACETAMINOPHEN 5; 325 MG/1; MG/1
1-2 TABLET ORAL
Qty: 40 TAB | Refills: 0 | Status: SHIPPED
Start: 2018-09-14

## 2018-09-14 RX ORDER — CHLORPROMAZINE HYDROCHLORIDE 25 MG/ML
25 INJECTION INTRAMUSCULAR
Status: DISCONTINUED | OUTPATIENT
Start: 2018-09-14 | End: 2018-09-15

## 2018-09-14 RX ORDER — METFORMIN HYDROCHLORIDE 500 MG/1
500 TABLET ORAL 2 TIMES DAILY WITH MEALS
Qty: 60 TAB | Refills: 0 | Status: SHIPPED
Start: 2018-09-14

## 2018-09-14 RX ORDER — TAMSULOSIN HYDROCHLORIDE 0.4 MG/1
0.4 CAPSULE ORAL
Status: DISCONTINUED | OUTPATIENT
Start: 2018-09-14 | End: 2018-10-04 | Stop reason: HOSPADM

## 2018-09-14 RX ORDER — ATORVASTATIN CALCIUM 40 MG/1
40 TABLET, FILM COATED ORAL
Status: DISCONTINUED | OUTPATIENT
Start: 2018-09-14 | End: 2018-10-04 | Stop reason: HOSPADM

## 2018-09-14 RX ORDER — HYDROCODONE BITARTRATE AND ACETAMINOPHEN 5; 325 MG/1; MG/1
1-2 TABLET ORAL
Status: DISCONTINUED | OUTPATIENT
Start: 2018-09-14 | End: 2018-10-04 | Stop reason: HOSPADM

## 2018-09-14 RX ORDER — RANOLAZINE 500 MG/1
500 TABLET, EXTENDED RELEASE ORAL 2 TIMES DAILY
Status: DISCONTINUED | OUTPATIENT
Start: 2018-09-14 | End: 2018-10-04 | Stop reason: HOSPADM

## 2018-09-14 RX ORDER — ASPIRIN 81 MG/1
81 TABLET ORAL DAILY
Status: DISCONTINUED | OUTPATIENT
Start: 2018-09-15 | End: 2018-10-04 | Stop reason: HOSPADM

## 2018-09-14 RX ORDER — INSULIN GLARGINE 100 [IU]/ML
22 INJECTION, SOLUTION SUBCUTANEOUS DAILY
Status: DISCONTINUED | OUTPATIENT
Start: 2018-09-15 | End: 2018-09-14

## 2018-09-14 RX ORDER — DILTIAZEM HYDROCHLORIDE 180 MG/1
180 CAPSULE, COATED, EXTENDED RELEASE ORAL DAILY
Status: DISCONTINUED | OUTPATIENT
Start: 2018-09-15 | End: 2018-09-14 | Stop reason: HOSPADM

## 2018-09-14 RX ORDER — METFORMIN HYDROCHLORIDE 500 MG/1
500 TABLET ORAL 2 TIMES DAILY WITH MEALS
Status: DISCONTINUED | OUTPATIENT
Start: 2018-09-14 | End: 2018-09-14 | Stop reason: HOSPADM

## 2018-09-14 RX ORDER — NITROGLYCERIN 80 MG/1
1 PATCH TRANSDERMAL DAILY
Status: DISCONTINUED | OUTPATIENT
Start: 2018-09-15 | End: 2018-10-04 | Stop reason: HOSPADM

## 2018-09-14 RX ORDER — DILTIAZEM HYDROCHLORIDE 180 MG/1
180 CAPSULE, COATED, EXTENDED RELEASE ORAL DAILY
Status: DISCONTINUED | OUTPATIENT
Start: 2018-09-15 | End: 2018-09-28

## 2018-09-14 RX ORDER — LORAZEPAM 0.5 MG/1
0.5 TABLET ORAL
Status: DISCONTINUED | OUTPATIENT
Start: 2018-09-14 | End: 2018-10-04 | Stop reason: HOSPADM

## 2018-09-14 RX ORDER — ACETAMINOPHEN 325 MG/1
650 TABLET ORAL
Status: DISCONTINUED | OUTPATIENT
Start: 2018-09-14 | End: 2018-10-04 | Stop reason: HOSPADM

## 2018-09-14 RX ORDER — NITROGLYCERIN 80 MG/1
1 PATCH TRANSDERMAL DAILY
Status: DISCONTINUED | OUTPATIENT
Start: 2018-09-15 | End: 2018-09-14 | Stop reason: HOSPADM

## 2018-09-14 RX ORDER — DILTIAZEM HYDROCHLORIDE 30 MG/1
30 TABLET, FILM COATED ORAL
Status: COMPLETED | OUTPATIENT
Start: 2018-09-14 | End: 2018-09-14

## 2018-09-14 RX ORDER — ONDANSETRON 2 MG/ML
4 INJECTION INTRAMUSCULAR; INTRAVENOUS
Status: DISCONTINUED | OUTPATIENT
Start: 2018-09-14 | End: 2018-10-04 | Stop reason: HOSPADM

## 2018-09-14 RX ADMIN — RANOLAZINE 500 MG: 500 TABLET, FILM COATED, EXTENDED RELEASE ORAL at 09:00

## 2018-09-14 RX ADMIN — RIVAROXABAN 20 MG: 20 TABLET, FILM COATED ORAL at 19:56

## 2018-09-14 RX ADMIN — INSULIN LISPRO 4 UNITS: 100 INJECTION, SOLUTION INTRAVENOUS; SUBCUTANEOUS at 08:01

## 2018-09-14 RX ADMIN — Medication 10 ML: at 12:28

## 2018-09-14 RX ADMIN — INSULIN GLARGINE 15 UNITS: 100 INJECTION, SOLUTION SUBCUTANEOUS at 09:01

## 2018-09-14 RX ADMIN — INSULIN GLARGINE 5 UNITS: 100 INJECTION, SOLUTION SUBCUTANEOUS at 12:19

## 2018-09-14 RX ADMIN — INSULIN LISPRO 6 UNITS: 100 INJECTION, SOLUTION INTRAVENOUS; SUBCUTANEOUS at 12:19

## 2018-09-14 RX ADMIN — CHLORPROMAZINE HYDROCHLORIDE 25 MG: 25 INJECTION INTRAMUSCULAR at 05:44

## 2018-09-14 RX ADMIN — TAMSULOSIN HYDROCHLORIDE 0.4 MG: 0.4 CAPSULE ORAL at 09:00

## 2018-09-14 RX ADMIN — HYDROCODONE BITARTRATE AND ACETAMINOPHEN 2 TABLET: 5; 325 TABLET ORAL at 05:45

## 2018-09-14 RX ADMIN — DILTIAZEM HYDROCHLORIDE 240 MG: 240 CAPSULE, COATED, EXTENDED RELEASE ORAL at 09:01

## 2018-09-14 RX ADMIN — DILTIAZEM HYDROCHLORIDE 30 MG: 30 TABLET, FILM COATED ORAL at 19:19

## 2018-09-14 RX ADMIN — CHLORPROMAZINE HYDROCHLORIDE 25 MG: 25 INJECTION INTRAMUSCULAR at 23:18

## 2018-09-14 RX ADMIN — METFORMIN HYDROCHLORIDE 500 MG: 500 TABLET ORAL at 19:00

## 2018-09-14 RX ADMIN — CHLORPROMAZINE HYDROCHLORIDE 25 MG: 25 INJECTION INTRAMUSCULAR at 12:26

## 2018-09-14 RX ADMIN — ASPIRIN 81 MG: 81 TABLET, COATED ORAL at 09:00

## 2018-09-14 RX ADMIN — TAMSULOSIN HYDROCHLORIDE 0.4 MG: 0.4 CAPSULE ORAL at 21:28

## 2018-09-14 RX ADMIN — CHLORPROMAZINE HYDROCHLORIDE 25 MG: 25 INJECTION INTRAMUSCULAR at 01:03

## 2018-09-14 RX ADMIN — METOPROLOL TARTRATE 100 MG: 50 TABLET ORAL at 19:21

## 2018-09-14 RX ADMIN — MORPHINE SULFATE 2 MG: 2 INJECTION, SOLUTION INTRAMUSCULAR; INTRAVENOUS at 12:28

## 2018-09-14 RX ADMIN — ATORVASTATIN CALCIUM 40 MG: 40 TABLET, FILM COATED ORAL at 21:28

## 2018-09-14 RX ADMIN — RANOLAZINE 500 MG: 500 TABLET, FILM COATED, EXTENDED RELEASE ORAL at 19:56

## 2018-09-14 RX ADMIN — PANTOPRAZOLE SODIUM 40 MG: 40 TABLET, DELAYED RELEASE ORAL at 06:51

## 2018-09-14 RX ADMIN — Medication 10 ML: at 05:45

## 2018-09-14 NOTE — PROGRESS NOTES
PROGRESS NOTE Patient: Messi Winslow MRN: [id number]  CSN: [id number] YOB: 1950  Age: 70 y.o. Sex: male DOA: 9/5/2018 LOS:  LOS: 9 days Diagnosis: L/S STENOSIS SPONDYL M48.061 M43.16   
   
Procedure: 
DECOMPRESSION L1-2 2-5, POSTEROLATERAL SPINE FUSION FLOSPINE L4-5 
 
HPI:  
Hiccups basically resolved but still asking for thorazine Tolerating diet No N/V or abdominal pain, one well formed stool today Breathing in room with RW with  CGA Voiding well Back pain controlled No recurrence of right leg radicular pain ACTIVE MEDICAL PROBLEMS/PMH/PSH 
3-vessel coronary artery disease with chronically occluded right coronary artery. He had previous stent to the proximal circumflex artery with significant disease distal left circumflex and distal LAD. 
  
Paroxysmal atrial fibrillation Chronic OAC with xarelto HTN 
T2DM 
  
LEXISCAN NUCLEAR STRESS TEST 8/21/2018 Findings: 
the stress myocardial perfusion study shows mild decreased perfusion inferior wall and inferolateral wall, the resting study shows normal myocardial perfusion. The EKG gated study shows normal wall motion normal contractility the calculated ejection fraction is 65% 
  
Impressions: 
1 abnormal myocardial perfusion with inferolateral ischemia 
2 normal left ventricular systolic function 
  
Meniere's disease 
  
     
Past Surgical History:  
Procedure Laterality Date  HX HEART CATHETERIZATION      
  Stent  HX ORTHOPAEDIC      
  cervical sx  
  
Hospital Problems  Never Reviewed Codes Class Noted POA Normal cardiac stress test ICD-10-CM: Z13.6 ICD-9-CM: V81.2  9/9/2018 Unknown Overview Signed 9/9/2018 11:48 AM by MD Maite Maya 8/22/2018 normal perfusion scan EF 65% Paroxysmal A-fib (HCC) ICD-10-CM: I48.0 ICD-9-CM: 427.31  9/7/2018 Unknown Microcytic anemia ICD-10-CM: D50.9 ICD-9-CM: 280.9  9/7/2018 Unknown DM (diabetes mellitus) ([facility] ICD-10-CM: E11.9 ICD-9-CM: 250.00  9/7/2018 Unknown HTN (hypertension) ICD-10-CM: I10 
ICD-9-CM: 401.9  9/7/2018 Unknown CAD (coronary artery disease) ICD-10-CM: I25.10 ICD-9-CM: 414.00  9/7/2018 Unknown Hiccup ICD-10-CM: R06.6 ICD-9-CM: 786.8  9/7/2018 Unknown Ileus ([facility] ICD-10-CM: K56.7 ICD-9-CM: 560.1  9/7/2018 Unknown Urine retention ICD-10-CM: R33.9 ICD-9-CM: 788.20  9/7/2018 Unknown Lumbar stenosis ICD-10-CM: M48.061 
ICD-9-CM: 724.02  9/5/2018 Unknown Patient Vitals for the past 24 hrs: 
 Temp Pulse Resp BP SpO2  
09/14/18 1050 97.4 °F (36.3 °C) 64 16 118/68 96 % 09/14/18 0854 97 °F (36.1 °C) 73 16 109/54 99 % 09/14/18 0744 98.1 °F (36.7 °C) 61 16 96/57 96 % 09/14/18 0515 98 °F (36.7 °C) 60 16 110/55 97 % 09/14/18 0043 97.4 °F (36.3 °C) 68 16 105/61 98 % 09/13/18 2033 97.5 °F (36.4 °C) 72 18 144/74 99 % 09/13/18 1511 97.4 °F (36.3 °C) 65 16 132/63 96 % ROS: 
Constitutional: No headache. Cardiac: No CP Respiratory: No cough GI: HPI 
: HPI 
M/S:  back pain controlled Neuro: No numbness. No Weakness; no leg pain Skin: No itching PHYSICAL EXAM: 
GENERAL: Looks and feels much better LUNGS: CTA. BS Normal Bilaterally HEART: RRR 5-3/4  Aortic systolic murmur No S3 S4 ABDOMEN: less distended; NL BS NT 
LE: No edema 
 
  
 
Intake/Output Summary (Last 24 hours) at 09/14/18 1334 Last data filed at 09/14/18 1248 Gross per 24 hour Intake             1200 ml Output              675 ml Net              525 ml  
 
 
Current Shift:  09/14 0701 - 09/14 1900 In: 480 [P.O.:480] Out: - Last three shifts:  09/12 1901 - 09/14 0700 In: 1200 [P.O.:1200] Out: 5559 [HXTFL:0478] Recent Results (from the past 24 hour(s)) GLUCOSE, POC Collection Time: 09/13/18  5:05 PM  
Result Value Ref Range Glucose (POC) 268 (H) 70 - 110 mg/dL GLUCOSE, POC  Collection Time: 09/13/18  9:44 PM  
 Result Value Ref Range Glucose (POC) 304 (H) 70 - 110 mg/dL GLUCOSE, POC Collection Time: 09/14/18  5:56 AM  
Result Value Ref Range Glucose (POC) 228 (H) 70 - 110 mg/dL GLUCOSE, POC Collection Time: 09/14/18 11:11 AM  
Result Value Ref Range Glucose (POC) 280 (H) 70 - 110 mg/dL Lab Results Component Value Date/Time Glucose 209 (H) 09/13/2018 03:00 AM  
 Glucose 160 (H) 09/09/2018 04:15 AM  
 Glucose 132 (H) 09/08/2018 04:00 AM  
 Glucose 122 (H) 09/07/2018 03:55 AM  
 Glucose 197 (H) 12/22/2010 05:45 AM  
  
Tele: SR; PVC, QT interval 0.40 CXR : Low lung volumes with right basilar atelectasis ASSESSMENT Doing much better Hiccups basically resolved Abdominal distention  ; nl BS; small loose- suggestive of partial ileus resolving Lung Hypoinflation and atelectasis-ICS Paroxismal A-fib; remains in   
[address] with Xarelto Back pain  well controlled Right leg radicular pain resolved Post anemia and  Iron studies suggestive of LAYLA post VenoFer 3 vessels CAD Preserved LVEF Abnormal myocardial perfusion with inferolateral ischemia 8/21/2018 T2DM HTN 
 
PLAN Stable to transfer to [facility]-will follow Continue same meds (medications just updated) Transfer Medications ASA 81 mg po daily Atorvastatin 40 mg po QPM 
Diltiazem  mg po daily Metformin 500 mg po BID Metoprolol tartrate 100 mg po BID Nitrodur 0.4 mg/hr patch daily Pantoprazole 40 mg po QAM 
Renolazine ER (Ranexa) 500 mg BID Tamsulosin (Flomax) 0.4 mg 1 capsule po QHS Tylenol 650 mg po Q 4 hrs PRN Thorazine 25 mg IM Q 4hrs PRN Norco 5-325 mg 1-2 tabs po Q 6hrs PRN 
MOM 30 ml po daily PRN Zofran 4 mg IM Q4 hrs PRN Lorazepam 0.5 mg po QHS prn Trista Woodall MD 
9/14/2018, 2:18 PM

## 2018-09-14 NOTE — DISCHARGE INSTRUCTIONS
DISCHARGE SUMMARY from Nurse    PATIENT INSTRUCTIONS:    After general anesthesia or intravenous sedation, for 24 hours or while taking prescription Narcotics:  · Limit your activities  · Do not drive and operate hazardous machinery  · Do not make important personal or business decisions  · Do  not drink alcoholic beverages  · If you have not urinated within 8 hours after discharge, please contact your surgeon on call. Report the following to your surgeon:  · Excessive pain, swelling, redness or odor of or around the surgical area  · Temperature over 100.5  · Nausea and vomiting lasting longer than 4 hours or if unable to take medications  · Any signs of decreased circulation or nerve impairment to extremity: change in color, persistent  numbness, tingling, coldness or increase pain  · Any questions    What to do at [address] [place] [address] ([facility]):    Disposition:  Clear for discharge to [facility], if clear by medicine service. Follow-up in the office in 1 month with Dr. Alycia Silver; call [phone] to schedule appointment. Wound Care: Dressing down when incision dry and may shower. Weightbearing Status: WBAT with brace and walker    Follow up with Dr. Alycia Silver. Call [phone] for appointment. May shower when wound is dry. Follow spine precautions and use brace and walker when out of bed. * Recommended activity: see above    * If you experience any of the following symptoms ***, please follow up with ***. *  Please give a list of your current medications to your Primary Care Provider. *  Please update this list whenever your medications are discontinued, doses are      changed, or new medications (including over-the-counter products) are added. *  Please carry medication information at all times in case of emergency situations.     These are general instructions for a healthy lifestyle:    No smoking/ No tobacco products/ Avoid exposure to second hand smoke  Surgeon General's Warning:  Quitting smoking now greatly reduces serious risk to your health. Obesity, smoking, and sedentary lifestyle greatly increases your risk for illness    A healthy diet, regular physical exercise & weight monitoring are important for maintaining a healthy lifestyle    You may be retaining fluid if you have a history of heart failure or if you experience any of the following symptoms:  Weight gain of 3 pounds or more overnight or 5 pounds in a week, increased swelling in our hands or feet or shortness of breath while lying flat in bed. Please call your doctor as soon as you notice any of these symptoms; do not wait until your next office visit. Recognize signs and symptoms of STROKE:    F-face looks uneven    A-arms unable to move or move unevenly    S-speech slurred or non-existent    T-time-call 911 as soon as signs and symptoms begin-DO NOT go       Back to bed or wait to see if you get better-TIME IS BRAIN. Warning Signs of HEART ATTACK     Call 911 if you have these symptoms:   Chest discomfort. Most heart attacks involve discomfort in the center of the chest that lasts more than a few minutes, or that goes away and comes back. It can feel like uncomfortable pressure, squeezing, fullness, or pain.  Discomfort in other areas of the upper body. Symptoms can include pain or discomfort in one or both arms, the back, neck, jaw, or stomach.  Shortness of breath with or without chest discomfort.  Other signs may include breaking out in a cold sweat, nausea, or lightheadedness. Don't wait more than five minutes to call 911 - MINUTES MATTER! Fast action can save your life. Calling 911 is almost always the fastest way to get lifesaving treatment. Emergency Medical Services staff can begin treatment when they arrive -- up to an hour sooner than if someone gets to the hospital by car. Patient {ARMBANDS:20124}    The discharge information has been reviewed with the {PATIENT PARENT GUARDIAN:76188}.   The {PATIENT PARENT GUARDIAN:73798} verbalized understanding. Discharge medications reviewed with the {Dishcarge meds reviewed KCBI:62810} and appropriate educational materials and side effects teaching were provided.

## 2018-09-14 NOTE — ROUTINE PROCESS
Bedside and Verbal shift change report given to [address] (oncoming nurse) by Alpa Cuello RN 
 (offgoing nurse). Report included the following information SBAR, Kardex and MAR.

## 2018-09-14 NOTE — PROGRESS NOTES
Problem: Mobility Impaired (Adult and Pediatric) Goal: *Acute Goals and Plan of Care (Insert Text) Physical Therapy Goals: CONTINUE GOALS Initiated 9/5/2018 and to be accomplished within 7 days. 1.  Patient will complete all bed mobility with minimal assistance/contact guard assist in order to prepare for EOB/OOB activity. 2.  Patient will perform sit <> stand with supervision/set-up in order to prepare for OOB/gait activity. 3.  Patient will perform bed to chair transfers with minimal assistance/contact guard assist in order to promote mobility and encourage seated activity to progress towards their prior level of function. 4.  Patient will ambulate 50 feet with supervision/set-up using LRAD in order to prepare for safe negotiation of their environment. Outcome: Progressing Towards Goal 
 
PHYSICAL THERAPY: Daily TREATMENT Note INPATIENT: Medicare: Hospital Day: 10 Patient: Ryanne Gonzalez (89 y.o. male)    Date: 9/14/2018 Primary Diagnosis: STENOSIS SPONDYL M48.061 M43.16 Lumbar stenosis Procedure(s) (LRB): 
DECOMPRESSION L1-2 2-5, POSTEROLATERAL SPINE FUSION FLOSPINE L4-5 (N/A), 9 Days Post-Op, Precautions: Spinal 
 
Chart, physical therapy assessment, plan of care and goals were reviewed. PLOF: Independent ASSESSMENT: 
Patient supine in bed, agreeable to participation with PT. Reports that he is transferring to [facility] later today. MOD a x 1 for supine <> sit. CGA for sit <> stand with RW. Ambulation x 160 ft with RW and standing rest breaks. Patient ambulates with improved gait speed. Returned to room and left sitting EOB on phone with tray in front. All needs within reach. URIEL Brown, notified that patient back in room and sitting EOB. No c/o pain. Progression toward goals: 
      Improving appropriately and progressing toward goals PLAN: 
Patient continues to benefit from skilled intervention to address the above impairments. Continue treatment per established plan of care. EDUCATION:  
Education:  Patient was educated on the following topics: safety with gait. Verbalizes understanding. Barriers to Learning/Limitations: None Compensate with: visual, verbal, tactile, kinesthetic cues/model Discharge Recommendations:  Rehab Further Equipment Recommendations for Discharge:  rolling walker Factors which may impact discharge planning: N/A  
 
SUBJECTIVE:  
Patient stated I'm going upstairs today.  OBJECTIVE DATA SUMMARY:  
Critical Behavior: 
Neurologic State: Alert Orientation Level: Oriented X4 Cognition: Follows commands Safety/Judgement: Fall prevention G CODE:Mobility [id number] Current  CL= 60-79%   Goal  CL= 60-79%. The severity rating is based on the Other [facility] Balance Scale 2/5 209 [address] Standing Balance Scale 2/5 
0: Pt performs 25% or less of standing activity (Max assist) CN, 100% impaired. 1: Pt supports self with upper extremities but requires therapist assistance. Pt performs 25-50% of effort (Mod assist) CM, 80% to <100% impaired. 1+: Pt supports self with upper extremities but requires therapist assistance. Pt performs >50% effort. (Min assist). CL, 60% to <80% impaired. 2: Pt supports self independently with both upper extremities (walker, crutches, parallel bars). CL, 60% to <80% impaired. 2+: Pt support self independently with 1 upper extremity (cane, crutch, 1 parallel bar). CK, 40% to <60% impaired. 3: Pt stands without upper extremity support for up to 30 seconds. CK, 40% to <60% impaired. 3+: Pt stands without upper extremity support for 30 seconds or greater. CJ, 20% to <40% impaired. 4: Pt independently moves and returns center of gravity 1-2 inches in one plane. CJ, 20% to <40% impaired. 4+: Pt independently moves and returns center of gravity 1-2 inches in multiple planes. CI, 1% to <20% impaired. 5: Pt independently moves and returns center of gravity in all planes greater than 2 inches. CH, 0% impaired. Functional Mobility: 
 
 
Functional Status Indep (I) Mod I Super-vision Min A Mod A Max A Total A Assist x2 Verbal cues Additional time Not tested Comments Rolling []  []  [] []    []    []  []  [] [] [] [x] Supine to sit []  []  [] []  [x]  []  []  [] [] [] [] Sit to supine []  []  [] []  []  []  []  [] [] [] [x] Sit to stand []  []  [x] []  []  []  []  [] [] [] []   
Stand to sit []  []  [x] []  []  []  []  [] [] [] [] Bed to chair transfers []  []  [] []  []  []  []  [] [] [] [x] Balance Good [place] Plain Poor Unable Not tested Comments Sitting static [x]  []  []  []  [] Sitting dynamic [x]  []  []  []  []   
Standing static []  [x]  []  []  []   
Standing dynamic []  [x]  []  []  [] Mobility/Gait:  
Level of Assistance: Supervision Assistive Device: rolling walker Distance Ambulated: 160 feet Left Lower Extremity: FWB Right Lower Extremity: FWB Base of Support: center of gravity altered Speed/Elena: pace decreased (<100 feet/min) Step Length: [facility] Swing Pattern: [facility] Stance: [facility] Gait Abnormalities: altered arm swing Vital Signs Temp: 97.3 °F (36.3 °C) Pulse (Heart Rate): 70 BP: 106/65 Resp Rate: 16    
O2 Sat (%): 95 % Pain: None Activity Tolerance:  
good After treatment:  
Patient left in no apparent distress in bed Call bell left within reach Nursing notified Rut Varela Time Calculation: 25 mins

## 2018-09-14 NOTE — ROUTINE PROCESS
Patient has redness to lower back, sacrum and buttocks, dry skin to bilateral heels. No open areas noted.

## 2018-09-14 NOTE — ROUTINE PROCESS
TRANSFER - IN REPORT:    Verbal report received from Michel Lizarraga RN(name) on  Corporation  being received from 2C(unit) for routine progression of care      Report consisted of patients Situation, Background, Assessment and   Recommendations(SBAR). Information from the following report(s) SBAR, Kardex and MAR was reviewed with the receiving nurse. Opportunity for questions and clarification was provided. Assessment completed upon patients arrival to unit and care assumed.

## 2018-09-14 NOTE — IP AVS SNAPSHOT
[address] [address] Patient: Ruben Strickland MRN: [id number] GBJ:[id number] A check jessica indicates which time of day the medication should be taken. My Medications ASK your doctor about these medications Instructions Each Dose to Equal  
 Morning Noon Evening Bedtime  
 aspirin delayed-release 81 mg tablet Your last dose was: Your next dose is: Take 1 Tab by mouth daily. 1 Tab  
    
   
   
   
  
 atorvastatin 40 mg tablet Commonly known as:  LIPITOR Your last dose was: Your next dose is: Take 40 mg by mouth nightly. 40 mg HYDROcodone-acetaminophen 5-325 mg per tablet Commonly known as:  Joe Side Your last dose was: Your next dose is: Take 1-2 Tabs by mouth every six (6) hours as needed. Max Daily Amount: 8 Tabs. Indications: Pain 1-2 Tab  
    
   
   
   
  
 metFORMIN 500 mg tablet Commonly known as:  GLUCOPHAGE Your last dose was: Your next dose is: Take 1 Tab by mouth two (2) times daily (with meals). 500 mg  
    
   
   
   
  
 metoprolol tartrate 100 mg IR tablet Commonly known as:  LOPRESSOR Your last dose was: Your next dose is: Take 100 mg by mouth two (2) times a day. 100 mg  
    
   
   
   
  
 nitroglycerin 0.4 mg/hr Commonly known as:  XIOLVZJG Your last dose was: Your next dose is:    
   
   
 1 Patch by TransDERmal route daily. 1 Patch  
    
   
   
   
  
 ranolazine  mg SR tablet Commonly known as:  RANEXA Your last dose was: Your next dose is: Take 500 mg by mouth two (2) times a day. 500 mg XARELTO 20 mg Tab tablet Generic drug:  rivaroxaban Your last dose was: Your next dose is: Take 20 mg by mouth daily (with breakfast).   
 20 mg

## 2018-09-14 NOTE — DISCHARGE SUMMARY
Discharge Summary    Admit Date: 2018  Discharge Date:  2018     Patient ID:   Name:  Davion Son      Age:  70 y.o.    :  1950    Admitting Diagnosis: STENOSIS SPONDYL M48.061 M43.16  Lumbar stenosis     Post Operative Day: 10    Operative Procedures:  LAMINEC/FACETECT/FORAMIN,LUMBAR [84536] (SPINE LUMBAR POSTERIOR INTERBODY FUSION (PLIF))  LAMINEC/FACETECT/FORAMIN,EACH ADDNL [39643]  PA ARTHRODESIS POSTERIOR/POSTEROLATERAL LUMBAR [68189]  SPINE FIXATION,POST,GILES [70154]  SPIN BONE AUTOGRFT LOCAL [74501]      Isolation Precautions:  Not required. Patient is not currently contagious. Physical Exam on Discharge:  Visit Vitals    /68 (BP 1 Location: Left arm, BP Patient Position: At rest)    Pulse 64    Temp 97.4 °F (36.3 °C)    Resp 16    Ht 5' 6\" (1.676 m)    Wt 94.3 kg (208 lb)    SpO2 96%    BMI 33.57 kg/m2       General: in no apparent distress, well developed and well nourished, non-toxic, in no respiratory distress and acyanotic, alert and oriented times 3   Wound: no drainage   Extremities:  Motor: UE and LE strength 5/5 throughout bilaterally. Muscle tone and bulk normal.     L hip F/E 5/5 knee F/E 5/5 ankle F/E 5/5, EHL 5/5  R hip F/E 5/5 knee F/E 5/5 ankle F/E 5/5, EHL 5/5     Sensory:    Light Touch : BLE intact and equal bilaterally      Plantar reflex downwards bilaterally.      Patient denies bowel dysfunction or saddle anesthesia      Dressing:  cdi   DVT Exam:   No exam evidence to suggest DVT.  Compartments soft and NT.         Relevant labs within last 72 hours:    CBC w/Diff    Lab Results   Component Value Date/Time    WBC 7.8 2018 03:00 AM    RBC 3.86 (L) 2018 03:00 AM    HGB 8.2 (L) 2018 03:00 AM    HCT 26.3 (L) 2018 03:00 AM    MCV 68.1 (L) 2018 03:00 AM    MCH 21.2 (L) 2018 03:00 AM    MCHC 31.2 2018 03:00 AM    RDW 17.6 (H) 2018 03:00 AM    Lab Results   Component Value Date/Time    MONOS 2 (L) 09/13/2018 03:00 AM    EOS 0 09/13/2018 03:00 AM    BASOS 0 09/13/2018 03:00 AM    RDW 17.6 (H) 09/13/2018 03:00 AM              Condition at discharge: Afebrile  Ambulating  Eating, Drinking, Voiding  Stable    Current Discharge Medication List      START taking these medications    Details   HYDROcodone-acetaminophen (NORCO) 5-325 mg per tablet Take 1-2 Tabs by mouth every six (6) hours as needed. Max Daily Amount: 8 Tabs. Indications: Pain  Qty: 40 Tab, Refills: 0    Associated Diagnoses: Spinal stenosis of lumbar region, unspecified whether neurogenic claudication present         CONTINUE these medications which have CHANGED    Details   metFORMIN (GLUCOPHAGE) 500 mg tablet Take 1 Tab by mouth two (2) times daily (with meals). Qty: 60 Tab, Refills: 0         CONTINUE these medications which have NOT CHANGED    Details   rivaroxaban (XARELTO) 20 mg tab tablet Take 20 mg by mouth daily (with breakfast). ranolazine ER (RANEXA) 500 mg SR tablet Take 500 mg by mouth two (2) times a day. atorvastatin (LIPITOR) 40 mg tablet Take 40 mg by mouth nightly. metoprolol tartrate (LOPRESSOR) 100 mg IR tablet Take 100 mg by mouth two (2) times a day. nitroglycerin (NITRODUR) 0.4 mg/hr 1 Patch by TransDERmal route daily. aspirin delayed-release 81 mg tablet Take 1 Tab by mouth daily. Transfer Medications - Per internal medicine  ASA 81 mg po daily  Atorvastatin 40 mg po QPM  Diltiazem  mg po daily  Metformin 500 mg po BID  Metoprolol tartrate 100 mg po BID  Nitrodur 0.4 mg/hr patch daily  Pantoprazole 40 mg po QAM  Renolazine ER (Ranexa) 500 mg BID  Tamsulosin (Flomax) 0.4 mg 1 capsule po QHS  Tylenol 650 mg po Q 4 hrs PRN  Thorazine 25 mg IM Q 4hrs PRN  Norco 5-325 mg 1-2 tabs po Q 6hrs PRN  MOM 30 ml po daily PRN  Zofran 4 mg IM Q4 hrs PRN  Lorazepam 0.5 mg po QHS prn      PCP:  Edin Yeung MD        Disposition:  Clear for discharge to [facility], if clear by medicine service. Follow-up in the office in 1 month with Dr. Francesca Jimenez; call [phone] to schedule appointment. Wound Care: Dressing down when incision dry and may shower.     DVT prophylaxis:  Xarelto per IM    Weightbearing Status: WBAT with brace and walker          -Jorge Luis Salmeron PA-C  9/14/2018  Office: [phone]

## 2018-09-14 NOTE — PROGRESS NOTES
Patient received in bed awake. Patient A&Ox4, denies pain and discomfort. No distress noted. Frequently use items within reach. Bed locked in low position. Call bell within reach and Patient verbalized understanding of use for assistance and needs. 9633- Dr. Jimy Cardenas was called made aware of am /54; prior BP 96/57 and held am Lopressor said \"hold it. \"  
 
[id number]- Dr. Ari Dillard called back to ask about Cardizem made aware was given. Also Dr. Jimy Cardenas was made aware that Patient requesting soup. Dr. Jimy Cardenas made aware no nausea T.O. received for ADA diet. 1217- Patient c/o having hiccups. See MAR for prn Thorazine IM to be given. Call bell w/in reach. 1250- Patient resting. No further c/o having hiccups. Call bell w/in reach. 1640- Patient discharge to [facility] via bed; no distress noted, accompanied by LAURA Verma via of bed. Patient has discharge instructions and belongings. Bev Crowe RN (Disischarge/ Admission nurse) reviewed discharge instructions with Patient.

## 2018-09-14 NOTE — IP AVS SNAPSHOT
[facility] 
 
 
 306 [facility] 43 Patient: Deanna Suarez MRN: [id number] GOU:[id number] About your hospitalization You were admitted on:  September 14, 2018 You last received care in the:  [facility] [address] [place] [address] You were discharged on:  October 4, 2018 Why you were hospitalized Your primary diagnosis was:  Not on File Follow-up Information Follow up With Details Comments Contact Info Ananya Walker MD   [address] [facility] Ananya Walker MD [facility] [address] [address] 
[phone] Kim Bazzi MD On 10/10/2018 Follow-up with Dr. Tan Cross on Wednesday, Oct 10 at 2 pm    phone: [phone] [address] [place] [address] [address] [facility]  [address] [id number] [phone] Sabrina Palomino MD On 12/11/2018 Follow-up with Dr. Bautista Gardner on Tuesday, Dec. 11 at 10:40 am        [address] [place] [address]        phone: [phone]             [employer]  [address] [place] [address] 
[phone] Discharge Orders None A check jessica indicates which time of day the medication should be taken. My Medications ASK your doctor about these medications Instructions Each Dose to Equal  
 Morning Noon Evening Bedtime  
 aspirin delayed-release 81 mg tablet Your last dose was: Your next dose is: Take 1 Tab by mouth daily. 1 Tab  
    
   
   
   
  
 atorvastatin 40 mg tablet Commonly known as:  LIPITOR Your last dose was: Your next dose is: Take 40 mg by mouth nightly. 40 mg HYDROcodone-acetaminophen 5-325 mg per tablet Commonly known as:  Tim Ishikawa Your last dose was: Your next dose is: Take 1-2 Tabs by mouth every six (6) hours as needed. Max Daily Amount: 8 Tabs. Indications: Pain 1-2 Tab  
    
   
   
   
  
 metFORMIN 500 mg tablet Commonly known as:  GLUCOPHAGE Your last dose was: Your next dose is: Take 1 Tab by mouth two (2) times daily (with meals). 500 mg  
    
   
   
   
  
 metoprolol tartrate 100 mg IR tablet Commonly known as:  LOPRESSOR Your last dose was: Your next dose is: Take 100 mg by mouth two (2) times a day. 100 mg  
    
   
   
   
  
 nitroglycerin 0.4 mg/hr Commonly known as:  GNCIOHPZ Your last dose was: Your next dose is:    
   
   
 1 Patch by TransDERmal route daily. 1 Patch  
    
   
   
   
  
 ranolazine  mg SR tablet Commonly known as:  RANEXA Your last dose was: Your next dose is: Take 500 mg by mouth two (2) times a day. 500 mg XARELTO 20 mg Tab tablet Generic drug:  rivaroxaban Your last dose was: Your next dose is: Take 20 mg by mouth daily (with breakfast). 20 mg Opioid Education Prescription Opioids: What You Need to Know: 
 
 
 
  
Learning About Type 2 Diabetes What is type 2 diabetes? Insulin is a hormone that helps your body use sugar from your food as energy. Type 2 diabetes happens when your body can't use insulin the right way. Over time, the pancreas can't make enough insulin. If you don't have enough insulin, too much sugar stays in your blood. If you are overweight, get little or no exercise, or have type 2 diabetes in your family, you are more likely to have problems with the way insulin works in your body.  Americans, Hispanics, Native Americans,  Americans, and Pacific Islanders have a higher risk for type 2 diabetes. Type 2 diabetes can be prevented or delayed with a healthy lifestyle, which includes staying at a healthy weight, making smart food choices, and getting regular exercise. What can you expect with type 2 diabetes? Ambrose Kanner keep hearing about how important it is to keep your blood sugar within a target range. That's because over time, high blood sugar can lead to serious problems. It can: 
· Harm your eyes, nerves, and kidneys. · Damage your blood vessels, leading to heart disease and stroke. · Reduce blood flow and cause nerve damage to parts of your body, especially your feet. This can cause slow healing and pain when you walk. · Make your immune system weak and less able to fight infections. When people hear the word \"diabetes,\" they often think of problems like these. But daily care and treatment can help prevent or delay these problems. The goal is to keep your blood sugar in a target range. That's the best way to reduce your chance of having more problems from diabetes. What are the symptoms? Some people who have type 2 diabetes may not have any symptoms early on. Many people with the disease don't even know they have it at first. But with time, diabetes starts to cause symptoms. You experience most symptoms of type 2 diabetes when your blood sugar is either too high or too low. The most common symptoms of high blood sugar include: · Thirst. 
· Frequent urination. · Weight loss. · Blurry vision. The symptoms of low blood sugar include: · Sweating. · Shakiness. · Weakness. · Hunger. · Confusion. How can you prevent type 2 diabetes?  
The best way to prevent or delay type 2 diabetes is to adopt healthy habits, which include: 
· Staying at a healthy weight. · Exercising regularly. · Eating healthy foods. How is type 2 diabetes treated? If you have type 2 diabetes, here are the most important things you can do. · Take your diabetes medicines. · Check your blood sugar as often as your doctor recommends. Also, get a hemoglobin A1c test at least every 6 months. · Try to eat a variety of foods and to spread carbohydrate throughout the day. Carbohydrate raises blood sugar higher and more quickly than any other nutrient does. Carbohydrate is found in sugar, breads and cereals, fruit, starchy vegetables such as potatoes and corn, and milk and yogurt. · Get at least 30 minutes of exercise on most days of the week. Walking is a good choice. You also may want to do other activities, such as running, swimming, cycling, or playing tennis or team sports. If your doctor says it's okay, do muscle-strengthening exercises at least 2 times a week. · See your doctor for checkups and tests on a regular schedule. · If you have high blood pressure or high cholesterol, take the medicines as prescribed by your doctor. · Do not smoke. Smoking can make health problems worse. This includes problems you might have with type 2 diabetes. If you need help quitting, talk to your doctor about stop-smoking programs and medicines. These can increase your chances of quitting for good. Follow-up care is a key part of your treatment and safety. Be sure to make and go to all appointments, and call your doctor if you are having problems. It's also a good idea to know your test results and keep a list of the medicines you take. Where can you learn more? Go to http://kianna-angella.info/. Enter A546 in the search box to learn more about \"Learning About Type 2 Diabetes. \" Current as of: December 7, 2017 Content Version: 11.8 © 5986-8468 Healthwise, Incorporated.  Care instructions adapted under license by [address] (which disclaims liability or warranty for this information). If you have questions about a medical condition or this instruction, always ask your healthcare professional. [address] any warranty or liability for your use of this information. GÃ©nie NumÃ©rique Announcement We are excited to announce that we are making your provider's discharge notes available to you in GÃ©nie NumÃ©rique. You will see these notes when they are completed and signed by the physician that discharged you from your recent hospital stay. If you have any questions or concerns about any information you see in GÃ©nie NumÃ©rique, please call the Health Information Department where you were seen or reach out to your Primary Care Provider for more information about your plan of care. Introducing [address]! [facility] introduces GÃ©nie NumÃ©rique patient portal. Now you can access parts of your medical record, email your doctor's office, and request medication refills online. 1. In your internet browser, go to [email or web address] 2. Click on the First Time User? Click Here link in the Sign In box. You will see the New Member Sign Up page. 3. Enter your GÃ©nie NumÃ©rique Access Code exactly as it appears below. You will not need to use this code after youve completed the sign-up process. If you do not sign up before the expiration date, you must request a new code. · GÃ©nie NumÃ©rique Access Code: [email or web address] Expires: 11/28/2018  3:59 PM 
 
4. Enter the last four digits of your Social Security Number (xxxx) and Date of Birth (mm/dd/yyyy) as indicated and click Submit. You will be taken to the next sign-up page. 5. Create a [employer] SAt ID. This will be your GÃ©nie NumÃ©rique login ID and cannot be changed, so think of one that is secure and easy to remember. 6. Create a GÃ©nie NumÃ©rique password. You can change your password at any time. 7. Enter your Password Reset Question and Answer. This can be used at a later time if you forget your password. 8. Enter your e-mail address. You will receive e-mail notification when new information is available in [address] 9. Click Sign Up. You can now view and download portions of your medical record. 10. Click the Download Summary menu link to download a portable copy of your medical information. If you have questions, please visit the Frequently Asked Questions section of the [employer] website. Remember, [employer] is NOT to be used for urgent needs. For medical emergencies, dial 911. Now available from your iPhone and Android! Introducing Ronaldo Maki As a Starr Rylie patient, I wanted to make you aware of our electronic visit tool called Ronaldo Maki. [employer] allows you to connect within minutes with a medical provider 24 hours a day, seven days a week via a mobile device or tablet or logging into a secure website from your computer. You can access Ronaldo Maki from anywhere in the [place]. A virtual visit might be right for you when you have a simple condition and feel like you just dont want to get out of bed, or cant get away from work for an appointment, when your regular Starr Gardner provider is not available (evenings, weekends or holidays), or when youre out of town and need minor care. Electronic visits cost only $49 and if the [address] [employer] provider determines a prescription is needed to treat your condition, one can be electronically transmitted to a nearby pharmacy*. Please take a moment to enroll today if you have not already done so. The enrollment process is free and takes just a few minutes. To enroll, please download the [employer] yudith to your tablet or phone, or visit [email or web address] to enroll on your computer.    
And, as an [address] [place] [address] patient with a [employer] account, the results of your visits will be scanned into your electronic medical record and your primary care provider will be able to view the scanned results. We urge you to continue to see your regular Rohan Poplar provider for your ongoing medical care. And while your primary care provider may not be the one available when you seek a Ronaldo Maki virtual visit, the peace of mind you get from getting a real diagnosis real time can be priceless. For more information on Ronaldo De Leónfin, view our Frequently Asked Questions (FAQs) at [email or web address]. Sincerely, 
 
James Sloan MD 
Chief Medical Officer 50Jocelyne Mcmahon Maged *:  certain medications cannot be prescribed via Ronaldo Salvadorfranciscofin Providers Seen During Your Hospitalization Provider Specialty Primary office phone Ventura Holcomb MD Internal Medicine [phone] Your Primary Care Physician (PCP) Primary Care Physician Office Phone Office Fax Jose Kye [phone] You are allergic to the following No active allergies Recent Documentation Height Weight BMI Smoking Status 1.676 m 88.4 kg 31.44 kg/m2 Never Smoker Emergency Contacts Name Discharge Info Relation Home Work Mobile Bernadette Russo DISCHARGE CAREGIVER [3] Spouse [3]   [phone] Patient Belongings The following personal items are in your possession at time of discharge: 
  Dental Appliances: Uppers (Upper dentures only)  Visual Aid: With patient, Glasses      Home Medications: None   Jewelry: None  Clothing: None    Other Valuables: Eyeglasses, Cell Phone, With patient Discharge Instructions Attachments/References A1C (ENGLISH) Patient Handouts Hemoglobin A1c: About This Test 
What is it? Hemoglobin A1c is a blood test that checks your average blood sugar level over the past 2 to 3 months.  This test also is called a glycohemoglobin test or an A1c test. 
Why is this test done? The A1c test is done to check how well your diabetes has been controlled over the past 2 to 3 months. Your doctor can use this information to adjust your medicine and diabetes treatment, if needed. This test is also one of the tests used to diagnose diabetes in adults. How can you prepare for the test? 
You don't need to stop eating before you have an A1c test. This test can be done at any time during the day, even after a meal. 
What happens during the test? 
The health professional taking a sample of your blood will: · Wrap an elastic band around your upper arm. This makes the veins below the band larger so it is easier to put a needle into the vein. · Clean the needle site with alcohol. · Put the needle into the vein. · Attach a tube to the needle to fill it with blood. · Remove the band from your arm when enough blood is collected. · Put a gauze pad or cotton ball over the needle site as the needle is removed. · Put pressure on the site and then put on a bandage. What else should you know about the test? 
The test result is usually given as a percentage. The normal A1c is less than 5.7%. You have a higher risk for diabetes if your A1c is 5.7% to 6.4%. If your level is 6.5% or higher, you have diabetes. The A1c test result also can be used to find your estimated average glucose, or eAG. Your eAG and A1c show the same thing in two different ways. They both help you learn more about your average blood sugar range over the past 2 to 3 months. A1c is shown as a percentage, while eAG uses the same units (mg/dl) as your glucose meter. Examples: · 6% A1c = 126 mg/dL · 7% A1c = 154 mg/dL · 8% A1c = 183 mg/dL · 9% A1c = 212 mg/dL · 10% A1c = 240 mg/dL · 11% A1c = 269 mg/dL · 12% A1c = 298 mg/dL Where can you learn more? Go to http://kianna-angella.info/.  
Enter U216 in the search box to learn more about \"Hemoglobin A1c: About This Test.\" 
 Current as of: December 7, 2017 Content Version: 11.8 © 2006-2018 Healthwise, Incorporated. Care instructions adapted under license by [employer] (which disclaims liability or warranty for this information). If you have questions about a medical condition or this instruction, always ask your healthcare professional. [address] any warranty or liability for your use of this information. Please provide this summary of care documentation to your next provider. Signatures-by signing, you are acknowledging that this After Visit Summary has been reviewed with you and you have received a copy. Patient Signature:  ____________________________________________________________ Date:  ____________________________________________________________  
  
Arlen Lightfamilia Provider Signature:  ____________________________________________________________ Date:  ____________________________________________________________

## 2018-09-14 NOTE — PROGRESS NOTES
Progress Note Post Operative Day: 9 Assessment: 1. Status post  LAMINEC/FACETECT/FORAMIN,LUMBAR [74588] (SPINE LUMBAR POSTERIOR INTERBODY FUSION (PLIF)) LAMINEC/FACETECT/FORAMIN,EACH ADDNL [49391] IA ARTHRODESIS POSTERIOR/POSTEROLATERAL LUMBAR Romeo Mow Lorrie Gonzalez [10902] SPIN BONE AUTOGRFT LOCAL [45704] for STENOSIS SPONDYL M48.061 M43.16 Lumbar stenosis 9/5/2018,  Progressing. PLAN:   
1. Mobilize. Continue P.T. 
2. WBAT with TLSO 3. Xarelto for DVT prophylaxis 4. Discharge Planning; SNF today if medically cleared HPI: Mariana Martinez is a 70 y.o. male patient without new complaints. No new orthopaedic changes. Blood pressure 96/57, pulse 61, temperature 98.1 °F (36.7 °C), resp. rate 16, height 5' 6\" (1.676 m), weight 94.3 kg (208 lb), SpO2 96 %. CBC w/Diff Lab Results Component Value Date/Time WBC 7.8 09/13/2018 03:00 AM  
 RBC 3.86 (L) 09/13/2018 03:00 AM  
 HGB 8.2 (L) 09/13/2018 03:00 AM  
 HCT 26.3 (L) 09/13/2018 03:00 AM  
 MCV 68.1 (L) 09/13/2018 03:00 AM  
 MCH 21.2 (L) 09/13/2018 03:00 AM  
 MCHC 31.2 09/13/2018 03:00 AM  
 RDW 17.6 (H) 09/13/2018 03:00 AM  
 Lab Results Component Value Date/Time MONOS 2 (L) 09/13/2018 03:00 AM  
 EOS 0 09/13/2018 03:00 AM  
 BASOS 0 09/13/2018 03:00 AM  
 RDW 17.6 (H) 09/13/2018 03:00 AM  
  
  
 
 
Physical Assessment: 
General: in no apparent distress, well developed and well nourished, non-toxic, in no respiratory distress and acyanotic, alert and oriented times 3 Wound: no drainage Extremities:  Motor: UE and LE strength 5/5 throughout bilaterally. Muscle tone and bulk normal. 
 
L hip F/E 5/5 knee F/E 5/5 ankle F/E 5/5, EHL 5/5 R hip F/E 5/5 knee F/E 5/5 ankle F/E 5/5, EHL 5/5 Sensory: 
  Light Touch : BLE intact and equal bilaterally Plantar reflex downwards bilaterally. Patient denies bowel dysfunction or saddle anesthesia Dressing:  cdi  
 DVT Exam:   No exam evidence to suggest DVT. Compartments soft and NT. Radiology: no new ortho studies Bradley Mcgill PA-C 
9/14/2018 Office [phone]

## 2018-09-14 NOTE — PROGRESS NOTES
2018:  Patient assisted back to bed from bedside commode. Gown changed tolerated well. Wife at bedside. Call bell within reach. Will continue to monitor.

## 2018-09-14 NOTE — PROGRESS NOTES
[facility]  -  Progress Note Patient: Tika Verma MRN: [id number]  SSN: [id number] YOB: 1950  Age: 70 y.o. Sex: male Admit Date: 9/5/2018 I saw, evaluated, interviewed and examined the patient personally. I agree with the findings and plan of care as documented below with PA-NELLA note Doing better. No cardiac complaints. No angina or heart failure. Continue current cardiac care and cardiac medications. No further cardiac work up planned at this time unless clinical status changes. Call us back if needed. Will be available as needed. Will need to follow up in cardiology clinic at [facility] cardiology group in 2-3 weeks after discharge. Thank you. Raquel Malone MD 
 
 
 
Assessment:  
 
- Status post lumbar spinal surgery 09/05/18 
- CAD: cardiac catheterization 09/2011 demonstrated totally occluded RCA and severe distal LAD and CCx disease. 
- Nuclear stress test with inferolateral ischemia 08/21/18, normal LVEF 
- Paroxysmal afib, on Xarelto - Echo 09/2018 with EF 56-60%, mild concentric LVH, no RWMA, mildly dilated LA, mild aortic stenosis + mild aortic insufficiency, trace mitral regurgitation - Fever, possible infiltrate on CXR 09/8/18 
- Post op ileus - Post op anemia 
- Hiccups - Diabetes Mellitus 
- HTN Plan:  
 
-Echo yesterday with normal EF, no significant findings. 
-Continue current cardiac medication regimen to include ASA, Cardizem, Lopressor, Ranexa, Xarelto. Subjective: No complaints, feeling well. Objective:  
  
Patient Vitals for the past 8 hrs: 
 Temp Pulse Resp BP SpO2  
09/14/18 1050 97.4 °F (36.3 °C) 64 16 118/68 96 % 09/14/18 0854 97 °F (36.1 °C) 73 16 109/54 99 % 09/14/18 0744 98.1 °F (36.7 °C) 61 16 96/57 96 % 09/14/18 0515 98 °F (36.7 °C) 60 16 110/55 97 % Patient Vitals for the past 96 hrs: 
 Weight  
09/13/18 0945 208 lb (94.3 kg) Intake/Output Summary (Last 24 hours) at 09/14/18 1218 Last data filed at 09/14/18 0761 Gross per 24 hour Intake              960 ml Output              675 ml Net              285 ml Physical Exam: 
General:  alert, cooperative, no distress, appears stated age Neck:  No JVD Lungs:  clear to auscultation bilaterally Heart:  Regular rate and rhythm Abdomen:  abdomen is soft without significant tenderness, masses, organomegaly or guarding Extremities:  no edema Data Review:  
 
Labs: Results:  
   
Chemistry Recent Labs  
   09/13/18 
 0300 GLU  209* NA  139  
K  4.1 CL  103 CO2  28 BUN  11  
CREA  1.12  
CA  7.8* AGAP  8  
BUCR  10* CBC w/Diff Recent Labs  
   09/13/18 
 0300 WBC  7.8  
RBC  3.86* HGB  8.2* HCT  26.3*  
PLT  261 GRANS  90* LYMPH  8*  
EOS  0 Thyroid Studies Lab Results Component Value Date/Time  TSH 0.68 09/07/2018 12:43 PM

## 2018-09-14 NOTE — PROGRESS NOTES
Problem: Mobility Impaired (Adult and Pediatric) Goal: *Acute Goals and Plan of Care (Insert Text) Physical Therapy Goals: CONTINUE GOALS Initiated 9/5/2018 and to be accomplished within 7 days. 1.  Patient will complete all bed mobility with minimal assistance/contact guard assist in order to prepare for EOB/OOB activity. 2.  Patient will perform sit <> stand with supervision/set-up in order to prepare for OOB/gait activity. 3.  Patient will perform bed to chair transfers with minimal assistance/contact guard assist in order to promote mobility and encourage seated activity to progress towards their prior level of function. 4.  Patient will ambulate 50 feet with supervision/set-up using LRAD in order to prepare for safe negotiation of their environment. Outcome: Progressing Towards Goal 
 
PHYSICAL THERAPY: Daily TREATMENT Note INPATIENT: Medicare: Hospital Day: 10 Patient: Tika Verma (89 y.o. male)    Date: 9/14/2018 Primary Diagnosis: STENOSIS SPONDYL M48.061 M43.16 Lumbar stenosis Procedure(s) (LRB): 
DECOMPRESSION L1-2 2-5, POSTEROLATERAL SPINE FUSION FLOSPINE L4-5 (N/A), 9 Days Post-Op, Precautions: Spinal 
 
Chart, physical therapy assessment, plan of care and goals were reviewed. PLOF:Indepedent ASSESSMENT: 
Patient supine in bed, agreeable to participation with PT. MOD A x 1 for supine <> sit, patient eligio's good seated balance. Able to don brace with SBA. MIN A x 1 for sit <> stand. Patient able to ambulate 150 ft with RW and CGA with frequent standing rest breaks and additional time to complete (roughly every 15 - 20 ft). Cuing for upright posture with gait. Patient demo's improved activity tolerance. Able to participate in 5 minutes of B LE exercises. Patient returned to chair and left sitting up with all needs within reach. No c/o back or LE pain with mobility. Progression toward goals: 
      Improving appropriately and progressing toward goals PLAN: 
 Patient continues to benefit from skilled intervention to address the above impairments. Continue treatment per established plan of care. EDUCATION:  
Education:  Patient was educated on the following topics: gait training with RW, safety with mobility. Verbalizes understanding. Barriers to Learning/Limitations: None Compensate with: visual, verbal, tactile, kinesthetic cues/model Discharge Recommendations:  Rehab Further Equipment Recommendations for Discharge:  rolling walker Factors which may impact discharge planning: N/A  
 
SUBJECTIVE:  
Patient stated I can do it, I like to walk.  OBJECTIVE DATA SUMMARY:  
Critical Behavior: 
Neurologic State: Alert Orientation Level: Oriented X4 Cognition: Follows commands Safety/Judgement: Fall prevention G CODE:Mobility [id number] Current  CL= 60-79%   Goal  CL= 60-79%. The severity rating is based on the Other [facility] Balance Scale 2/5 [address] Standing Balance Scale 2/5 
0: Pt performs 25% or less of standing activity (Max assist) CN, 100% impaired. 1: Pt supports self with upper extremities but requires therapist assistance. Pt performs 25-50% of effort (Mod assist) CM, 80% to <100% impaired. 1+: Pt supports self with upper extremities but requires therapist assistance. Pt performs >50% effort. (Min assist). CL, 60% to <80% impaired. 2: Pt supports self independently with both upper extremities (walker, crutches, parallel bars). CL, 60% to <80% impaired. 2+: Pt support self independently with 1 upper extremity (cane, crutch, 1 parallel bar). CK, 40% to <60% impaired. 3: Pt stands without upper extremity support for up to 30 seconds. CK, 40% to <60% impaired. 3+: Pt stands without upper extremity support for 30 seconds or greater. CJ, 20% to <40% impaired. 4: Pt independently moves and returns center of gravity 1-2 inches in one plane. CJ, 20% to <40% impaired. 4+: Pt independently moves and returns center of gravity 1-2 inches in multiple planes. CI, 1% to <20% impaired. 5: Pt independently moves and returns center of gravity in all planes greater than 2 inches. CH, 0% impaired. Functional Mobility: 
 
 
Functional Status Indep (I) Mod I Super-vision Min A Mod A Max A Total A Assist x2 Verbal cues Additional time Not tested Comments Rolling []  []  [] []    []    []  []  [] [] [] [x] Supine to sit []  []  [] []  [x]  []  []  [] [] [] [] Sit to supine []  []  [] []  []  []  []  [] [] [] [x] Sit to stand []  []  [] [x]  []  []  []  [] [] [] []   
Stand to sit []  []  [] [x]  []  []  []  [] [] [] [] Bed to chair transfers []  []  [] []  []  []  []  [] [] [] [x] Balance Good [address] Poor Unable Not tested Comments Sitting static [x]  []  []  []  [] Sitting dynamic [x]  []  []  []  []   
Standing static []  [x]  []  []  []   
Standing dynamic []  [x]  []  []  [] Mobility/Gait:  
Level of Assistance: Contact guard assistance Assistive Device: rolling walker Distance Ambulated: 150 feet with standing rest breaks every 15 - 20 ft Left Lower Extremity: FWB Right Lower Extremity: FWB Base of Support: center of gravity altered Speed/Elena: pace decreased (<100 feet/min) Step [facility] Swing Pattern: [facility] Stance: [facility] Gait Abnormalities: altered arm swing Therapeutic Exercises:  
 
 
EXERCISE Sets Reps Active Active Assist  
Passive Self ROM Comments Ankle Pumps    [] [] [] [] Quad Sets/Glut Sets   [] [] [] [] Hamstring Sets   [] [] [] [] Short Arc Quads   [] [] [] [] Heel Slides   [] [] [] [] Straight Leg Raises   [] [] [] [] Hip Abd/Add   [] [] [] [] Long Arc Quads 2 10 [x] [] [] [] bilat Seated Marching 1 10 [x] [] [] [] bilat Standing Marching   [] [] [] [] Calf raises 1 10 [x] [] [] [] bilat Vital Signs Temp: 97 °F (36.1 °C) Pulse (Heart Rate): 73 BP: 109/54 Resp Rate: 16    
O2 Sat (%): 99 % Pain: No c/o during session Activity Tolerance:  
Good After treatment:  
Patient left in no apparent distress sitting up in chair Call bell left within reach Juvencio Kelly Time Calculation: 26 mins

## 2018-09-14 NOTE — PROGRESS NOTES
2000 Patient assisted to bedside camode & had a small amt loose brown stool. Returned to bed. Wife with patient. Call light in reach & side rails up x3. Patient alert & oriented x4. Patient request for Thorazine to be given every 4 hours for hiccups. 2350 In bed resting quietly. Norco given for complain of back pain. 0100 Sleeping at present. 0500 Assisted to bedside camode & had a large amount of soft brown stool.

## 2018-09-14 NOTE — PROGRESS NOTES
Problem: Pressure Injury - Risk of 
Goal: *Prevention of pressure injury Document Alireza Scale and appropriate interventions in the flowsheet. Outcome: Progressing Towards Goal 
Pressure Injury Interventions: 
Sensory Interventions: Assess need for specialty bed Moisture Interventions: Absorbent underpads Activity Interventions: Increase time out of bed Mobility Interventions: HOB 30 degrees or less, Pressure redistribution bed/mattress (bed type) Nutrition Interventions: Document food/fluid/supplement intake Friction and Shear Interventions: Apply protective barrier, creams and emollients, HOB 30 degrees or less

## 2018-09-14 NOTE — ROUTINE PROCESS
Hospital to [facility] 70 y.o.   male Yanci [address]   Room: 2220/01    [facility]  Unit Phone# :  [phone] [facility] 
[address] [facility],[address] Floor 
[address] [place] [address] Dept: [phone] Loc: [phone] SITUATION Admitted:  9/5/2018         Attending Provider:  Rick Carbone MD    
 
Consultations:  IP CONSULT TO HOSPITALIST 
 
PCP:  Britton Macias MD   None Treatment Team: Attending Provider: Rick Carbone MD; Counselor: Tara Barber MD; Utilization Review: Kristie Lee RN Admitting Dx:  STENOSIS SPONDYL M48.061 M43.16 Lumbar stenosis Principal Problem: <principal problem not specified> 
 
9 Days Post-Op of  
Procedure(s): DECOMPRESSION L1-2 2-5, POSTEROLATERAL SPINE FUSION FLOSPINE L4-5 BY: Rick Carbone MD             ON: 9/5/2018 Code Status: Full Code Advance Directives:  
Advance Care Planning 9/5/2018 Patient's Healthcare Decision Maker is: Legal Next of Kin Confirm Advance Directive None (Send w/patient) No Doesnt Have Isolation:  There are currently no Active Isolations       MDRO: No current active infections Pain Medications given:  Morphine IV Last dose: 2 mg at  12:28 Special Equipment needed: yes  Type of equipment: 
 
hemodialysis -Not currently on dialysis BACKGROUND Allergies: 
No Known Allergies Past Medical History:  
Diagnosis Date  Atrial fibrillation ([facility]  CAD (coronary artery disease)  Diabetes ([facility]  Hypertension  Meniere's disease Past Surgical History:  
Procedure Laterality Date  HX HEART CATHETERIZATION Stent  HX ORTHOPAEDIC    
 cervical sx Prescriptions Prior to Admission Medication Sig  
 rivaroxaban (XARELTO) 20 mg tab tablet Take 20 mg by mouth daily (with breakfast).  ranolazine ER (RANEXA) 500 mg SR tablet Take 500 mg by mouth two (2) times a day.  atorvastatin (LIPITOR) 40 mg tablet Take 40 mg by mouth nightly.  metoprolol tartrate (LOPRESSOR) 100 mg IR tablet Take 100 mg by mouth two (2) times a day.  nitroglycerin (NITRODUR) 0.4 mg/hr 1 Patch by TransDERmal route daily.  metFORMIN (GLUCOPHAGE) 1,000 mg tablet Take 500 mg by mouth two (2) times a day.  aspirin delayed-release 81 mg tablet Take 1 Tab by mouth daily. Hard scripts included in transfer packet yes Vaccinations: There is no immunization history on file for this patient. Readmission Risks:    Known Risks: unknown The Charlson CoMorbitiy Index tool is an evidenced based tool that has more automatic generated information. The tool looks at many different items such as the age of the patient, how many times they were admitted in the last calendar year, current length of stay in the hospital and their diagnosis. All of these items are pulled automatically from information documented in the chart from various places and will generate a score that predicts whether a patient is at low (less than 13), medium (13-20) or high (21 or greater) risk of being readmitted. ASSESSMENT Temp: 97.3 °F (36.3 °C) (09/14/18 1433) Pulse (Heart Rate): 70 (09/14/18 1433) Resp Rate: 16 (09/14/18 1433)           BP: 106/65 (09/14/18 1433) O2 Sat (%): 95 % (09/14/18 1433) Weight: 94.3 kg (208 lb)    Height: 5' 6\" (167.6 cm) (09/13/18 0945) If above not within 1 hour of discharge: 
 
BP:_____  P:____  R:____ T:_____ O2 Sat: ___%  O2: ______ Active Orders Diet DIET DIABETIC CONSISTENT CARB Regular Orientation: oriented to time, place, person and situation Active Behaviors: None Active Lines/Drains:  no 
 
Urinary Status: Voiding     Last BM: Last Bowel Movement Date: 09/13/18 Skin Integrity: Incision (comment) Wound Back-DRESSING STATUS: Old drainage Wound Back-DRESSING TYPE: ABD pad, Special tape (comment) Mobility: Slightly limited Weight Bearing Status: WBAT (Weight Bearing as Tolerated) Lab Results Component Value Date/Time Glucose 209 (H) 09/13/2018 03:00 AM  
 Hemoglobin A1c 7.0 (H) 09/06/2018 05:25 AM  
 HGB 8.2 (L) 09/13/2018 03:00 AM  
 HGB 8.4 (L) 09/10/2018 02:00 PM  
  
  RECOMMENDATION See After Visit Summary (AVS) for: · Discharge instructions · After [address] · Special equipment needed (entered pre-discharge by Care Management) · Medication Reconciliation · Follow up Appointment(s) Report given/sent by:  Magalis Greer RN Verbal report given to: Vernon Laughlin LPN  FAXED to:  [phone] Estimated discharge time:  9/14/2018 at 1630

## 2018-09-14 NOTE — PROGRESS NOTES
Problem: Falls - Risk of 
Goal: *Absence of Falls Document Javier Guy Fall Risk and appropriate interventions in the flowsheet. Outcome: Progressing Towards Goal 
Fall Risk Interventions: 
Mobility Interventions: Patient to call before getting OOB Mentation Interventions: Door open when patient unattended, Family/sitter at bedside Medication Interventions: Patient to call before getting OOB, Teach patient to arise slowly Elimination Interventions: Call light in reach, Patient to call for help with toileting needs History of Falls Interventions: Door open when patient unattended

## 2018-09-14 NOTE — PROGRESS NOTES
Care Management Interventions PCP Verified by CM: Yes 
Palliative Care Criteria Met (RRAT>21 & CHF Dx)?: No 
Transition of Care Consult (CM Consult): Discharge Planning Physical Therapy Consult: Yes Current Support Network: Lives with Spouse Plan discussed with Pt/Family/Caregiver: Yes Discharge Location Discharge Placement: Skilled nursing facility [facility]

## 2018-09-14 NOTE — DIABETES MGMT
Glycemic Control Pt requiring 38 units of insulin in the last 24 hours while receiving steroids. Lantus and corrective lispro have been discontinued with anticipated transfer to [facility]. Metformin ordered 500mg BID. Recommend BG monitoring with corrective lispro. Pt with questions re glycemic control and food items, discussed with pt and wife. Diabetes Patient/Family Education Record Factors That  May Influence Patients Ability  to Learn or  Comply with Recommendations 
 []   Language barrier    []   Cultural needs   []   Motivation  
 []   Cognitive limitation    []   Physical   [x]   Education  
 []   Physiological factors   []   Hearing/vision/speaking impairment   []   Catholic beliefs []   Financial factors   []  Other:   []  No factors identified at this time. Person Instructed: 
 [x]   Patient   [x]   Family   []  Other Preference for Learning: 
 [x]   Verbal   []   Written   []  Demonstration Level of Comprehension & Competence:   
[x]  Good                                      [] Fair                                     []  Poor                             [x]  Needs Reinforcement  
[x]  Teachback completed Education Component:  
[x]  Medication management,  and potential medication interactions [x]  Nutritional management   
[]  Exercise  
[]  Signs, symptoms, and treatment of hyperglycemia and hypoglycemia  
[] Prevention, recognition and treatment of hyperglycemia and hypoglycemia [x]  Importance of blood glucose monitoring and how to obtain a blood glucose meter   
[]  Instruction on use of the blood glucose meter  
[]  Discuss the importance of HbA1C monitoring   
[]  Sick day guidelines  
[]  Proper use and disposal of lancets, needles, syringes or insulin pens (if appropriate) []  Potential long-term complications (retinopathy, kidney disease, neuropathy, foot care)  
[] Information about whom to contact in case of emergency or for more information   
[x]  Goal:  Patient/family will demonstrate understanding of Diabetes Self Management Skills by: (date) _9/24______ Plan for post-discharge education or self-management support: 
  [x] Outpatient class schedule provided            [] Patient Declined 
  [] Scheduled for outpatient classes (date) _______ Wilbert Cooper RD 
pgr [phone]

## 2018-09-15 LAB
ATRIAL RATE: 95 BPM
BACTERIA SPEC CULT: NORMAL
BACTERIA SPEC CULT: NORMAL
CALCULATED P AXIS, ECG09: 60 DEGREES
CALCULATED R AXIS, ECG10: 4 DEGREES
CALCULATED T AXIS, ECG11: 90 DEGREES
DIAGNOSIS, 93000: NORMAL
EST. AVERAGE GLUCOSE BLD GHB EST-MCNC: 151 MG/DL
GLUCOSE BLD STRIP.AUTO-MCNC: 206 MG/DL (ref 70–110)
GLUCOSE BLD STRIP.AUTO-MCNC: 206 MG/DL (ref 70–110)
HBA1C MFR BLD: 6.9 % (ref 4.2–5.6)
P-R INTERVAL, ECG05: 152 MS
Q-T INTERVAL, ECG07: 384 MS
QRS DURATION, ECG06: 102 MS
QTC CALCULATION (BEZET), ECG08: 482 MS
SERVICE CMNT-IMP: NORMAL
SERVICE CMNT-IMP: NORMAL
VENTRICULAR RATE, ECG03: 95 BPM

## 2018-09-15 PROCEDURE — 74011250637 HC RX REV CODE- 250/637: Performed by: INTERNAL MEDICINE

## 2018-09-15 PROCEDURE — 82962 GLUCOSE BLOOD TEST: CPT

## 2018-09-15 PROCEDURE — 36415 COLL VENOUS BLD VENIPUNCTURE: CPT | Performed by: INTERNAL MEDICINE

## 2018-09-15 PROCEDURE — 93005 ELECTROCARDIOGRAM TRACING: CPT

## 2018-09-15 PROCEDURE — 83036 HEMOGLOBIN GLYCOSYLATED A1C: CPT | Performed by: INTERNAL MEDICINE

## 2018-09-15 RX ORDER — CHLORPROMAZINE HYDROCHLORIDE 25 MG/ML
25 INJECTION INTRAMUSCULAR
Status: DISCONTINUED | OUTPATIENT
Start: 2018-09-15 | End: 2018-10-04 | Stop reason: HOSPADM

## 2018-09-15 RX ORDER — INSULIN LISPRO 100 [IU]/ML
INJECTION, SOLUTION INTRAVENOUS; SUBCUTANEOUS
Status: DISCONTINUED | OUTPATIENT
Start: 2018-09-16 | End: 2018-10-04 | Stop reason: HOSPADM

## 2018-09-15 RX ADMIN — DILTIAZEM HYDROCHLORIDE 180 MG: 180 CAPSULE, EXTENDED RELEASE ORAL at 09:36

## 2018-09-15 RX ADMIN — RANOLAZINE 500 MG: 500 TABLET, FILM COATED, EXTENDED RELEASE ORAL at 09:36

## 2018-09-15 RX ADMIN — HYDROCODONE BITARTRATE AND ACETAMINOPHEN 2 TABLET: 5; 325 TABLET ORAL at 13:20

## 2018-09-15 RX ADMIN — METOPROLOL TARTRATE 100 MG: 50 TABLET ORAL at 18:05

## 2018-09-15 RX ADMIN — METFORMIN HYDROCHLORIDE 500 MG: 500 TABLET ORAL at 09:36

## 2018-09-15 RX ADMIN — RANOLAZINE 500 MG: 500 TABLET, FILM COATED, EXTENDED RELEASE ORAL at 18:05

## 2018-09-15 RX ADMIN — METFORMIN HYDROCHLORIDE 500 MG: 500 TABLET ORAL at 18:05

## 2018-09-15 RX ADMIN — METOPROLOL TARTRATE 100 MG: 50 TABLET ORAL at 09:36

## 2018-09-15 RX ADMIN — TAMSULOSIN HYDROCHLORIDE 0.4 MG: 0.4 CAPSULE ORAL at 21:38

## 2018-09-15 RX ADMIN — ASPIRIN 81 MG: 81 TABLET, COATED ORAL at 09:36

## 2018-09-15 RX ADMIN — HYDROCODONE BITARTRATE AND ACETAMINOPHEN 2 TABLET: 5; 325 TABLET ORAL at 21:57

## 2018-09-15 RX ADMIN — ATORVASTATIN CALCIUM 40 MG: 40 TABLET, FILM COATED ORAL at 21:38

## 2018-09-15 RX ADMIN — RIVAROXABAN 20 MG: 20 TABLET, FILM COATED ORAL at 18:05

## 2018-09-15 NOTE — ROUTINE PROCESS
1800 - Patient complained chest \"heaviness\" and \"throbbing\", Vital signs 141/63 and pulse 135, O2 Sat 100%, called Dr. Michele Arreguin, and received callback stating give scheduled prescribed morning Metroprol 100 mg now, Cardizem IR 30 mg now, and get an EKG of the patient. [address] - Dr. Michele Arreguin called back and read back EKG and told patient no longer complaining of chest pain and pulse is 95 now. Dr. Michele Arreguin states continue to monitor. Will continue to monitor.

## 2018-09-15 NOTE — PROGRESS NOTES
conducted an initial consultation and Spiritual Assessment for Dylan King, who is a 70 y.o.,male. Patients Primary Language is: [place]. According to the patients EMR Zoroastrian Affiliation is: Angeles Gonzalez. The reason the Patient came to the hospital is:   Patient Active Problem List    Diagnosis Date Noted    Normal cardiac stress test 09/09/2018    Paroxysmal A-fib ([facility] 09/07/2018    Microcytic anemia 09/07/2018    DM (diabetes mellitus) ([facility] 09/07/2018    HTN (hypertension) 09/07/2018    CAD (coronary artery disease) 09/07/2018    Hiccup 09/07/2018    Ileus ([facility] 09/07/2018    Urine retention 09/07/2018    Lumbar stenosis 09/05/2018        The  provided the following Interventions:  Initiated a relationship of care and support. Explored issues of colton, belief, spirituality and Episcopalian/ritual needs while hospitalized. Listened empathically. Provided information about Spiritual Care Services. Offered prayer and assurance of continued prayers on patient's behalf. Chart reviewed. The following outcomes were achieved:  Patient shared limited information about both their medical narrative and spiritual journey/beliefs. Patient processed feeling about current hospitalization. Patient expressed gratitude for 's visit. Assessment:  Patient does not have any Episcopalian/cultural needs that will affect patients preferences in health care. There are no further spiritual or Episcopalian issues which require intervention at this time. Plan:  Chaplains will continue to follow and will provide pastoral care on an as needed/requested basis.  recommends bedside caregivers page  on duty if patient shows signs of acute spiritual or emotional distress. Dilan Gutierrez M.Div.   [facility]  [phone]

## 2018-09-15 NOTE — ROUTINE PROCESS
Dr. Phebe Hashimoto called regarding verifying medications. When informed patient is diabetic and has no insulin orders Dr. Phebe Hashimoto stated do not input any insulins or accuchecks on patient.

## 2018-09-15 NOTE — ROUTINE PROCESS
Bedside and Verbal shift change report given to Georgette Akins RN (oncoming nurse) by José Miguel Becker LPN (offgoing nurse). Report included the following information SBAR, Kardex and MAR.

## 2018-09-15 NOTE — ROUTINE PROCESS
Bedside and Verbal shift change report given to [address] [place] [address] Drive[address] (oncoming nurse) by Zachary Dasilva RN (offgoing nurse). Report included the following information SBAR, Kardex and MAR.

## 2018-09-15 NOTE — PROGRESS NOTES
Pt called this writer and stated that he needed his insulin, I attempted to explain to the pt that Dr. Gennaro Art stated that he did not want any accuchecks or insulin ordered, pt stated that he was going to call his wife to come and get him and that he was upset because he had not gotten his insulin, he stated that if he had known that he was not going to get any insulin tonite, he would not have eaten any dinner  0020 Dr. Gennaro Art paged and returned called and again he stated that he did not want the pt on any insulin or accuchecks, I updated Dr. Gennaro Art on the pt threatening to go home d/t not receiving his insulin, Dr. Gennaro Art stated that he would come and talk to the patient tomorrow  0025 Pt updated on Dr. Gennaro Art orders

## 2018-09-16 LAB
ATRIAL RATE: 62 BPM
CALCULATED P AXIS, ECG09: 36 DEGREES
CALCULATED R AXIS, ECG10: -5 DEGREES
CALCULATED T AXIS, ECG11: 32 DEGREES
DIAGNOSIS, 93000: NORMAL
GLUCOSE BLD STRIP.AUTO-MCNC: 145 MG/DL (ref 70–110)
GLUCOSE BLD STRIP.AUTO-MCNC: 152 MG/DL (ref 70–110)
GLUCOSE BLD STRIP.AUTO-MCNC: 188 MG/DL (ref 70–110)
GLUCOSE BLD STRIP.AUTO-MCNC: 192 MG/DL (ref 70–110)
P-R INTERVAL, ECG05: 156 MS
Q-T INTERVAL, ECG07: 454 MS
QRS DURATION, ECG06: 106 MS
QTC CALCULATION (BEZET), ECG08: 460 MS
VENTRICULAR RATE, ECG03: 62 BPM

## 2018-09-16 PROCEDURE — 74011636637 HC RX REV CODE- 636/637: Performed by: INTERNAL MEDICINE

## 2018-09-16 PROCEDURE — 82962 GLUCOSE BLOOD TEST: CPT

## 2018-09-16 PROCEDURE — 74011250637 HC RX REV CODE- 250/637: Performed by: INTERNAL MEDICINE

## 2018-09-16 RX ADMIN — RANOLAZINE 500 MG: 500 TABLET, FILM COATED, EXTENDED RELEASE ORAL at 08:57

## 2018-09-16 RX ADMIN — RANOLAZINE 500 MG: 500 TABLET, FILM COATED, EXTENDED RELEASE ORAL at 17:48

## 2018-09-16 RX ADMIN — METFORMIN HYDROCHLORIDE 500 MG: 500 TABLET ORAL at 17:48

## 2018-09-16 RX ADMIN — METOPROLOL TARTRATE 100 MG: 50 TABLET ORAL at 08:57

## 2018-09-16 RX ADMIN — INSULIN LISPRO 2 UNITS: 100 INJECTION, SOLUTION INTRAVENOUS; SUBCUTANEOUS at 11:44

## 2018-09-16 RX ADMIN — HYDROCODONE BITARTRATE AND ACETAMINOPHEN 2 TABLET: 5; 325 TABLET ORAL at 18:38

## 2018-09-16 RX ADMIN — METOPROLOL TARTRATE 100 MG: 50 TABLET ORAL at 17:48

## 2018-09-16 RX ADMIN — ATORVASTATIN CALCIUM 40 MG: 40 TABLET, FILM COATED ORAL at 21:46

## 2018-09-16 RX ADMIN — RIVAROXABAN 20 MG: 20 TABLET, FILM COATED ORAL at 17:48

## 2018-09-16 RX ADMIN — INSULIN LISPRO 2 UNITS: 100 INJECTION, SOLUTION INTRAVENOUS; SUBCUTANEOUS at 17:49

## 2018-09-16 RX ADMIN — TAMSULOSIN HYDROCHLORIDE 0.4 MG: 0.4 CAPSULE ORAL at 21:46

## 2018-09-16 RX ADMIN — HYDROCODONE BITARTRATE AND ACETAMINOPHEN 2 TABLET: 5; 325 TABLET ORAL at 10:00

## 2018-09-16 RX ADMIN — ASPIRIN 81 MG: 81 TABLET, COATED ORAL at 08:57

## 2018-09-16 RX ADMIN — METFORMIN HYDROCHLORIDE 500 MG: 500 TABLET ORAL at 08:57

## 2018-09-16 RX ADMIN — INSULIN LISPRO 2 UNITS: 100 INJECTION, SOLUTION INTRAVENOUS; SUBCUTANEOUS at 08:58

## 2018-09-16 RX ADMIN — DILTIAZEM HYDROCHLORIDE 180 MG: 180 CAPSULE, EXTENDED RELEASE ORAL at 08:57

## 2018-09-16 NOTE — ROUTINE PROCESS
Wife brought food from home , wife made aware of diabetic diet and increasing carbs will increase blood sugar, patient c/o bed deflates with whole in center, inflated mattress, wife states bed up stairs was better wants a different kind of bed. Alexander Brenner and wife made aware that different kind of bed not available at this time.

## 2018-09-16 NOTE — PROGRESS NOTES
Nutrition initial assessment- [facility]/Plan of care      RECOMMENDATIONS:     1. Consistent CHO Diet  2. Monitor weight, labs and PO intake  3. RD to follow     GOALS:     1. PO intake meets >75% of protein/calorie needs by 9/23  2. Weight Maintenance (+/- 1-2) by 9/23       ASSESSMENT:     Weight status is classified as obese per BMI of 32.7. Variable PO intake. Implemented food preferences for optimal PO intake. Labs noted. BG range (188-206) over the past 24 hours. Nutrition recommendations listed. RD to follow. Nutrition Diagnoses:   Obesity related to excessive energy intake PTA as evidenced by a BMI of 32.7. Nutrition Risk:  [] High  [] Moderate [x]  Low    SUBJECTIVE/OBJECTIVE:     Transferred from [address] [place] [address] to [facility] on 9/14. Pt admitted for lumbar stenosis. PMHx including CAD, HTN, DM, Afib and Meniere's disease. S/P lumbar decompression and spinal fusion on 9/5. Denies having any food allergies or problems chewing/swallowing. States UBW of 190 lb. Stated appetite is improving here in hospital and was eating well at home. Observed 50% intake of lunch meal during visit. Does not want nutritional supplements at this time. States he doesn't like to eat a lot of \"starchy\" foods and stated food preferences. Discussed preferences with CA to be implemented. Encouraged intake and will monitor.      Information Obtained from:    [x] Chart Review   [x] Patient   [x] Family/Caregiver   [] Nurse/Physician   [] Interdisciplinary Meeting/Rounds    Diet: Consistent CHO  Medications: [x] Reviewed    Allergies: [x] Reviewed   Patient Active Problem List   Diagnosis Code    Lumbar stenosis M48.061    Paroxysmal A-fib (HCC) I48.0    Microcytic anemia D50.9    DM (diabetes mellitus) ([facility] [facility] E11.9    HTN (hypertension) I10    CAD (coronary artery disease) I25.10    Hiccup R06.6    Ileus ([address] K56.7    Urine retention R33.9    Normal cardiac stress test Z13.6       Past Medical History:   Diagnosis Date    Atrial fibrillation ([facility]     CAD (coronary artery disease)     Diabetes ([facility]     Hypertension     Meniere's disease       Labs:    Lab Results   Component Value Date/Time    Sodium 139 09/13/2018 03:00 AM    Potassium 4.1 09/13/2018 03:00 AM    Chloride 103 09/13/2018 03:00 AM    CO2 28 09/13/2018 03:00 AM    Anion gap 8 09/13/2018 03:00 AM    Glucose 209 (H) 09/13/2018 03:00 AM    BUN 11 09/13/2018 03:00 AM    Creatinine 1.12 09/13/2018 03:00 AM    Calcium 7.8 (L) 09/13/2018 03:00 AM    Magnesium 1.4 (L) 12/04/2010 04:05 AM    Albumin 2.6 (L) 09/08/2018 04:00 AM     Anthropometrics: BMI (calculated): 32.7  Last 3 Recorded Weights in this Encounter    09/14/18 1726   Weight: 92 kg (202 lb 12.8 oz)      Ht Readings from Last 1 Encounters:   09/14/18 5' 6\" (1.676 m)     Weight Metrics 9/14/2018 9/13/2018 9/5/2018   Weight 202 lb 12.8 oz 208 lb -   BMI 32.73 kg/m2 - 33.57 kg/m2     No data found. IBW: 142 lb %IBW: 143%    [x] Variable weights  [] Weight Gain    [] Weight Stable    Estimated Nutrition Needs: [x] MSJ  [] Other:  Calories: 9808-3725 Kcal Based on:   [x] Actual BW    Protein:    g Based on:   [x] Actual BW    Fluid:       2300 ml Based on:   [x] Actual BW      [x] No Cultural, Congregational or ethnic dietary need identified.     [] Cultural, Congregational and ethnic food preferences identified and addressed     Wt Status:  [] Normal (18.6 - 24.9) [] Underweight (<18.5) [] Overweight (25 - 29.9) [x] Mild Obesity (30 - 34.9)  [] Moderate Obesity (35 - 39.9) [] Morbid Obesity (40+)     Nutrition Problems Identified:   [x] Suboptimal PO intake (improving)  [] Food Allergies  [] Difficulty chewing/swallowing/poor dentition  [] Constipation/Diarrhea   [] Nausea/Vomiting   [] None  [] Other:     Plan:   [x] Therapeutic Diet  [x]  Obtained/adjusted food preferences/tolerances and/or snacks options   []  Supplements added   [] Occupational therapy following for feeding techniques  []  HS snack added   []  Modify diet texture   []  Modify diet for food allergies   []  Educate patient   []  Assist with menu selection   [x]  Monitor PO intake on meal rounds   [x]  Continue inpatient monitoring and intervention   [x]  Participate in discharge planning/Interdisciplinary rounds/Team meetings   []  Other:     Education Needs:   [] Not appropriate for teaching at this time due to:   [x] Identified and addressed    Nutrition Monitoring and Evaluation:  [x] Continue ongoing monitoring and intervention  [] Anjana Penaloza

## 2018-09-16 NOTE — PROGRESS NOTES
Dietary Orders:     ?    Regular     X    Diabetic:      ?    Cardiac:     ?    Other:       ?    Tube feeding     ? IV fluids for hydration     ? Fluid restrictions:     Residents dietary preferences:    No fruit or juices/ginger ale; prefers 2 V-8's with each meal.     Reason for modified diet:  Hx of diabetes; A1c of 6.9%     Dietary Risks      ? Weight loss      ? Swallowing problems      ? Chewing problems      ? Missing teeth/poor dentition      ? Edentulous      ? Mouth pain/discomfort         X      Other: Healing from recent surgery    Residents dietary goal:     X      Maintain current weight     ? Prevent weight loss     ? Other   Dietary interventions     ? Eats in dining room     ? Eats in room     ? Dentures/partials     ? Specialty utensils or devices:     X     Other: Implemented food preferences for optimal PO intake.

## 2018-09-16 NOTE — ROUTINE PROCESS
Bedside and Verbal shift change report given to TRISTON LYNRN (oncoming nurse) by Catalina Murrieta RN (offgoing nurse). Report included the following information SBAR, Kardex and MAR. QH visual checks and 24H chart checks

## 2018-09-17 LAB
GLUCOSE BLD STRIP.AUTO-MCNC: 151 MG/DL (ref 70–110)
GLUCOSE BLD STRIP.AUTO-MCNC: 163 MG/DL (ref 70–110)
GLUCOSE BLD STRIP.AUTO-MCNC: 183 MG/DL (ref 70–110)
GLUCOSE BLD STRIP.AUTO-MCNC: 190 MG/DL (ref 70–110)

## 2018-09-17 PROCEDURE — 82962 GLUCOSE BLOOD TEST: CPT

## 2018-09-17 PROCEDURE — 74011250637 HC RX REV CODE- 250/637: Performed by: INTERNAL MEDICINE

## 2018-09-17 PROCEDURE — 74011636637 HC RX REV CODE- 636/637: Performed by: INTERNAL MEDICINE

## 2018-09-17 RX ORDER — INSULIN GLARGINE 100 [IU]/ML
4 INJECTION, SOLUTION SUBCUTANEOUS DAILY
Status: DISCONTINUED | OUTPATIENT
Start: 2018-09-18 | End: 2018-09-18

## 2018-09-17 RX ADMIN — INSULIN LISPRO 2 UNITS: 100 INJECTION, SOLUTION INTRAVENOUS; SUBCUTANEOUS at 17:46

## 2018-09-17 RX ADMIN — RANOLAZINE 500 MG: 500 TABLET, FILM COATED, EXTENDED RELEASE ORAL at 09:43

## 2018-09-17 RX ADMIN — ASPIRIN 81 MG: 81 TABLET, COATED ORAL at 09:43

## 2018-09-17 RX ADMIN — METOPROLOL TARTRATE 100 MG: 50 TABLET ORAL at 09:43

## 2018-09-17 RX ADMIN — HYDROCODONE BITARTRATE AND ACETAMINOPHEN 2 TABLET: 5; 325 TABLET ORAL at 02:51

## 2018-09-17 RX ADMIN — METOPROLOL TARTRATE 100 MG: 50 TABLET ORAL at 17:42

## 2018-09-17 RX ADMIN — HYDROCODONE BITARTRATE AND ACETAMINOPHEN 1 TABLET: 5; 325 TABLET ORAL at 14:29

## 2018-09-17 RX ADMIN — RIVAROXABAN 20 MG: 20 TABLET, FILM COATED ORAL at 17:44

## 2018-09-17 RX ADMIN — HYDROCODONE BITARTRATE AND ACETAMINOPHEN 2 TABLET: 5; 325 TABLET ORAL at 21:50

## 2018-09-17 RX ADMIN — INSULIN LISPRO 2 UNITS: 100 INJECTION, SOLUTION INTRAVENOUS; SUBCUTANEOUS at 21:51

## 2018-09-17 RX ADMIN — RANOLAZINE 500 MG: 500 TABLET, FILM COATED, EXTENDED RELEASE ORAL at 17:44

## 2018-09-17 RX ADMIN — MAGNESIUM HYDROXIDE 30 ML: 400 SUSPENSION ORAL at 17:44

## 2018-09-17 RX ADMIN — ACETAMINOPHEN 650 MG: 325 TABLET, FILM COATED ORAL at 09:42

## 2018-09-17 RX ADMIN — TAMSULOSIN HYDROCHLORIDE 0.4 MG: 0.4 CAPSULE ORAL at 21:49

## 2018-09-17 RX ADMIN — METFORMIN HYDROCHLORIDE 500 MG: 500 TABLET ORAL at 17:44

## 2018-09-17 RX ADMIN — INSULIN LISPRO 2 UNITS: 100 INJECTION, SOLUTION INTRAVENOUS; SUBCUTANEOUS at 09:43

## 2018-09-17 RX ADMIN — DILTIAZEM HYDROCHLORIDE 180 MG: 180 CAPSULE, EXTENDED RELEASE ORAL at 11:50

## 2018-09-17 RX ADMIN — INSULIN LISPRO 2 UNITS: 100 INJECTION, SOLUTION INTRAVENOUS; SUBCUTANEOUS at 11:30

## 2018-09-17 RX ADMIN — METFORMIN HYDROCHLORIDE 500 MG: 500 TABLET ORAL at 09:43

## 2018-09-17 RX ADMIN — ATORVASTATIN CALCIUM 40 MG: 40 TABLET, FILM COATED ORAL at 21:49

## 2018-09-17 NOTE — PROGRESS NOTES
The patient was offered 1:1 activities with the  on September 17, 2018. The patient asked to play cards during this time. The patient engaged in conversation with the  and was successfully able to play cards. The  will continue to offer the patient group and 1:1 activities.

## 2018-09-17 NOTE — ROUTINE PROCESS
Bedside and verbal shift report given to Becki (oncoming nurse) by Carlos Soliman LPN (off going nurse). Report included SBAR, MAR and Kardex.

## 2018-09-17 NOTE — ROUTINE PROCESS
Bedside and verbal shift report given to ROBERT Garrett LPN (oncoming nurse) by ANEESH Nicholas LPN (off going nurse). Report included SBAR, MAR and Kardex.

## 2018-09-17 NOTE — PROGRESS NOTES
Problem: Mobility Impaired (Adult and Pediatric)  Goal: *Acute Goals and Plan of Care (Insert Text)  Physical Therapy Goals  Short term goals: Initiated 9/17/2018 and to be accomplished within 7-14 day(s)  1. Patient will follow 90% of commands to maximize safety and active participation in mobility training. 2.  Patient will move from supine to sit and sit to supine  in bed with supervision/set-up. 3.  Patient will perform sit to stand with supervision/set-up. 4.  Patient will transfer from bed to chair and chair to bed with supervision/set-up using the least restrictive device. 5.  Patient will ambulate with supervision/set-up for 250 feet with the least restrictive device. 6.  Patient will ascend/descend 4 stairs with handrail(s) with supervision/set-up. 7. Patient will verbalize 3/3 lumbar precautions. 8. Patient will demonstrate compliance with lumbar precautions greater than 90% of session. 9. Patient will don/doff lumbar brace with independence to maximize safety for mobility  10. Patient will demonstrate appropriate use of incentive spirometer to aid in pulomnary compliance for mobility. Long term goals:  Initiated 9/17/2018 and to be accomplished within 14-21 day(s)  1. Patient will move from supine to sit and sit to supine  in bed with modified independence. 2.  Patient will transfer from bed to chair and chair to bed with modified independence using the least restrictive device. 3.  Patient will perform sit to stand with modified independence. 4.  Patient will ambulate with modified independence for 250 feet with the least restrictive device. 5.  Patient will ascend/descend 10 stairs with handrail(s) with modified independence. 6.  Patient will negotiate obstacles with ambulation with modified independence. Outcome: Progressing Towards Goal  PHYSICAL THERAPY: Initial Assessment   SKILLED NURSING FACILITY: Self-pay: Hospital Day: 4     Patient: Claudine Beasley (70 y.o. male)    Date: 9/17/2018  Primary Diagnosis: spinal stenosis   Precautions: Spinal; Fall  PLOF: Amb with cane    ASSESSMENT :  Patient requires between minimal assistance/contact guard assist and supervision/set-up for bed mobility, transfers and ambulation. Seated in wheelchair upon entry; lumbar brace not donned. Educated on role of PT, plan of care, lumbar precautions, and role of lumbar brace. Donned lumbar brace with mod A. Supervision for sit to stand from ww. Amb 40ft with supervision and ww. Complete transfer to elevated toilet seat with supervision. Amb 40ft to return to chair. Completed hospital gown change with min A in sitting. Redonned lumbar brace. Supervision for sit to stand and amb 75ft with ww. Cues for upright posture with flexion at trunk and rounded shoulders; minimal to no adjustments to posture . Supervision for w/c mobility remainder of distance to PT gym. Completed seated BLE exercises as noted below. Returned to bed at end of session with min A. Non-compliant with lumbar precautions for sit to stand despite verbal and tactile cues. Decreased safety awareness with transfers and posture for amb with ww. All needs in reach. RN aware of back pain at end of session. Vitals post mobility; /75, O2 saturation 98%, HR 65. Patient presents with deficits in:  Bed Mobility, Transfers, Gait, Strength, Balance, Home Exercise Program, Stairs and Precautions    Patient will benefit from skilled intervention to address the above impairments.   Patients rehabilitation potential is considered to be Good  Factors which may influence rehabilitation potential include:   []         None noted  []         Mental ability/status  [x]         Medical condition  []         Home/family situation and support systems  []         Safety awareness  [x]         Pain tolerance/management  []         Other:      PLAN :  Recommendations and Planned Interventions:  [x]           Bed Mobility Training             [x] Neuromuscular Re-Education  [x]           Transfer Training                   []    Orthotic/Prosthetic Training  [x]           Gait Training                          []    Modalities  [x]           Therapeutic Exercises          []    Edema Management/Control  [x]           Therapeutic Activities            [x]    Patient and Family Training/Education  []           Other (comment):      EDUCATION:   Education:  Patient was educated on the following topics: Bed mobility, transfers, ADLs, balance, amb, safety, exercise, role of PT, plan of care, cognition, skin integrity, vitals      Barriers to Learning/Limitations: yes;  language  Compensate with: visual, verbal, tactile, kinesthetic cues/model  Frequency/Duration: Patient will be followed by physical therapy 4-7 times a week to address goals. Discharge Recommendations: None  Further Equipment Recommendations for Discharge: rolling walker; has     SUBJECTIVE:   Patient stated I am weak.     OBJECTIVE DATA SUMMARY:     Past Medical History:   Diagnosis Date    At[facility] [address]     CAD (coronary artery disease)     Diabetes ([facility] [address]     Hypertension     Meniere's disease      Past Surgical History:   Procedure Laterality Date    HX HEART CATHETERIZATION      Stent    HX ORTHOPAEDIC      cervical sx   Eval Complexity: History: MEDIUM  Complexity : 1-2 comorbidities / personal factors will impact the outcome/ POC Exam:MEDIUM Complexity : 3 Standardized tests and measures addressing body structure, function, activity limitation and / or participation in recreation  Presentation: MEDIUM Complexity : Evolving with changing characteristics  Clinical Decision Making:Medium Complexity [facility] Standing Balance Scale 2/5 Overall Complexity:MEDIUM    G CODES:Mobility [id number] Current  CL= 60-79%   Goal  CI= 1-19%.   The severity rating is based on the Other [facility] Balance Scale 2/5    [facility] Balance Scale 2/5  0: Pt performs 25% or less of standing activity (Max assist) CN, 100% impaired. 1: Pt supports self with upper extremities but requires therapist assistance. Pt performs 25-50% of effort (Mod assist) CM, 80% to <100% impaired. 1+: Pt supports self with upper extremities but requires therapist assistance. Pt performs >50% effort. (Min assist). CL, 60% to <80% impaired. 2: Pt supports self independently with both upper extremities (walker, crutches, parallel bars). CL, 60% to <80% impaired. 2+: Pt support self independently with 1 upper extremity (cane, crutch, 1 parallel bar). CK, 40% to <60% impaired. 3: Pt stands without upper extremity support for up to 30 seconds. CK, 40% to <60% impaired. 3+: Pt stands without upper extremity support for 30 seconds or greater. CJ, 20% to <40% impaired. 4: Pt independently moves and returns center of gravity 1-2 inches in one plane. CJ, 20% to <40% impaired. 4+: Pt independently moves and returns center of gravity 1-2 inches in multiple planes. CI, 1% to <20% impaired. 5: Pt independently moves and returns center of gravity in all planes greater than 2 inches. CH, 0% impaired. Prior Level of Function/Home Situation: Amb with cane  Home Situation  Home Environment: Private residence  # Steps to Enter: 5  Rails to Enter: Yes  Hand Rails : Bilateral  One/Two Story Residence: Two story  Interior Rails: Left  Living Alone: No  Support Systems: Spouse/Significant Other/Partner  Patient Expects to be Discharged to[de-identified] Private residence  Current DME Used/Available at Home: Pradeep Push, straight, Walker, rolling  Critical Behavior:  Neurologic State: Alert; Appropriate for age  Orientation Level: Oriented X4  Cognition: Follows commands  Psychosocial  Patient Behaviors: Calm; Cooperative  Family  Behaviors: Cooperative    Manual Muscle Testing (LE)         R     L    Hip Flexion:   4/5  4/5  Knee EXT:   4/5  4/5  Knee FLEX:   4-/5  4-/5  Ankle DF:   3+/5  4/5  _________________________________________________   Tone : BLE normal  Sensation: Intact to light touch BLE  Range Of Motion: BLE AROM WFL    Functional Mobility:      Functional Status      Indep   (I)   Mod I   Super-vision   Min A   Mod A   Max A   Total A   Assist x2 Verbal cues Additional time Not tested   Comments   Rolling []  []  [] [x]    []    []  []  [] [] [] []    Supine to sit []  []  [] []  []  []  []  [] [] [] [x] Seated in chair   Sit to supine []  []  [] [x]  []  []  []  [] [] [] [] Non-compliant with lumbar precautions   Sit to stand []  []  [x] []  []  []  []  [] [] [] []    Stand to sit []  []  [x] []  []  []  []  [] [] [] []    Bed to chair transfers []  []  [] []  []  []  []  [] [] [] [x]        Balance    Good   Fair   Poor   Unable   Not tested   Comments   Sitting static [x]  []  []  []  []    Sitting dynamic [x]  []  []  []  []    Standing static []  [x]  []  []  []    Standing dynamic []  [x]  []  []  []      Mobility/Gait:   Level of Assistance: Supervision  Assistive Device: brace/splint and rolling walker  Distance Ambulated: 40 feet, 40 feet, 75 feet         Therapeutic Exercises:   Seated exercises BLE x10 Ankle pumps, knee flexion, hip extension 2#  Sit to stand x5  W/c mobility 120ft     Pain:  Pre treatment pain level: 10  Post treatment pain level: 10  Pain Scale 1: Numeric (0 - 10)    Activity Tolerance:   Fair  Please refer to the flowsheet for vital signs taken during this treatment. After treatment:   []         Patient left in no apparent distress sitting up in chair  [x]         Patient left in no apparent distress in bed  [x]         Call bell left within reach  [x]         Nursing notified  []         Caregiver present  []         Bed alarm activated    COMMUNICATION/EDUCATION:   [x]         Fall prevention education was provided and the patient/caregiver indicated understanding.   [x]         Patient/family have participated as able in goal setting and plan of care. [x]         Patient/family agree to work toward stated goals and plan of care. []         Patient understands intent and goals of therapy, but is neutral about his/her participation. []         Patient is unable to participate in goal setting and plan of care.     Thank you for this referral.  Jarocho Thompson, PT

## 2018-09-17 NOTE — PROGRESS NOTES
The patient was offered a group activity of listening to music played by a guitarist on September 17, 2018. The patient declined this activity. The  will continue to offer the patient group and 1:1 activities and encourage him to participate.

## 2018-09-17 NOTE — PROGRESS NOTES
Problem: Self Care Deficits Care Plan (Adult)  Goal: *Acute Goals and Plan of Care (Insert Text)  OCCUPATIONAL THERAPY SHORT TERM GOALS    Initiated 9/17/2018 and to be accomplished within 2 Week(s)    1. Patient will perform Upper body ADL's without adaptive equipment with supervision/set-up. 2.  Patient will perform Lower body ADL's with adaptive equipment as needed with moderate assistance while adhering to back precautions. 3.  Patient will perform toileting task with moderate assistance with Good safety to reduce falls risk. 4.  Patient will perform functional transfers with Ben Elks and minimal assist.  5.  Patient will perform standing static/dynamic balance activities for improved ADL/IADL function with minimal assistance and Fair- balance and safety awareness. 6.  Patient will improve Barthel index scores to atleast 75/100 to improve functional mobility. OCCUPATIONAL THERAPY LONG TERM GOALS   Initiated 9/17/2018 and to be accomplished within 4 Week(s)    1. Patient will perform ADL's & IADLs with adaptive equipment as needed with modified independence. 2.  Patient will perform toileting task with modified independence with Good safety to reduce falls risk. 3.  Patient will perform all functional transfers utilizing least restrictive device and durable medical equipment as needed with modified independence and Good balance and safety awareness. 4.  Patient will perform standing static/dynamic activity for improved ADL/IADL function with modified independence and Good balance and safety awareness. 5.  Patient will improve Barthel index score to 95/100 to improve independence with mobility. Therapist: Abigail Watson    9/17/2018       Time Calculation: 39 mins  [facility]   Occupational Therapy EVALUATION      Patient:  Mani Schrader (70 y.o. male)            Start of Care Date: 9/17/2018                         Onset Date:  8/31/18  Referred by : Marshall Dumont MD         Primary Diagnosis: spinal stenosis       Treatment Diagnosis  Treatment Diagnosis: muscle weakness  Treatment Diagnosis 2: increased need for assist with self care      Patient is a 70 y.o. Male admitted to [facility] 8/3/18 for Surgical intervention for stenosis spondylosis s/p decompression L1-2, 2-5, posterolateral spine fusion flospine L4-5. Postop  W/ 3 vessel CAD, complications with abnormal myocardial perfusion w. Inferolateral ischemia & EF 65%. Patient unsafe to discharge home and transferred to [facility] and referred to skilled Occupational therapy in order to reach maximal functional potential for safe discharge home at [facility]. Precautions: Back, Spinal, Fall (TLSO when OOB)  Eval Complexity: History: HIGH Complexity : Extensive review of history including physical, cognitive and psychosocial history . History of present problem reveals HIGH Complexity :3+ comorbidities / personal factors will impact the outcome/ POC . Past Medical history includes:   Past Medical History:   Diagnosis Date    Atrial fibrillation ([address]     CAD (coronary artery disease)     Diabetes ([facility] [address]     Hypertension     Meniere's disease      Past Surgical History:   Procedure Laterality Date    HX HEART CATHETERIZATION      Stent    HX ORTHOPAEDIC      cervical sx      Cognitive Status:  Mental Status  Neurologic State: Drowsy; Appropriate for age  Orientation Level: Oriented X4  Cognition: Follows commands  Perception: Appears intact  Perseveration: No perseveration noted  Safety/Judgement: Fall prevention  Barriers to Learning/Limitations: None  Compensate with: visual, verbal, tactile, kinesthetic cues/model  Justification for Evaluation complexity:   HIGH Complexity : Patient presents with comorbidities that affect occupational performance. Signifigant modification of tasks or assistance (eg, physical or verbal) with assessment (s) is necessary to enable patient to complete evaluation .  Patient is referred to Skilled Occupational Therapy Services due to a functional decline in strength, balance, coordination, safety awareness and functional activity tolerance, which is inhibiting independence w/ self-care performance skills and functional mobility. Prior Level of Function/Home Situation:   Home Situation  Home Environment: Private residence  # Steps to Enter: 5  Rails to Enter: Yes  Hand Rails : Bilateral  One/Two Story Residence: Two story  Interior Rails: Left  Living Alone: No  Support Systems: Spouse/Significant Other/Partner  Patient Expects to be Discharged to[de-identified] Private residence  Current DME Used/Available at Home: Grab bars, Commode, bedside, Raised toilet seat, Shower chair, Walker, rolling  Tub or Shower Type: Tub/Shower combination (w/ grab bars and shower seat)  Level of Assistance received at Home: Prior to this current hospitalization, the patient was Mod I w/ ADLs, IADLs, functional mobility w/ RW or SPC, still driving and helping spouse with light housekeeping and meal prep. OBJECTIVE DATA SUMMARY:     Vital Signs:  Resting BP:  BP: 124/63  HR: Pulse (Heart Rate): 70     Pain:  Pain Screen  Pain Scale 1: Numeric (0 - 10)  Pain Intensity 1: 6  Pain Onset 1: acute  Pain Location 1: Back  Pain Orientation 1: Inner  Pain Description 1: Aching  Pain Intervention(s) 1: Medication (see MAR)  Patient Stated Pain Goal: 1  Pain Reassessment 1: Yes  Auditory:  Auditory  Auditory Impairment: None  Examination:   HIGH Complexity : 5 or more performance deficits relating to physical, cognitive , or psychosocial skils that result in activity limitations and / or participation restrictions; Tone and Sensation:  Tone: Normal (BUEs)              Sensation: Intact (BUEs)               Visual Perceptual:  Vision  Corrective Lenses: Glasses    Coordination:  Coordination  Fine Motor Skills-Upper: Left Intact; Right Intact  Gross Motor Skills-Upper: Left Intact; Right Intact  Coordination: Within functional limits (BUEs)  Fine Motor Skills-Upper: Left Intact; Right Intact    Gross Motor Skills-Upper: Left Intact; Right Intact  Bed Mobility:        Transfers:  Functional Transfers  Toilet Transfer :  (unable 2/2 drowsiness s/p p! meds and back p! 10/10)  Balance:  Balance  Sitting:  (unable 2/2 drowsiness s/p p! meds and back p! 10/10)  Standing:  (unable 2/2 drowsiness s/p p! meds and back p! 10/10)  Gross Assesment:  Gross Assessment  AROM: Within functional limits (BUEs)  Strength: Generally decreased, functional (BUEs MMT not performed 2/2 back precautions, grossly 3/5)  Coordination: Within functional limits (BUEs)  Tone: Normal (BUEs)  Sensation: Intact (BUEs)  ADL self care:     ADL Intervention:  Upper Body Bathing  Bathing Assistance: Contact guard assistance  Upper Body Dressing Assistance  Dressing Assistance: Contact guard assistance  Lower Body Bathing  Bathing Assistance: Maximum assistance  Toileting  Toileting Assistance: Maximum assistance  Grooming  Grooming Assistance: Stand-by assistance  Feeding  Feeding Assistance: Modified independent       Wheelchair Management:not assessed  Instrumental ADL's:not assessed      ASSESSMENT:   A clinical decision making of HIGH  complexity was conducted, which includes an analysis of the Occupational profile, standardized assessments, data and treatment options.                                             THE BARTHEL INDEX    ACTIVITY   SCORE   FEEDING  0=unable  5=needs help cutting,spreading butter,etc., or modified diet  10= independent   10   BATHING  0=dependent  5=independent (or in shower   0   GROOMING  0=needs help  5=independent face/hair/teeth/shaving (implements provided)   5   DRESSING  0=dependent  5=needs help but can do about half unaided  10=independent(including buttons, zips,laces etc.) 5   BOWELS  0=incontinent  5=occasional accident  10=continent   10   BLADDER  0=incontinent, or catheterized and unable to manage alone  5=occasional accident  10=continent   10 TOILET USE  0=dependent  5=needs some help, but can do something alone  10=independent (on and off, dressing, wiping)   5   TRANSFER (BED TO CHAIR AND BACK)  0=unable, no sitting balance  5=major help(one or two people,physical), can sit  10=minor help(verbal or physical)  15=independent   0   MOBILITY (ON LEVEL SURFACES)  0=immobile or <50 yards  5=wheelchair independent,including corners,>50 yards  10=walkes with help of one person (verbal or physical) >50 yards  15=independent(but may use any aid; for example, stick) >50 yards   0   STAIRS  0=unable  5=needs help (verbal, physical, carrying aid)  10=independent   0             TOTAL:             45/100     Additional comments:  Patient presents lying supine in bed, spouse at bedside and reports that pt is very drowsy, tired, weak s/p recent administration of pain meds and unable to participate in OOB activity due to this. Patient able to follow commands and A & O x 4, reports 10/10 pain in back p! And fear of attempt to have BM if he has to push thus causing more back p!-nursing/LaLinda notified and reports MOM to be administered to assist with stool softener-patient and spouse notified. Patient educated on role of OT and POC; patient verbalized understanding. Pt. Instructed on safety awareness with importance to utilize call bell to request assist with functional transfers to prevent falls, patient verbalized understanding and provided accurate demonstration. Dry erase communication board updated for accuracy w/ carryover for toileting: urinal and bed pan-nursing: Trevor wilksfiied 2/2 to patient level of drowsiness and pain.      TREATMENT PLAN : Rehab Potential : Excellent  Skilled Occupational Therapy Services may include:   [x] Self Care/Home Mgmt                    []Cognitive Perceptual Re-training                              [x] Adaptive Equip Training                 [x]Therapeutic Exercise                  []Sensory Integration []Community Work Integration  [x]Therapeutic Activities                     []Other/modalities  [x]Neuromuscular Re-education         [x] Wheelchair Mgmt/propulsion    [x]Patient/Family/Staff Instruction      :  [x]Orthotics/Prosthetic Fitting & training     Frequency/Duration: Patient will be followed by Occupational therapy for 1-2 times a day for a minimum of 3 days per week for 4 weeks to address goals. Discharge Recommendations: Home Health  Patient expected Discharge Location: [x]Private Residence  [] [facility]  []LTC  [] [address]     COMMUNICATION/EDUCATION:  Patient/Family Agreement with Treatment Plan :     [x]   OT Evaluation/POC has been reviewed with the ADAMES. [x]        Fall prevention education was provided and the patient/caregiver indicated understanding  [x]        Patient/family have participated as able in goal setting and plan of care. Patient/family agree to work toward stated goals and plan of care. []        Patient is unable to participate in goal setting and plan of care. Therapist will contact SW/POA. Treatment session:   Overall Complexity:HIGH  Evaluation:  40 mins. Treatment :   20 mins.     Occupational Therapist:   Yue Cortes          9/17/2018   Thank You for this referral.

## 2018-09-17 NOTE — PROGRESS NOTES
Verbal shift change report given to MEHREEN Crews (oncoming nurse) by Edy Chopra RN (offgoing nurse). Report included the following information SBAR, MAR and Recent Results.

## 2018-09-17 NOTE — PROGRESS NOTES
I have reviewed this patient's current medication list and recent laboratory results. At this time, I do not suggest any drug therapy adjustments or additional laboratory monitoring. Thank you,  Maritza REDDY  Ph. M. S.  9/17/2018

## 2018-09-17 NOTE — DIABETES MGMT
Glycemic Control:    Pt's glucose is now in the target range with discontinuation of steroids. Pt's BG controlled on metformin and corrective lispro. He has a poor appetite today, I observed his intake of the dinner meal ~200 calories. Encouraged pt's wife to bring home beverage items that may contribute to hyperglycemia,   made suggestions for alternate choices.  Michelet MARINELLI

## 2018-09-18 LAB
GLUCOSE BLD STRIP.AUTO-MCNC: 126 MG/DL (ref 70–110)
GLUCOSE BLD STRIP.AUTO-MCNC: 170 MG/DL (ref 70–110)
GLUCOSE BLD STRIP.AUTO-MCNC: 190 MG/DL (ref 70–110)
GLUCOSE BLD STRIP.AUTO-MCNC: 231 MG/DL (ref 70–110)

## 2018-09-18 PROCEDURE — 74011636637 HC RX REV CODE- 636/637: Performed by: INTERNAL MEDICINE

## 2018-09-18 PROCEDURE — 82962 GLUCOSE BLOOD TEST: CPT

## 2018-09-18 PROCEDURE — 74011250637 HC RX REV CODE- 250/637: Performed by: INTERNAL MEDICINE

## 2018-09-18 RX ORDER — FACIAL-BODY WIPES
10 EACH TOPICAL DAILY PRN
Status: DISCONTINUED | OUTPATIENT
Start: 2018-09-18 | End: 2018-10-04 | Stop reason: HOSPADM

## 2018-09-18 RX ORDER — INSULIN GLARGINE 100 [IU]/ML
8 INJECTION, SOLUTION SUBCUTANEOUS DAILY
Status: DISCONTINUED | OUTPATIENT
Start: 2018-09-19 | End: 2018-09-20

## 2018-09-18 RX ORDER — POLYETHYLENE GLYCOL 3350 17 G/17G
17 POWDER, FOR SOLUTION ORAL DAILY
Status: DISCONTINUED | OUTPATIENT
Start: 2018-09-19 | End: 2018-09-18

## 2018-09-18 RX ORDER — POLYETHYLENE GLYCOL 3350 17 G/17G
17 POWDER, FOR SOLUTION ORAL AS NEEDED
Status: DISCONTINUED | OUTPATIENT
Start: 2018-09-18 | End: 2018-10-04 | Stop reason: HOSPADM

## 2018-09-18 RX ORDER — NYSTATIN 100000 [USP'U]/G
POWDER TOPICAL 2 TIMES DAILY
Status: DISCONTINUED | OUTPATIENT
Start: 2018-09-18 | End: 2018-10-04 | Stop reason: HOSPADM

## 2018-09-18 RX ORDER — DOCUSATE SODIUM 100 MG/1
100 CAPSULE, LIQUID FILLED ORAL 2 TIMES DAILY
Status: DISCONTINUED | OUTPATIENT
Start: 2018-09-18 | End: 2018-10-04 | Stop reason: HOSPADM

## 2018-09-18 RX ADMIN — ATORVASTATIN CALCIUM 40 MG: 40 TABLET, FILM COATED ORAL at 21:44

## 2018-09-18 RX ADMIN — DILTIAZEM HYDROCHLORIDE 180 MG: 180 CAPSULE, EXTENDED RELEASE ORAL at 08:41

## 2018-09-18 RX ADMIN — INSULIN GLARGINE 4 UNITS: 100 INJECTION, SOLUTION SUBCUTANEOUS at 08:57

## 2018-09-18 RX ADMIN — TAMSULOSIN HYDROCHLORIDE 0.4 MG: 0.4 CAPSULE ORAL at 21:44

## 2018-09-18 RX ADMIN — DOCUSATE SODIUM 100 MG: 100 CAPSULE, LIQUID FILLED ORAL at 17:36

## 2018-09-18 RX ADMIN — ACETAMINOPHEN 650 MG: 325 TABLET, FILM COATED ORAL at 08:41

## 2018-09-18 RX ADMIN — METFORMIN HYDROCHLORIDE 500 MG: 500 TABLET ORAL at 08:41

## 2018-09-18 RX ADMIN — INSULIN LISPRO 6 UNITS: 100 INJECTION, SOLUTION INTRAVENOUS; SUBCUTANEOUS at 13:08

## 2018-09-18 RX ADMIN — METOPROLOL TARTRATE 100 MG: 50 TABLET ORAL at 17:36

## 2018-09-18 RX ADMIN — INSULIN LISPRO 2 UNITS: 100 INJECTION, SOLUTION INTRAVENOUS; SUBCUTANEOUS at 21:45

## 2018-09-18 RX ADMIN — RANOLAZINE 500 MG: 500 TABLET, FILM COATED, EXTENDED RELEASE ORAL at 17:36

## 2018-09-18 RX ADMIN — RANOLAZINE 500 MG: 500 TABLET, FILM COATED, EXTENDED RELEASE ORAL at 08:42

## 2018-09-18 RX ADMIN — INSULIN LISPRO 2 UNITS: 100 INJECTION, SOLUTION INTRAVENOUS; SUBCUTANEOUS at 08:43

## 2018-09-18 RX ADMIN — METFORMIN HYDROCHLORIDE 500 MG: 500 TABLET ORAL at 17:36

## 2018-09-18 RX ADMIN — RIVAROXABAN 20 MG: 20 TABLET, FILM COATED ORAL at 17:36

## 2018-09-18 RX ADMIN — NYSTATIN: 100000 POWDER TOPICAL at 22:04

## 2018-09-18 RX ADMIN — BISACODYL 10 MG: 10 SUPPOSITORY RECTAL at 21:44

## 2018-09-18 RX ADMIN — METOPROLOL TARTRATE 100 MG: 50 TABLET ORAL at 08:41

## 2018-09-18 RX ADMIN — ASPIRIN 81 MG: 81 TABLET, COATED ORAL at 08:42

## 2018-09-18 NOTE — ROUTINE PROCESS
Bedside and verbal shift report given to  Kelly (oncoming nurse) by Rose Castillo LPN (off going nurse). Report included SBAR, MAR and Kardex.

## 2018-09-18 NOTE — PROGRESS NOTES
Problem: Self Care Deficits Care Plan (Adult)  Goal: *Acute Goals and Plan of Care (Insert Text)  OCCUPATIONAL THERAPY SHORT TERM GOALS    Initiated 9/17/2018 and to be accomplished within 2 Week(s)    1. Patient will perform Upper body ADL's without adaptive equipment with supervision/set-up. 2.  Patient will perform Lower body ADL's with adaptive equipment as needed with moderate assistance while adhering to back precautions. 3.  Patient will perform toileting task with moderate assistance with Good safety to reduce falls risk. 4.  Patient will perform functional transfers with Shahab Alert and minimal assist.  5.  Patient will perform standing static/dynamic balance activities for improved ADL/IADL function with minimal assistance and Fair- balance and safety awareness. 6.  Patient will improve Barthel index scores to atleast 75/100 to improve functional mobility. OCCUPATIONAL THERAPY LONG TERM GOALS   Initiated 9/17/2018 and to be accomplished within 4 Week(s)    1. Patient will perform ADL's & IADLs with adaptive equipment as needed with modified independence. 2.  Patient will perform toileting task with modified independence with Good safety to reduce falls risk. 3.  Patient will perform all functional transfers utilizing least restrictive device and durable medical equipment as needed with modified independence and Good balance and safety awareness. 4.  Patient will perform standing static/dynamic activity for improved ADL/IADL function with modified independence and Good balance and safety awareness. 5.  Patient will improve Barthel index score to 95/100 to improve independence with mobility. Therapist: Chelsi Warren    9/17/2018       Time Calculation: 39 mins   [facility]   Occupational Therapy Daily Treatment note      Patient:  Erick Bhatt (4 y.o. male)       Date: 9/18/2018  Attending Physician: Jaci Masterson MD  Primary Diagnosis: spinal stenosis Treatment Diagnosis  Treatment Diagnosis: muscle weakness  Treatment Diagnosis 2: increased need for assist with self care    Precautions : Precautions at Admission: Back, Spinal, Fall (TLSO when OOB)  Vital Signs:  Vital Signs  Temp: 97.8 °F (36.6 °C)  Temp Source: Oral  Pulse (Heart Rate): 68  Resp Rate: 18  O2 Sat (%): 96 %  BP: 131/61  MAP (Calculated): 84  BP 1 Method: Automatic  BP 1 Location: Right arm  BP Patient Position: At rest     Cognitive Status:  Mental Status  Neurologic State: Alert  Orientation Level: Oriented X4  Cognition: Follows commands; Appropriate safety awareness; Appropriate for age attention/concentration; Appropriate decision making  Pain:  Pain Screen  Pain Scale 1: Numeric (0 - 10)  Pain Intensity 1: 5  Pain Onset 1: Acute  Pain Location 1: Back  Pain Orientation 1: Inner  Pain Description 1: Aching  Pain Intervention(s) 1: Medication (see MAR)  Patient Stated Pain Goal: 1  Pain Reassessment 1: Yes  Pain Scale 1: Numeric (0 - 10)  Gross Assessment:     Coordination:     Bed Mobility:     Transfers:  Functional Transfers  Sit to Stand: Contact guard assistance  Toilet Transfer : Contact guard assistance  Functional Transfers  Toilet Transfer : Contact guard assistance  Adaptive Equipment: Walker (comment);Grab bars (raised toilet seat)  Balance:  Balance  Sitting: With support  Standing: With support; Impaired  ADL Self Care:  Basic ADL  Type of Bath: Basin/Soap/Water  ADL Intervention: Second tx session: patient c/o 5/10 back pain following administering pain meds. Patient participated in toilet transfer: CGA w/ RW, patient did not have B/B movement & toilet tasks not addressed 2/2 patient not wearing pull up adult brief.  UB Bathe & dress: seated w/c level at sink w/ SBA, dep w/ back, UB dress: Min A doff & don hospital down & TLSO back brace, LB bathing: SBA anterior periarea seated w/c level at sinkside, Min A posterior perihygiene in stance w/ RW while adhering to back precautions - no twisting, LB dress: Max A w/ instruction for use of reacher & sock aide to don pull up adult brief and socks. Patient sitting up in w/c at end of tx session w/ back pain decreased to 4/10, call bell within reach. Upper Body Bathing  Bathing Assistance: Stand-by assistance (& dep w/ backj)  Position Performed:  (seated w/c level at sinkside)  Upper Body Dressing Assistance  Orthotics(Brace): Minimum assistance  Shirt simulation with hospital gown: Minimum  assistance  Lower Body Bathing  Bathing Assistance: Moderate assistance  Perineal  : Moderate assistance  Position Performed:  (seated & in stance w/ RW)  Toileting  Bladder Hygiene: Contact guard assistance  Grooming  Washing Face: Stand-by assistance (seated w/c level at sinkside)  Brushing Teeth: Stand-by assistance (seated w/c level at sinkside using spittoon )  Lower Body Dressing Assistance  Dressing Assistance: Maximum assistance  Protective Undergarmet: Maximum assistance  Socks: Maximum assistance  Adaptive Equipment Used: Reacher;Sock aid           Therapeutic Activities:  First tx session: patient c/o 10/10 pain, nursing/Gloria present and administered pain meds. Provided instruction for spinal/back precautions & dry erase/communication board updated to increase accuracy & carryover, patient able to read w/ glasses on. Patient/Caregiver Education:    Patient instructed for use of AE I.e. sock aide &  long handle reacher w/ instruction and demonstration provided for pull up adult brief over feet, doffing socks w/ reacher followed by set up and use for sock aide to don socks, patient requires ongoing repetition for practice. Issued reacher with demonstration and instruction for keeping closeby for item retrieval to adhere to back/spinal precautions.      ASSESSMENT:  Patient continues to demonstrate the need for skilled Occupational Therapy services to improve grooming, bathing, upper body dressing, lower body dressing, toileting and toilet transfer  Progression toward goals:  [x]      Improving appropriately and progressing toward goals  []      Improving slowly and progressing toward goals  []      Not making progress toward goals and plan of care will be adjusted     Treatment session: 65 minutes    Therapist:    Christophe Callahan,  9/18/2018

## 2018-09-18 NOTE — PROGRESS NOTES
Verbal shift change report given to MEHREEN Baig (oncoming nurse) by Suraj Higginbotham (offgoing nurse). Report included the following information SBAR, Intake/Output and Recent Results.

## 2018-09-18 NOTE — PROGRESS NOTES
Problem: Mobility Impaired (Adult and Pediatric)  Goal: *Acute Goals and Plan of Care (Insert Text)  Physical Therapy Goals  Short term goals: Initiated 9/17/2018, Revised 9/18/18 and to be accomplished within 1-2 weeks. 1.  Patient will move from supine to sit and sit to supine  in bed with supervision/set-up. 2.  Patient will perform sit to stand with supervision/set-up. 3.  Patient will transfer from bed to chair and chair to bed with supervision/set-up using the least restrictive device. 4.  Patient will ambulate with supervision/set-up for 250 feet with the least restrictive device. 5.  Patient will ascend/descend 4 stairs with handrail(s) with close supervision/set-up. 6. Patient will verbalize and demonstrate 3/3 lumbar precautions. 7. Patient will demonstrate compliance with lumbar precautions greater than 90% of session. 8. Patient will don/doff lumbar brace with independence to maximize safety for mobility     Long term goals:  Initiated 9/17/2018 and to be accomplished within 3-4 weeks. 1.  Patient will move from supine to sit and sit to supine  in bed with modified independence. 2.  Patient will transfer from bed to chair and chair to bed with modified independence using the least restrictive device. 3.  Patient will perform sit to stand with modified independence. 4.  Patient will ambulate with modified independence for 250 feet with the least restrictive device. 5.  Patient will ascend/descend 10 stairs with handrail(s) with supervision. Physical Therapist: Robin Flanagan, PT on 9/17/2018        [address] [place] [address]   physical Therapy Daily Treatment note      Patient:  Tom Bell (19 y.o. male)               Date: 9/18/2018    Physician: Trace Verma MD  Primary Diagnosis: spinal stenosis          Treatment Diagnosis  Treatment Diagnosis: muscle weakness  Treatment Diagnosis 2: increased need for assist with self care   Precautions: Back, Spinal, Fall (TLSO when OOB)  Vital Signs  Vital Signs  Temp: 97.8 °F (36.6 °C)  Temp Source: Oral  Pulse (Heart Rate): 68  Resp Rate: 18  O2 Sat (%): 96 %  BP: 131/61  MAP (Calculated): 84  BP 1 Method: Automatic  BP 1 Location: Right arm  BP Patient Position: At rest     Cognitive Status:  Mental Status  Neurologic State: Alert  Orientation Level: Oriented X4  Cognition: Follows commands; Appropriate safety awareness; Appropriate for age attention/concentration; Appropriate decision making  Pain  Pain Screen  Pain Scale 1: Numeric (0 - 10)  Pain Intensity 1: 5  Pain Onset 1: Acute  Pain Location 1: Back  Pain Orientation 1: Inner  Pain Description 1: Aching  Pain Intervention(s) 1: Medication (see MAR)  Patient Stated Pain Goal: 1  Pain Reassessment 1: Yes  Bed Mobility Training  Bed Mobility Training  Rolling: Contact guard assistance  Supine to Sit: Stand-by assistance  Scooting: Stand-by assistance  Balance  Sitting: With support  Sitting - Static: Fair (occasional) (+)  Sitting - Dynamic: Fair (occasional)  Standing: With support  Standing - Static: Fair  Standing - Dynamic : Fair  Transfer Training  Transfer Training  Sit to Stand: Contact guard assistance  Stand to Sit: Contact guard assistance  Bed to Chair: Contact guard assistance  Sit to Stand: Contact guard assistance  Gait Training  Gait  Base of Support: Center of gravity altered  Speed/Elena: Slow  Step Length: Left shortened;Right shortened  Gait Abnormalities: Decreased step clearance  Distance (ft): 42 Feet (ft)  Assistive Device: Gait belt;Walker, rolling     Gait Abnormalities: Decreased step clearance            With 2 turns. Therapeutic Exercise:    Upon initial presentation, pt was pleasant, described his delight in benavidez that he had received. When encouraged bed mobility, pt began to c/o 10/10 back pain \"in the bone\". Nurse notified and pain meds requested.  Pt then began to extensively complain and describe his lack of satisfaction with his current hospital bed, stating, \"I am in [place] and stuck with bad bed\" as well as \"When my wife arrives, she is going to take me to rehab in [place]. \" Therapist ensured that she would share his concerns over bad bed with rehab director and DON, pt still not satisfied, stating that he had been complaining about the bed and it had not yet been changed. When breakfast arrived, pt became agreeable to transferring from bed to w/c. Pt requested CNA stating, Taylor Yeung can handle me. \" Therapist educated on role of physical therapy and use of his muscles to move himself. Pt agreeable. Bed mobility with CGA, pt c/o pain reporting that therapist could not understand that scooting made him sit on himself. Sit to stand with CGA and additional time. Transfer to w/c with CGA with RW, very slow movements, pt crediting pain. Breakfast set-up, nurse presented to administer meds. Upon return, pt agreeable to participation, reporting pain at 8/10 as he only had Tylenol. Pt began speaking about bed again, multiple attempts of redirecting and ensuring that therapist had informed rehab director, pt finally agreeable to participation. Gait training x 42 ft using RW with CGA, very slow gait speed, discontinuous steps, limited by pain. Educated on completion of heel raises, toe raises for circulation, pt properly demonstrated x 10 reps each. Patient/Caregiver Education:   Pt /Caregiver Education on pain management for improved pain control, reminder written on [employer]. ASSESSMENT:  Patient continues to benefit from Skilled PT services to improve mobility, ambulation, balance. Progression toward goals:  []      Improving appropriately and progressing toward goals  [x]      Improving slowly and progressing toward goals  []      Not making progress toward goals and plan of care will be adjusted     Treatment session: 75 minutes.   Therapist:   Jose R Coffman, PT,          9/18/2018

## 2018-09-18 NOTE — ROUTINE PROCESS
Bedside and verbal shift report given to ROBERT Nath LPN (oncoming nurse) by ANEESH Cespedes LPN (off going nurse). Report included SBAR, MAR and Kardex.

## 2018-09-19 LAB
GLUCOSE BLD STRIP.AUTO-MCNC: 129 MG/DL (ref 70–110)
GLUCOSE BLD STRIP.AUTO-MCNC: 187 MG/DL (ref 70–110)
GLUCOSE BLD STRIP.AUTO-MCNC: 216 MG/DL (ref 70–110)
GLUCOSE BLD STRIP.AUTO-MCNC: 237 MG/DL (ref 70–110)
GLUCOSE BLD STRIP.AUTO-MCNC: 327 MG/DL (ref 70–110)
GLUCOSE BLD STRIP.AUTO-MCNC: 75 MG/DL (ref 70–110)

## 2018-09-19 PROCEDURE — 74011250637 HC RX REV CODE- 250/637: Performed by: INTERNAL MEDICINE

## 2018-09-19 PROCEDURE — 82962 GLUCOSE BLOOD TEST: CPT

## 2018-09-19 PROCEDURE — 74011636637 HC RX REV CODE- 636/637: Performed by: INTERNAL MEDICINE

## 2018-09-19 RX ADMIN — ATORVASTATIN CALCIUM 40 MG: 40 TABLET, FILM COATED ORAL at 21:47

## 2018-09-19 RX ADMIN — INSULIN LISPRO 2 UNITS: 100 INJECTION, SOLUTION INTRAVENOUS; SUBCUTANEOUS at 17:56

## 2018-09-19 RX ADMIN — METOPROLOL TARTRATE 100 MG: 50 TABLET ORAL at 17:53

## 2018-09-19 RX ADMIN — RIVAROXABAN 20 MG: 20 TABLET, FILM COATED ORAL at 17:53

## 2018-09-19 RX ADMIN — NYSTATIN: 100000 POWDER TOPICAL at 17:55

## 2018-09-19 RX ADMIN — INSULIN LISPRO 4 UNITS: 100 INJECTION, SOLUTION INTRAVENOUS; SUBCUTANEOUS at 11:30

## 2018-09-19 RX ADMIN — HYDROCODONE BITARTRATE AND ACETAMINOPHEN 2 TABLET: 5; 325 TABLET ORAL at 06:06

## 2018-09-19 RX ADMIN — TAMSULOSIN HYDROCHLORIDE 0.4 MG: 0.4 CAPSULE ORAL at 21:47

## 2018-09-19 RX ADMIN — METFORMIN HYDROCHLORIDE 500 MG: 500 TABLET ORAL at 17:53

## 2018-09-19 RX ADMIN — DOCUSATE SODIUM 100 MG: 100 CAPSULE, LIQUID FILLED ORAL at 17:53

## 2018-09-19 RX ADMIN — RANOLAZINE 500 MG: 500 TABLET, FILM COATED, EXTENDED RELEASE ORAL at 17:53

## 2018-09-19 NOTE — PROGRESS NOTES
conducted a Follow up consultation and Spiritual Assessment for Radha Gann, who is a 70 y.o.,male. The  provided the following Interventions:  Continued the relationship of care and support. Visited patient in room 3209. Listened empathically as patient and wife shared spiritual experience of presence of [place]oplis Lathe, as well as cultural differences between [phone] and [facility].  prayed with patient and wife, and assured them of continued prayer on patient's behalf. The following outcomes were achieved:  Patient and wife expressed gratitude for pastoral care visit and prayer. Assessment:  There are no further spiritual or Gnosticist issues which require Spiritual Care Services interventions at this time. Plan:  Chaplains will continue to follow and will provide pastoral care on an as needed/requested basis.  recommends bedside caregivers page  on duty if patient shows signs of acute spiritual or emotional distress.      [address]  [phone]

## 2018-09-19 NOTE — PROGRESS NOTES
Problem: Mobility Impaired (Adult and Pediatric)  Goal: *Acute Goals and Plan of Care (Insert Text)  Physical Therapy Goals  Short term goals: Initiated 9/17/2018, Revised 9/18/18 and to be accomplished within 1-2 weeks. 1.  Patient will move from supine to sit and sit to supine  in bed with supervision/set-up. 2.  Patient will perform sit to stand with supervision/set-up. 3.  Patient will transfer from bed to chair and chair to bed with supervision/set-up using the least restrictive device. 4.  Patient will ambulate with supervision/set-up for 250 feet with the least restrictive device. 5.  Patient will ascend/descend 4 stairs with right handrail(s), 15 stairs left handrail with close supervision/set-up. 6. Patient will verbalize and demonstrate 3/3 lumbar precautions. 7. Patient will demonstrate compliance with lumbar precautions greater than 90% of session. 8. Patient will don/doff lumbar brace with independence to maximize safety for mobility     Long term goals:  Initiated 9/17/2018 and to be accomplished within 3-4 weeks. 1.  Patient will move from supine to sit and sit to supine  in bed with modified independence. 2.  Patient will transfer from bed to chair and chair to bed with modified independence using the least restrictive device. 3.  Patient will perform sit to stand with modified independence. 4.  Patient will ambulate with modified independence for 250 feet with the least restrictive device. 5.  Patient will ascend/descend 4 stairs with right handrail, 15 stairs with left handrail(s) with supervision. Physical Therapist: Christine Arnold PT on 9/17/2018        [address] [place] [address]   physical Therapy Daily Treatment note      Patient:  Alisha Frye (14 y.o. male)               Date: 9/19/2018    Physician: Jass Holland MD  Primary Diagnosis: spinal stenosis          Treatment Diagnosis  Treatment Diagnosis: muscle weakness  Treatment Diagnosis 2: increased need for assist with self care   Precautions: Back, Spinal, Fall (TLSO when OOB)  Vital Signs       Cognitive Status:     Pain  Pain Screen  Pain Scale 1: Numeric (0 - 10)  Pain Intensity 1: 10  Patient Stated Pain Goal: 0  Bed Mobility Training     Balance  Sitting: With support  Sitting - Static: Fair (occasional) (+)  Sitting - Dynamic: Fair (occasional)  Standing: With support  Standing - Static: Fair  Standing - Dynamic : Fair  Transfer Training  Transfer Training  Sit to Stand: Contact guard assistance; Additional time  Stand to Sit: Contact guard assistance  Sit to Stand: Contact guard assistance; Additional time  Gait Training  Gait  Base of Support: Center of gravity altered  Speed/Elena: Slow  Step Length: Right shortened  Gait Abnormalities: Decreased step clearance  Ambulation - Level of Assistance: Contact guard assistance  Distance (ft): 97 Feet (ft)  Assistive Device: Gait belt;Walker, rolling     Gait Abnormalities: Decreased step clearance            With 4 turns. Therapeutic Exercise:    Upon presentation, pt initially refused gait training/ambulation. He described having diarrhea after experiencing constipation for days and having needed toileting x 5 times. Therapist is unsure if pt was explaining that this caused pain in R hip and top of R thigh or if it made this pain worse. Pain management education attempted, it does not appear that pt understands as he reports that he cannot request more pain meds or different pain as \"I would look like a drug addict. \" Therapist f/u with nurse/DON who reported that pt could have pain meds. Pt agreeable to gait training afterwards, completed as mentioned above, verbal cueing for more upright posture, closer proximity to RW, noted decreased R step length. Static standing balance x ~2 min x 2 reps, limited by increase in back pain to 10/10. Education on compliance with HEP, pt properly demonstrated heel raises and toe raises.     Patient/Caregiver Education:   Pt Celester Box Education on (see above). ASSESSMENT:  Patient continues to benefit from Skilled PT services to improve mobility, ambulation, balance. Progression toward goals:  []      Improving appropriately and progressing toward goals  [x]      Improving slowly and progressing toward goals  []      Not making progress toward goals and plan of care will be adjusted      Treatment session: 57 minutes, co-tx to maximize functional gains due to increased pain.    Therapist:   Go Ortiz, PT,          9/19/2018

## 2018-09-19 NOTE — ROUTINE PROCESS
All AM medications and PRN medications given by Humera Byrnes rn due to lack of computer access please refer to downtime MARs located in medical records to be used as future reference

## 2018-09-19 NOTE — ROUTINE PROCESS
Bedside and Verbal shift change report given to [address] [place] [address] (oncoming nurse) by claude neal (offgoing nurse). Report included the following information Kardex, MAR and Recent Results.

## 2018-09-19 NOTE — ROUTINE PROCESS
Written shift change report given by Jayna Florez RN (offgoing nurse). Report included the following information SBAR, Kardex and MAR.

## 2018-09-19 NOTE — ROUTINE PROCESS
Bedside and verbal shift report given to WALLY Scruggs (oncoming nurse) by ANEESH Husain LPN (off going nurse). Report included SBAR, MAR and Kardex.

## 2018-09-19 NOTE — PROGRESS NOTES
Problem: Self Care Deficits Care Plan (Adult)  Goal: *Acute Goals and Plan of Care (Insert Text)  OCCUPATIONAL THERAPY SHORT TERM GOALS    Initiated 9/17/2018 and to be accomplished within 2 Week(s)    1. Patient will perform Upper body ADL's without adaptive equipment with supervision/set-up. 2.  Patient will perform Lower body ADL's with adaptive equipment as needed with moderate assistance while adhering to back precautions. 3.  Patient will perform toileting task with moderate assistance with Good safety to reduce falls risk. 4.  Patient will perform functional transfers with [address] and minimal assist.  5.  Patient will perform standing static/dynamic balance activities for improved ADL/IADL function with minimal assistance and Fair- balance and safety awareness. 6.  Patient will improve Barthel index scores to atleast 75/100 to improve functional mobility. OCCUPATIONAL THERAPY LONG TERM GOALS   Initiated 9/17/2018 and to be accomplished within 4 Week(s)    1. Patient will perform ADL's & IADLs with adaptive equipment as needed with modified independence. 2.  Patient will perform toileting task with modified independence with Good safety to reduce falls risk. 3.  Patient will perform all functional transfers utilizing least restrictive device and durable medical equipment as needed with modified independence and Good balance and safety awareness. 4.  Patient will perform standing static/dynamic activity for improved ADL/IADL function with modified independence and Good balance and safety awareness. 5.  Patient will improve Barthel index score to 95/100 to improve independence with mobility. Therapist: Onelia Portillo    9/17/2018       Time Calculation: 39 mins   [facility]   Occupational Therapy Daily Treatment note      Patient:  Acacia Euceda (1 y.o. male)       Date: 9/19/2018  Attending Physician: Rupert Reveles MD  Primary Diagnosis: spinal stenosis Treatment Diagnosis  Treatment Diagnosis: muscle weakness  Treatment Diagnosis 2: increased need for assist with self care    Precautions : Precautions at Admission: Back, Spinal, Fall (TLSO when OOB)  Vital Signs:       Cognitive Status:     Pain:  Pain Screen  Pain Scale 1: Numeric (0 - 10)  Pain Intensity 1: 10  Patient Stated Pain Goal: 0  Pain Scale 1: Numeric (0 - 10)  Gross Assessment:     Coordination:     Bed Mobility:     Transfers:  Functional Transfers  Sit to Stand: Contact guard assistance  Stand to Sit: Contact guard assistance     Balance:  Balance  Sitting: With support  Sitting - Static: Fair (occasional) (+)  Sitting - Dynamic: Fair (occasional)  Standing: With support  Standing - Static: Fair  Standing - Dynamic : Fair  ADL Self Care:     ADL Intervention:                               Therapeutic Activities:  Patient states he does want to take pain meds for pain due to resulting in constipation, thus requiring meds for constipation resultung in multiple BMs this date, current c/o back pain 5/10 and  Back pain 7/10 during standing & functional mobility x 97' w/ RW-nursing/Sadie notified and administered pain meds, patient agreeable. Patient CGA sit to stand & TLSO adjusted for optimal fit with instruction provided to patient for increased carryover. Patient participated in standing there act w/ RW and RUE support 2/2 pain primarily on R side of trunk/LE while reaching with LUE for FM & GM task on raised table top x 2 trials of 3 mins 25 secs and 2 mins 20 secs. Patient issued foam w/c seat cushion and elevating leg rests with patient report of improved comfort. Patient escorted via w/c from rehab gym to room for lunch. Patient/Caregiver Education:    Pt. Education on correct hand e.g push up from & reach back for w/c arm rests & feet placement e.g. NWB RLE during sit to stand was provided to prevent falls.      ASSESSMENT:  Patient continues to demonstrate the need for skilled Occupational Therapy services to improve grooming, bathing, upper body dressing, lower body dressing, toileting and toilet transfer  Progression toward goals:  [x]      Improving appropriately and progressing toward goals  []      Improving slowly and progressing toward goals  []      Not making progress toward goals and plan of care will be adjusted     Treatment session:   63 minutes    Therapist:    Charles Tinsley,  9/19/2018

## 2018-09-19 NOTE — PROGRESS NOTES
Late entry: WOODY met with pt and his wife earlier today to complete the social evaluation. Pt lives with his wife in a 2 story home with 3 steps to enter. Pt stays on the first floor and has 1/2 bath only . He has to ambulate 15 steps to the 2nd floor to shower. He was independent with adl's and mobility prior  to admission but used a cane outside the home per wife. His goal in rehab is\" to walk\". WOODY informed pt and his wife of the rehab process and MD visits. Pt's wife informed WOODY that pt will be followed by Dr. Shu Singh at d/c. Devon Velasquez called admitting to have MD's name added to demographic sheet. Pt was pleasant and cooperative. His wife is supportive. SW will follow.

## 2018-09-19 NOTE — PROGRESS NOTES
The patient was offered a group activity of listening to an audio book on September 19, 2018. The patient declined this activity. The  will continue to offer group and 1:1 activities and encourage the patient to participate in the activities.

## 2018-09-20 LAB
GLUCOSE BLD STRIP.AUTO-MCNC: 108 MG/DL (ref 70–110)
GLUCOSE BLD STRIP.AUTO-MCNC: 153 MG/DL (ref 70–110)
GLUCOSE BLD STRIP.AUTO-MCNC: 180 MG/DL (ref 70–110)
GLUCOSE BLD STRIP.AUTO-MCNC: 221 MG/DL (ref 70–110)

## 2018-09-20 PROCEDURE — 74011250637 HC RX REV CODE- 250/637: Performed by: INTERNAL MEDICINE

## 2018-09-20 PROCEDURE — 74011636637 HC RX REV CODE- 636/637: Performed by: INTERNAL MEDICINE

## 2018-09-20 PROCEDURE — 82962 GLUCOSE BLOOD TEST: CPT

## 2018-09-20 RX ORDER — INSULIN GLARGINE 100 [IU]/ML
9 INJECTION, SOLUTION SUBCUTANEOUS DAILY
Status: DISCONTINUED | OUTPATIENT
Start: 2018-09-21 | End: 2018-09-22

## 2018-09-20 RX ADMIN — INSULIN GLARGINE 8 UNITS: 100 INJECTION, SOLUTION SUBCUTANEOUS at 09:55

## 2018-09-20 RX ADMIN — TAMSULOSIN HYDROCHLORIDE 0.4 MG: 0.4 CAPSULE ORAL at 21:50

## 2018-09-20 RX ADMIN — DILTIAZEM HYDROCHLORIDE 180 MG: 180 CAPSULE, EXTENDED RELEASE ORAL at 09:57

## 2018-09-20 RX ADMIN — MAGNESIUM HYDROXIDE 30 ML: 400 SUSPENSION ORAL at 17:26

## 2018-09-20 RX ADMIN — DOCUSATE SODIUM 100 MG: 100 CAPSULE, LIQUID FILLED ORAL at 17:25

## 2018-09-20 RX ADMIN — RANOLAZINE 500 MG: 500 TABLET, FILM COATED, EXTENDED RELEASE ORAL at 09:57

## 2018-09-20 RX ADMIN — METFORMIN HYDROCHLORIDE 500 MG: 500 TABLET ORAL at 17:25

## 2018-09-20 RX ADMIN — NYSTATIN: 100000 POWDER TOPICAL at 10:03

## 2018-09-20 RX ADMIN — RANOLAZINE 500 MG: 500 TABLET, FILM COATED, EXTENDED RELEASE ORAL at 17:25

## 2018-09-20 RX ADMIN — ATORVASTATIN CALCIUM 40 MG: 40 TABLET, FILM COATED ORAL at 21:50

## 2018-09-20 RX ADMIN — HYDROCODONE BITARTRATE AND ACETAMINOPHEN 2 TABLET: 5; 325 TABLET ORAL at 02:03

## 2018-09-20 RX ADMIN — DOCUSATE SODIUM 100 MG: 100 CAPSULE, LIQUID FILLED ORAL at 09:56

## 2018-09-20 RX ADMIN — ASPIRIN 81 MG: 81 TABLET, COATED ORAL at 09:57

## 2018-09-20 RX ADMIN — METOPROLOL TARTRATE 100 MG: 50 TABLET ORAL at 09:57

## 2018-09-20 RX ADMIN — METOPROLOL TARTRATE 100 MG: 50 TABLET ORAL at 17:26

## 2018-09-20 RX ADMIN — RIVAROXABAN 20 MG: 20 TABLET, FILM COATED ORAL at 17:26

## 2018-09-20 RX ADMIN — METFORMIN HYDROCHLORIDE 500 MG: 500 TABLET ORAL at 09:57

## 2018-09-20 RX ADMIN — INSULIN LISPRO 4 UNITS: 100 INJECTION, SOLUTION INTRAVENOUS; SUBCUTANEOUS at 11:33

## 2018-09-20 RX ADMIN — INSULIN LISPRO 2 UNITS: 100 INJECTION, SOLUTION INTRAVENOUS; SUBCUTANEOUS at 07:30

## 2018-09-20 RX ADMIN — INSULIN LISPRO 2 UNITS: 100 INJECTION, SOLUTION INTRAVENOUS; SUBCUTANEOUS at 17:27

## 2018-09-20 RX ADMIN — HYDROCODONE BITARTRATE AND ACETAMINOPHEN 2 TABLET: 5; 325 TABLET ORAL at 15:22

## 2018-09-20 RX ADMIN — NYSTATIN: 100000 POWDER TOPICAL at 17:31

## 2018-09-20 RX ADMIN — NYSTATIN: 100000 POWDER TOPICAL at 21:49

## 2018-09-20 RX ADMIN — ACETAMINOPHEN 650 MG: 325 TABLET, FILM COATED ORAL at 09:56

## 2018-09-20 NOTE — PROGRESS NOTES
Problem: Mobility Impaired (Adult and Pediatric)  Goal: *Acute Goals and Plan of Care (Insert Text)  Physical Therapy Goals  Short term goals: Initiated 9/17/2018, Revised 9/18/18 and to be accomplished within 1-2 weeks. 1.  Patient will move from supine to sit and sit to supine  in bed with supervision/set-up. 2.  Patient will perform sit to stand with supervision/set-up. 3.  Patient will transfer from bed to chair and chair to bed with supervision/set-up using the least restrictive device. 4.  Patient will ambulate with supervision/set-up for 250 feet with the least restrictive device. 5.  Patient will ascend/descend 4 stairs with right handrail(s), 15 stairs left handrail with close supervision/set-up. 6. Patient will verbalize and demonstrate 3/3 lumbar precautions. 7. Patient will demonstrate compliance with lumbar precautions greater than 90% of session. 8. Patient will don/doff lumbar brace with independence to maximize safety for mobility     Long term goals:  Initiated 9/17/2018 and to be accomplished within 3-4 weeks. 1.  Patient will move from supine to sit and sit to supine  in bed with modified independence. 2.  Patient will transfer from bed to chair and chair to bed with modified independence using the least restrictive device. 3.  Patient will perform sit to stand with modified independence. 4.  Patient will ambulate with modified independence for 250 feet with the least restrictive device. 5.  Patient will ascend/descend 4 stairs with right handrail, 15 stairs with left handrail(s) with supervision. Physical Therapist: Corine Parks, PT on 9/17/2018         [address] [place] [address]   physical Therapy Daily Treatment note      Patient:  Jennifer Amezcua (1 y.o. male)               Date: 9/20/2018    Physician: Katie Quiñonez MD  Primary Diagnosis: spinal stenosis          Treatment Diagnosis  Treatment Diagnosis: muscle weakness  Treatment Diagnosis 2: increased need for assist with self care   Precautions: Back, Spinal, Fall (TLSO when OOB)  Vital Signs  Vital Signs  Temp: 98 °F (36.7 °C)  Temp Source: Oral  Pulse (Heart Rate): 74  Resp Rate: 16  O2 Sat (%): 100 %  BP: 111/68  MAP (Calculated): 82  BP 1 Method: Automatic  BP 1 Location: Left arm  BP Patient Position: At rest     Cognitive Status:  Mental Status  Neurologic State: Sleeping  Pain  Pain Screen  Pain Scale 1: Numeric (0 - 10)  Pain Intensity 1: 0  Pain Onset 1: movement  Pain Location 1: Back  Pain Orientation 1: Lower  Pain Description 1: Aching  Pain Intervention(s) 1: Medication (see MAR); Repositioned; Rest  Patient Stated Pain Goal: 0  Pain Reassessment 1: Patient sleeping  Bed Mobility Training     Balance  Sitting: Without support  Sitting - Static: Good (unsupported)  Sitting - Dynamic: Fair (occasional)  Standing: With support  Standing - Static: Fair (+)  Standing - Dynamic : Fair  Transfer Training  Transfer Training  Sit to Stand: Stand-by assistance  Stand to Sit: Stand-by assistance  Sit to Stand: Stand-by assistance  Gait Training  Gait  Base of Support: Center of gravity altered  Speed/Elena: Slow  Step Length: Left shortened;Right shortened  Gait Abnormalities: Decreased step clearance  Ambulation - Level of Assistance: Contact guard assistance  Distance (ft): 170 Feet (ft)  Assistive Device: Gait belt;Orthotic device; Walker, rolling     Gait Abnormalities: Decreased step clearance            With 3 turns. Therapeutic Exercise:    Upon presentation, pt was having lunch but was agreeable to participation, although pt's wife encouraged him to finish his lunch, pt preferred to complete therapy before lunch. Pt described pain as \"much better\". Wife inquired about duration of pt's stay at [facility], therapist informed that we do not yet have a date but pt is progressing along. Pt requested to have weight assessed, therapist agreed.  Gait training as mentioned above, step through gait pattern inconsistent with many standing rest breaks and discontinuous steps. Frequent Verbal cueing for more upright posture. Gait over uneven terrain to have weight assessed, required CGA and verbal cueing for closer proximity before negotiating. Weight assessed at 188.0 lbs. Afterwards, pt c/o increased pain in R hip with pain down to top of R thigh, reports pain increased when he achieved the scale for weight. After rest break, attempted stair training; however, upon stance and a single step to approach stairs, pt c/o increased R hip pain again that was resolved in sitting. Stair training not rendered at this time for this reason. TE rendered in sitting to address circulation and mobility and included: heel raise, toe raises 20 reps x 2 sets with education on compliance with said exercises to address circulation, pt agreeable. Pt then began describing previous experience about 7 years ago around [place] holiday when he had neck surgery at [facility], pt expressed disdain with having been \"kicked out\" after 3 days which he believed was unfair and asked for therapist's professional opinion on the matter. Therapist educated that therapist is unfamiliar with this scenario and is unable to speak on the matter. Patient/Caregiver Education:   Pt /Caregiver Education on (see above). ASSESSMENT:  Patient continues to benefit from Skilled PT services to improve endurance, mobility, gait, stair negotiation. Progression toward goals:  []      Improving appropriately and progressing toward goals  [x]      Improving slowly and progressing toward goals  []      Not making progress toward goals and plan of care will be adjusted     Treatment session: 45 minutes.   Therapist:   Kiesha Jang, PT,          9/20/2018

## 2018-09-20 NOTE — ROUTINE PROCESS
Bedside and Verbal shift change report given to Kelly (oncoming nurse) by Shannan Wolff RN (offgoing nurse). Report included the following information SBAR, Kardex and MAR.

## 2018-09-20 NOTE — PROGRESS NOTES
Problem: Self Care Deficits Care Plan (Adult)  Goal: *Acute Goals and Plan of Care (Insert Text)  OCCUPATIONAL THERAPY SHORT TERM GOALS    Initiated 9/17/2018 and to be accomplished within 2 Week(s)    1. Patient will perform Upper body ADL's without adaptive equipment with supervision/set-up. 2.  Patient will perform Lower body ADL's with adaptive equipment as needed with moderate assistance while adhering to back precautions. 3.  Patient will perform toileting task with moderate assistance with Good safety to reduce falls risk. 4.  Patient will perform functional transfers with Scheryl Rings and minimal assist.  5.  Patient will perform standing static/dynamic balance activities for improved ADL/IADL function with minimal assistance and Fair- balance and safety awareness. 6.  Patient will improve Barthel index scores to atleast 75/100 to improve functional mobility. OCCUPATIONAL THERAPY LONG TERM GOALS   Initiated 9/17/2018 and to be accomplished within 4 Week(s)    1. Patient will perform ADL's & IADLs with adaptive equipment as needed with modified independence. 2.  Patient will perform toileting task with modified independence with Good safety to reduce falls risk. 3.  Patient will perform all functional transfers utilizing least restrictive device and durable medical equipment as needed with modified independence and Good balance and safety awareness. 4.  Patient will perform standing static/dynamic activity for improved ADL/IADL function with modified independence and Good balance and safety awareness. 5.  Patient will improve Barthel index score to 95/100 to improve independence with mobility. Therapist: John Nj    9/17/2018       Time Calculation: 39 mins   [facility]   Occupational Therapy Daily Treatment note      Patient:  Kobe Morrell (2 y.o. male)       Date: 9/20/2018  Attending Physician: Ford Hermosillo MD  Primary Diagnosis: spinal stenosis Treatment Diagnosis  Treatment Diagnosis: muscle weakness  Treatment Diagnosis 2: increased need for assist with self care    Precautions : Precautions at Admission: Back, Spinal, Fall (TLSO when OOB)  Vital Signs:  Vital Signs  Temp: 98 °F (36.7 °C)  Temp Source: Oral  Pulse (Heart Rate): 74  Resp Rate: 16  O2 Sat (%): 100 %  BP: 111/68  MAP (Calculated): 82  BP 1 Method: Automatic  BP 1 Location: Left arm  BP Patient Position: At rest     Cognitive Status:  Mental Status  Neurologic State: Alert  Orientation Level: Oriented X4  Cognition: Follows commands; Appropriate safety awareness; Appropriate for age attention/concentration; Appropriate decision making  Pain:  Pain Screen  Pain Scale 1: Numeric (0 - 10)  Pain Intensity 1: 9  Pain Location 1: Back  Pain Orientation 1: Lower  Pain Description 1: Aching  Pain Intervention(s) 1: Medication (see MAR); Emotional support;Repositioned  Patient Stated Pain Goal: 0  Pain Scale 1: Numeric (0 - 10)  Gross Assessment:     Coordination:     Bed Mobility:     Transfers:  Functional Transfers  Sit to Stand: Stand-by assistance  Stand to Sit: Stand-by assistance     Balance:  Balance  Sitting: Without support  Sitting - Static: Good (unsupported)  Sitting - Dynamic: Fair (occasional)  Standing: With support  Standing - Static: Fair (+)  Standing - Dynamic : Fair  ADL Self Care:  Basic ADL  Type of Bath: Basin/Soap/Water  ADL Intervention:                       Therapeutic Activities:  Patient tolerated standing there act w/ RW x 6 mins and 30 secs and 6 mins w/ SBA, seated rest breaks required 2/2 fatigue and R hip/LE pain radiating down LE. Patient participated in functional reach activity to improve BUE ROM and with min verbal cues for adherence to lumbar back precautions for ADL performance e.g. No twisting during 100 vertical PEG board activity.  At end of OT tx session, patient has no c/o pain while seated w/c level and 10/10 pain in R hip & radiating down R LE while in stance w/ RW-nursing/Katherine notified. Patient/Caregiver Education:    Pt.  Education on lumbar back precautions with reference made to lumbar back precautions posted on dry erase communication board and issued handout/visual aide was provided to increase recall for safety awareness and improved ADL performance skills.       ASSESSMENT:  Patient continues to demonstrate the need for skilled Occupational Therapy services to improve grooming, bathing, upper body dressing, lower body dressing, toileting, toilet transfer and shower transfer  Progression toward goals:  []      Improving appropriately and progressing toward goals  [x]      Improving slowly and progressing toward goals  []      Not making progress toward goals and plan of care will be adjusted     Treatment session:   71 minutes    Therapist:    Christophe Callahan,  9/20/2018

## 2018-09-20 NOTE — PROGRESS NOTES
Nutrition follow-up/Plan of care tcc      RECOMMENDATIONS:   1. Consistent CHO Diet  2. Monitor weight, labs and PO intake  3. RD to follow     GOALS:   1. Ongoing; PO intake meets >75% of protein/calorie needs by 9/27  2. Ongoing: Weight Maintenance (+/- 1-2) by 9/27       ASSESSMENT:   Wt status is classified as obese per BMI of 31.6. Variable PO intake. Labs noted. BG range () over the past 24 hours; A1C (6.9). Nutrition recommendations listed. RD to follow. Nutrition Risk:  [] High  [x] Moderate []  Low    SUBJECTIVE/OBJECTIVE:   (9/12): Pt admitted for lumbar stenosis. PMHx including CAD, HTN, DM, Afib and Meniere's disease. Pt seen in room OOB in chair eating lunch; with wife at bedside. Wife brought in some food from home. Observed ~25% of meal consumed during visit. Reports having a poor appetite for the past week (most likely d/t ileus; now resolved). Stated that the smell of food makes him nauseous and he has the hiccups. Denies having any food allergies or problems chewing/swallowing. Stated appetite is normally good at home. Does not want nutritional supplements at this time. Encouraged intake and will monitor.     (9/16): Transferred from [facility] to [address] [place] [address],[address] on 9/14/2018. States UBW of 190 lb. Stated appetite is improving here in hospital and was eating well at home. Observed 50% intake of lunch meal during visit. States he doesn't like to eat a lot of \"starchy\" foods and stated food preferences. Discussed preferences with CA to be implemented. Encouraged intake and will monitor. (9/20): Pt seen in room after lunch with wife at bedside. Observed 100% of meal consumed, but it was a light lunch. Reports appetite is slowly improving. Weights noted. Will monitor.          Information Obtained from:    [x] Chart Review   [x] Patient   [x] Family/Caregiver   [] Nurse/Physician   [] Interdisciplinary Meeting/Rounds      Diet: Consistent CHO  Medications: [x] Reviewed    Allergies: [x] Reviewed Patient Active Problem List   Diagnosis Code    Lumbar stenosis M48.061    Paroxysmal A-fib ([facility]) I48.0    Microcytic anemia D50.9    DM (diabetes mellitus) ([facility]) E11.9    HTN (hypertension) I10    CAD (coronary artery disease) I25.10    Hiccup R06.6    Ileus ([facility]) K56.7    Urine retention R33.9    Normal cardiac stress test Z13.6       Past Medical History:   Diagnosis Date    Atrial fibrillation ([facility])     CAD (coronary artery disease)     Diabetes ([facility] [address]     Hypertension     Meniere's disease       Labs:    Lab Results   Component Value Date/Time    Sodium 139 09/13/2018 03:00 AM    Potassium 4.1 09/13/2018 03:00 AM    Chloride 103 09/13/2018 03:00 AM    CO2 28 09/13/2018 03:00 AM    Anion gap 8 09/13/2018 03:00 AM    Glucose 209 (H) 09/13/2018 03:00 AM    BUN 11 09/13/2018 03:00 AM    Creatinine 1.12 09/13/2018 03:00 AM    Calcium 7.8 (L) 09/13/2018 03:00 AM    Magnesium 1.4 (L) 12/04/2010 04:05 AM    Albumin 2.6 (L) 09/08/2018 04:00 AM     Anthropometrics: BMI (calculated): 31.6  Last 3 Recorded Weights in this Encounter    09/14/18 1726 09/19/18 0817   Weight: 92 kg (202 lb 12.8 oz) 88.8 kg (195 lb 12.8 oz)      Ht Readings from Last 1 Encounters:   09/14/18 5' 6\" (1.676 m)     Weight Metrics 9/19/2018 9/13/2018 9/5/2018   Weight 195 lb 12.8 oz 208 lb -   BMI 31.6 kg/m2 - 33.57 kg/m2       No data found.        [] Weight Loss  [] Weight Gain   [x] Weight Stable    Estimated Nutrition Needs: [x] MSJ  [] Other:  Calories: 7612-7725 kcal Based on:   [x] Actual BW    Protein:   94 g Based on:   [x] Actual BW    Fluid:       2200- 2364 ml Based on:   [x] Actual BW        Nutrition Problems Identified:   [x] Suboptimal PO intake (improving)  [] Food Allergies  [] Difficulty chewing/swallowing/poor dentition  [] Constipation/Diarrhea   [] Nausea/Vomiting   [] None  [] Other:     Plan:   [x] Therapeutic Diet  []  Obtained/adjusted food preferences/tolerances and/or snacks options   []  Supplements added [] Occupational therapy following for feeding techniques  []  HS snack added   []  Modify diet texture   []  Modify diet for food allergies   []  Educate patient   []  Assist with menu selection   [x]  Monitor PO intake on meal rounds   [x]  Continue inpatient monitoring and intervention   [x]  Participated in discharge planning/Interdisciplinary rounds/Team meetings   []  Other:     Education Needs:   [] Not appropriate for teaching at this time due to:   [x] Identified and addressed    Nutrition Monitoring and Evaluation:  [x] Continue ongoing monitoring and intervention  [] Anjana Monroy

## 2018-09-21 LAB
GLUCOSE BLD STRIP.AUTO-MCNC: 121 MG/DL (ref 70–110)
GLUCOSE BLD STRIP.AUTO-MCNC: 134 MG/DL (ref 70–110)
GLUCOSE BLD STRIP.AUTO-MCNC: 143 MG/DL (ref 70–110)
GLUCOSE BLD STRIP.AUTO-MCNC: 250 MG/DL (ref 70–110)

## 2018-09-21 PROCEDURE — 74011636637 HC RX REV CODE- 636/637: Performed by: INTERNAL MEDICINE

## 2018-09-21 PROCEDURE — 82962 GLUCOSE BLOOD TEST: CPT

## 2018-09-21 PROCEDURE — 74011250637 HC RX REV CODE- 250/637: Performed by: INTERNAL MEDICINE

## 2018-09-21 PROCEDURE — 74011250636 HC RX REV CODE- 250/636: Performed by: INTERNAL MEDICINE

## 2018-09-21 RX ADMIN — ONDANSETRON 4 MG: 2 INJECTION INTRAMUSCULAR; INTRAVENOUS at 09:23

## 2018-09-21 RX ADMIN — DILTIAZEM HYDROCHLORIDE 180 MG: 180 CAPSULE, EXTENDED RELEASE ORAL at 08:17

## 2018-09-21 RX ADMIN — RANOLAZINE 500 MG: 500 TABLET, FILM COATED, EXTENDED RELEASE ORAL at 17:53

## 2018-09-21 RX ADMIN — METFORMIN HYDROCHLORIDE 500 MG: 500 TABLET ORAL at 17:53

## 2018-09-21 RX ADMIN — DOCUSATE SODIUM 100 MG: 100 CAPSULE, LIQUID FILLED ORAL at 17:53

## 2018-09-21 RX ADMIN — ATORVASTATIN CALCIUM 40 MG: 40 TABLET, FILM COATED ORAL at 21:40

## 2018-09-21 RX ADMIN — DOCUSATE SODIUM 100 MG: 100 CAPSULE, LIQUID FILLED ORAL at 08:17

## 2018-09-21 RX ADMIN — TAMSULOSIN HYDROCHLORIDE 0.4 MG: 0.4 CAPSULE ORAL at 21:40

## 2018-09-21 RX ADMIN — RANOLAZINE 500 MG: 500 TABLET, FILM COATED, EXTENDED RELEASE ORAL at 08:16

## 2018-09-21 RX ADMIN — RIVAROXABAN 20 MG: 20 TABLET, FILM COATED ORAL at 17:53

## 2018-09-21 RX ADMIN — INSULIN LISPRO 6 UNITS: 100 INJECTION, SOLUTION INTRAVENOUS; SUBCUTANEOUS at 17:54

## 2018-09-21 RX ADMIN — ASPIRIN 81 MG: 81 TABLET, COATED ORAL at 08:16

## 2018-09-21 RX ADMIN — INSULIN GLARGINE 9 UNITS: 100 INJECTION, SOLUTION SUBCUTANEOUS at 08:17

## 2018-09-21 RX ADMIN — ACETAMINOPHEN 650 MG: 325 TABLET, FILM COATED ORAL at 08:16

## 2018-09-21 RX ADMIN — METFORMIN HYDROCHLORIDE 500 MG: 500 TABLET ORAL at 08:17

## 2018-09-21 RX ADMIN — NYSTATIN: 100000 POWDER TOPICAL at 09:00

## 2018-09-21 RX ADMIN — METOPROLOL TARTRATE 100 MG: 50 TABLET ORAL at 17:53

## 2018-09-21 RX ADMIN — HYDROCODONE BITARTRATE AND ACETAMINOPHEN 2 TABLET: 5; 325 TABLET ORAL at 15:50

## 2018-09-21 RX ADMIN — METOPROLOL TARTRATE 100 MG: 50 TABLET ORAL at 08:16

## 2018-09-21 RX ADMIN — NYSTATIN: 100000 POWDER TOPICAL at 18:00

## 2018-09-21 NOTE — PROGRESS NOTES
Problem: Mobility Impaired (Adult and Pediatric)  Goal: *Acute Goals and Plan of Care (Insert Text)  Physical Therapy Goals  Short term goals: Initiated 9/17/2018, Revised 9/18/18 and to be accomplished within 1-2 weeks (updated 9/21/18)    1. Patient will move from supine to sit and sit to supine  in bed with supervision/set-up. (Progressing; SBA)     2.  Patient will perform sit to stand with supervision/set-up. (Progressing; SBA)  3. Patient will transfer from bed to chair and chair to bed with supervision/set-up using the least restrictive device. (Progressing; CGA using RW)  4.  Patient will ambulate with supervision/set-up for 250 feet with the least restrictive device. (Progressing; CGA x 170 ft using RW)  5.  Patient will ascend/descend 4 stairs with right handrail(s), 15 stairs left handrail with close supervision/set-up. (Progressing; CGA x 5 stairs using B HR)  6. Patient will verbalize and demonstrate 3/3 lumbar precautions. (Progressing; cueing required for 2/3 lumbar precautions)   7. Patient will demonstrate compliance with lumbar precautions greater than 90% of session. (Achieved)      Long term goals:  Initiated 9/17/2018 and to be accomplished within 3-4 weeks. 1.  Patient will move from supine to sit and sit to supine  in bed with modified independence. 2.  Patient will transfer from bed to chair and chair to bed with modified independence using the least restrictive device. 3.  Patient will perform sit to stand with modified independence. 4.  Patient will ambulate with modified independence for 250 feet with the least restrictive device. 5.  Patient will ascend/descend 4 stairs with right handrail, 15 stairs with left handrail(s) with supervision. Physical Therapist: Natacha Vargas, PT on 9/17/2018         [facility]   PHYSICAL THERAPY WEEKLY PROGRESS REPORT  Reporting Period:  Date:   9/17/18*  to 9/21/18      Patient:  Sherry Lugo (70 y.o. male) Date: 9/21/2018    Primary Diagnosis: spinal stenosis      Attending Physician: Saima Tinsley MD   Treatment Diagnosis  Treatment Diagnosis: muscle weakness  Treatment Diagnosis 2: difficulty in walking   Precautions:  Back, Spinal, Fall (TLSO when OOB)  Rehab Potential : Good:    Skill interventions and education provided with clinical rationale (include individualized treatment techniques and standardized tests):   Skilled Physical Therapy services were provided with:   TE rendered to address strength, endurance, mobility. TA rendered to address bed mobility, sit <> stand, bed <> chair transfers. Gait training to address gait deficits and assess use of RW. Neuromuscular re-education to address balance impairments and reduce fall risk. Patient and family education. Stair training to address stair negotiation for safe entrance into home. Wheelchair management to address safe wheelchair mobility to increase independence in facility. Using a comparative statement, summarize significant progress toward goals as a result of skilled intervention provided:  Patient has made Fair progress towards their Physical Therapy goals in the areas of bed mobility, transfers, ambulation, stair negotiation. Progressing from [id number] for transfers to CGA/SBA. Continue POC. Identify remaining functional areas, impairments limiting progress and/or barriers to improvement:  Patient would benefit from continues PT services to address the following functional deficits in standing balance, ambulation using RW, stair negotiation, transfers. Barriers include: RLE pain.      OBJECTIVE DATA SUMMARY:     INITIAL ASSESSMENT WEEKLY ASSESSMENT   COGNITIVE STATUS COGNITIVE STATUS   Neurologic State: Alert  Orientation Level: Oriented X4  Cognition: Follows commands, Decreased attention/concentration  Perception: Appears intact  Perseveration: No perseveration noted  Safety/Judgement: Fall prevention Neurologic State: Alert  Orientation Level: Oriented X4  Cognition: Appropriate safety awareness, Appropriate for age attention/concentration, Appropriate decision making, Follows commands  Perception: Appears intact  Perseveration: No perseveration noted  Safety/Judgement: Fall prevention   PAIN PAIN   Pain Scale 1: Numeric (0 - 10)  Pain Intensity 1: 5  Pain Onset 1: Acute  Pain Location 1: Chest  Pain Orientation 1: Lower  Pain Description 1: Heaviness, Throbbing  Pain Intervention(s) 1: Medication (see MAR)  Patient Stated Pain Goal: 0  Pain Reassessment 1: Yes Pain Scale 1: Numeric (0 - 10)  Pain Intensity 1: 0  Pain Onset 1: movement  Pain Location 1: Back  Pain Orientation 1: Lower  Pain Description 1: Aching  Pain Intervention(s) 1: Medication (see MAR), Emotional support, Repositioned  Patient Stated Pain Goal: 0  Pain Reassessment 1: Patient sleeping   GROSS ASSESSMENT GROSS ASSESSMENT   AROM: Within functional limits (BUEs)  Strength: Generally decreased, functional (BUEs MMT not performed 2/2 back precautions, grossly 3/5)  Coordination: Within functional limits (BUEs)  Tone: Normal (BUEs)  Sensation: Intact (BUEs) AROM: Within functional limits (BUEs)  Strength: Generally decreased, functional (BUEs MMT not performed 2/2 back precautions, grossly 3/5)  Coordination: Within functional limits (BUEs)  Tone: Normal (BUEs)  Sensation: Intact (BUEs)   BED MOBILITY BED MOBILITY   Rolling: Contact guard assistance  Supine to Sit: Stand-by assistance  Scooting: Stand-by assistance Rolling: Supervision  Supine to Sit: Supervision  Scooting: Stand-by assistance   GAIT GAIT   Base of Support: Center of gravity altered  Speed/Elena: Slow  Step Length: Left shortened, Right shortened  Gait Abnormalities: Decreased step clearance  Ambulation - Level of Assistance: Contact guard assistance  Distance (ft): 42 Feet (ft)  Assistive Device: Gait belt, Walker, rolling  Rail Use: Both  Stairs - Level of Assistance: Contact guard assistance  Number of Stairs Trained: 5  Interventions: Verbal cues Base of Support: Center of gravity altered  Speed/Elena: Slow  Step Length: Left shortened, Right shortened  Gait Abnormalities: Decreased step clearance  Ambulation - Level of Assistance: Contact guard assistance  Distance (ft): 154 Feet (ft)  Assistive Device: Gait belt, Walker, rolling  Rail Use: Both  Stairs - Level of Assistance: Contact guard assistance  Number of Stairs Trained: 5  Interventions: Verbal cues                     TRANSFERS TRANSFERS   Sit to Stand: Contact guard assistance  Stand to Sit: Contact guard assistance  Bed to Chair: Contact guard assistance Sit to Stand: Contact guard assistance  Stand to Sit: Stand-by assistance  Bed to Chair: Contact guard assistance   BALANCE BALANCE   Sitting:  (unable 2/2 drowsiness s/p p! meds and back p! 10/10)  Sitting - Static: Fair (occasional) (+)  Sitting - Dynamic: Fair (occasional)  Standing:  (unable 2/2 drowsiness s/p p! meds and back p! 10/10)  Standing - Static: Fair  Standing - Dynamic : Fair Sitting: Without support  Sitting - Static: Good (unsupported)  Sitting - Dynamic: Fair (occasional)  Standing: With support  Standing - Static: Fair (+)  Standing - Dynamic : Fair   WHEELCHAIR MOBILITY/MGMT WHEELCHAIR MOBILITY/MGMT         Activity Tolerance:  Fair Activity Tolerance: Fair   Visual/Perceptual   Corrective Lenses: Glasses      Visual/Perceptual   Vision  Corrective Lenses: Glasses        Auditory:   Auditory Impairment: None    Auditory:   Auditory  Auditory Impairment: None       Steps: both   Clinical Decision makin Clinical Decision makin [place] Standing Balance Scale     Treatment:   Upon initial presentation, pt in bed asleep, awakened with verbal and tactile stimuli. He reported not feeling well, having had vomited this morning, and described 5/10 R hip pain.  Therapist f/u with nurse who reported that pt has received nausea meds through IV and received tylenol for pain. Therapist noted that at rest, pt still has 5/10 pain. She was aware but stated other medication causes drowsiness and dizziness. Therapist inquired if nurse could f/u with physician regarding pain meds as pt needs pain to be controlled for improved participation in walking and stair negotiation, as during previous day pt was unable to negotiate stairs due to RLE pain. Nurse confirmed. Therapist informed patient of f/u with nurse. Pt refused treatment at this time, reporting \"I feel like I need to sleep. \" Therapist educated on safety and use of call bell for assistance, pt confirmed. Upon return, pt reported feeling better. Stair training using B HR with verbal cueing for step to pattern, ascending with L and descending leading with R. Gait training x 154 ft using RW with very slow gait speed and discontinuous steps, frequent verbal and tactile cueing for more upright posture and close proximity to RW. TE rendered to include heel raises and toe raises 20 reps x 2 sets to address circulation with education on completion as HEP, pt confirmed understanding. Patient's response to treatment rendered:  Limited due to pain and nausea. Patient expected Discharge Location:  [x]Private Residence  [] [facility]  []LTC  []Other:    Plan: Continue Skilled PT services as established by the Plan of Care for 3-6 times a week. PT and Assistant have had a weekly case conference regarding the above treatment:  [x] Yes     [] No       Treatment session:  64 minutes. Therapist: Bridger Gibbs, PT       Date:9/21/2018  Forward to PT for co-signature when completed.

## 2018-09-21 NOTE — ROUTINE PROCESS
Bedside and Verbal shift change report given to Mikhail Boo (oncoming nurse) by Nasir Loomis RN (offgoing nurse). Report included the following information SBAR, Kardex and MAR. QH visual checks and 24h chart checks done.

## 2018-09-21 NOTE — ROUTINE PROCESS
Bedside and verbal shift report given to DAMIÁN Ellsworth (oncoming nurse) by ANEESH Klein LPN (off going nurse). Report included SBAR, MAR and Kardex.

## 2018-09-21 NOTE — PROGRESS NOTES
Problem: Self Care Deficits Care Plan (Adult)  Goal: *Acute Goals and Plan of Care (Insert Text)  OCCUPATIONAL THERAPY SHORT TERM GOALS    Initiated 9/17/2018 and to be accomplished within 2 Week(s)    1. Patient will perform Upper body ADL's without adaptive equipment with supervision/set-up. 2.  Patient will perform Lower body ADL's with adaptive equipment as needed with moderate assistance while adhering to back precautions. 3.  Patient will perform toileting task with moderate assistance with Good safety to reduce falls risk. 4.  Patient will perform functional transfers with [place] and minimal assist.  5.  Patient will perform standing static/dynamic balance activities for improved ADL/IADL function with minimal assistance and Fair- balance and safety awareness. 6.  Patient will improve Barthel index scores to atleast 75/100 to improve functional mobility. OCCUPATIONAL THERAPY LONG TERM GOALS   Initiated 9/17/2018 and to be accomplished within 4 Week(s)    1. Patient will perform ADL's & IADLs with adaptive equipment as needed with modified independence. 2.  Patient will perform toileting task with modified independence with Good safety to reduce falls risk. 3.  Patient will perform all functional transfers utilizing least restrictive device and durable medical equipment as needed with modified independence and Good balance and safety awareness. 4.  Patient will perform standing static/dynamic activity for improved ADL/IADL function with modified independence and Good balance and safety awareness. 5.  Patient will improve Barthel index score to 95/100 to improve independence with mobility. Therapist: Refugio Feliz    9/17/2018       Time Calculation: 39 mins   [facility]   Occupational Therapy Daily Treatment note      Patient:  Omaira Alas (47 y.o. male)       Date: 9/21/2018  Attending Physician: Cinthia Lea MD  Primary Diagnosis: spinal stenosis Treatment Diagnosis  Treatment Diagnosis: muscle weakness  Treatment Diagnosis 2: increased need for assist with self care    Precautions : Precautions at Admission: Back, Spinal, Fall (TLSO when OOB)  Vital Signs:  Vital Signs  Temp: 98.1 °F (36.7 °C)  Temp Source: Oral  Pulse (Heart Rate): 79  Resp Rate: 20  O2 Sat (%): 100 %  BP: 148/64  MAP (Calculated): 92  BP 1 Method: Automatic  BP 1 Location: Left arm  BP Patient Position: At rest     Cognitive Status:  Mental Status  Neurologic State: Alert  Orientation Level: Oriented X4  Cognition: Appropriate safety awareness; Appropriate for age attention/concentration; Appropriate decision making; Follows commands  Pain:  Pain Screen  Pain Scale 1: Numeric (0 - 10)  Pain Intensity 1: 0  Patient Stated Pain Goal: 0  Pain Scale 1: Numeric (0 - 10)  Gross Assessment:     Coordination:     Bed Mobility:  Bed Mobility  Rolling: Supervision  Supine to Sit: Supervision  Transfers:  Functional Transfers  Sit to Stand: Contact guard assistance  Functional Transfers  Bathroom Mobility: Contact guard assistance  Balance:  Balance  Sitting: Without support  Sitting - Static: Good (unsupported)  Sitting - Dynamic: Fair (occasional)  Standing: With support  Standing - Static: Fair (+)  Standing - Dynamic : Fair  ADL Self Care:     ADL Intervention:     Upper Body Dressing Assistance  Orthotics(Brace): Minimum assistance        Grooming  Grooming Assistance: Contact guard assistance  Washing Hands: Contact guard assistance ((able to tolerate for 1 1/2 prior to requesting to sit))             Therapeutic Activities:  Shadowed patient with bed mobility in order to assess safety and independence while adhering to back precautions needed to safely complete ADLs. Cueing needed for proper rolling technique while keeping back precautions for optimal safety.  Practiced bathroom mobility utilizing RW and grooming, standing sinkside, in order to increase functional activity tolerance and safety during routine. Patient was able to tolerate standing for 1:30 with CGA prior to requesting to sit due to R LE cramp. Patient was unable to safely complete two hand release, at standing, due to reports of increased R hip pain today. Dynamic standing activity in order to challenge balance and stability as well needed for ADL tasks and transfers. Patient/Caregiver Education:    Pt. Antonio Laughter Education on see above.       ASSESSMENT:  Patient continues to demonstrate the need for skilled Occupational Therapy services to improve static standing balance needed for bathing  Progression toward goals:  [x]      Improving appropriately and progressing toward goals  []      Improving slowly and progressing toward goals  []      Not making progress toward goals and plan of care will be adjusted     Treatment session:   61 minutes    Therapist:    ROSANGELA Alicea,  9/21/2018

## 2018-09-22 LAB
GLUCOSE BLD STRIP.AUTO-MCNC: 115 MG/DL (ref 70–110)
GLUCOSE BLD STRIP.AUTO-MCNC: 180 MG/DL (ref 70–110)
GLUCOSE BLD STRIP.AUTO-MCNC: 212 MG/DL (ref 70–110)
GLUCOSE BLD STRIP.AUTO-MCNC: 240 MG/DL (ref 70–110)

## 2018-09-22 PROCEDURE — 74011250637 HC RX REV CODE- 250/637: Performed by: INTERNAL MEDICINE

## 2018-09-22 PROCEDURE — 82962 GLUCOSE BLOOD TEST: CPT

## 2018-09-22 PROCEDURE — 74011636637 HC RX REV CODE- 636/637: Performed by: INTERNAL MEDICINE

## 2018-09-22 RX ORDER — INSULIN GLARGINE 100 [IU]/ML
10 INJECTION, SOLUTION SUBCUTANEOUS DAILY
Status: DISCONTINUED | OUTPATIENT
Start: 2018-09-23 | End: 2018-09-28

## 2018-09-22 RX ADMIN — INSULIN LISPRO 4 UNITS: 100 INJECTION, SOLUTION INTRAVENOUS; SUBCUTANEOUS at 11:30

## 2018-09-22 RX ADMIN — RIVAROXABAN 20 MG: 20 TABLET, FILM COATED ORAL at 18:00

## 2018-09-22 RX ADMIN — METOPROLOL TARTRATE 100 MG: 50 TABLET ORAL at 17:57

## 2018-09-22 RX ADMIN — ATORVASTATIN CALCIUM 40 MG: 40 TABLET, FILM COATED ORAL at 21:28

## 2018-09-22 RX ADMIN — METFORMIN HYDROCHLORIDE 500 MG: 500 TABLET ORAL at 08:03

## 2018-09-22 RX ADMIN — BISACODYL 10 MG: 10 SUPPOSITORY RECTAL at 11:00

## 2018-09-22 RX ADMIN — ASPIRIN 81 MG: 81 TABLET, COATED ORAL at 08:02

## 2018-09-22 RX ADMIN — MAGNESIUM HYDROXIDE 30 ML: 400 SUSPENSION ORAL at 08:14

## 2018-09-22 RX ADMIN — TAMSULOSIN HYDROCHLORIDE 0.4 MG: 0.4 CAPSULE ORAL at 21:28

## 2018-09-22 RX ADMIN — INSULIN LISPRO 4 UNITS: 100 INJECTION, SOLUTION INTRAVENOUS; SUBCUTANEOUS at 21:29

## 2018-09-22 RX ADMIN — METOPROLOL TARTRATE 100 MG: 50 TABLET ORAL at 08:02

## 2018-09-22 RX ADMIN — INSULIN LISPRO 2 UNITS: 100 INJECTION, SOLUTION INTRAVENOUS; SUBCUTANEOUS at 17:30

## 2018-09-22 RX ADMIN — INSULIN GLARGINE 9 UNITS: 100 INJECTION, SOLUTION SUBCUTANEOUS at 09:00

## 2018-09-22 RX ADMIN — RANOLAZINE 500 MG: 500 TABLET, FILM COATED, EXTENDED RELEASE ORAL at 17:53

## 2018-09-22 RX ADMIN — DOCUSATE SODIUM 100 MG: 100 CAPSULE, LIQUID FILLED ORAL at 17:53

## 2018-09-22 RX ADMIN — HYDROCODONE BITARTRATE AND ACETAMINOPHEN 2 TABLET: 5; 325 TABLET ORAL at 14:35

## 2018-09-22 RX ADMIN — NYSTATIN: 100000 POWDER TOPICAL at 18:03

## 2018-09-22 RX ADMIN — DILTIAZEM HYDROCHLORIDE 180 MG: 180 CAPSULE, EXTENDED RELEASE ORAL at 08:03

## 2018-09-22 RX ADMIN — METFORMIN HYDROCHLORIDE 500 MG: 500 TABLET ORAL at 17:53

## 2018-09-22 RX ADMIN — DOCUSATE SODIUM 100 MG: 100 CAPSULE, LIQUID FILLED ORAL at 08:02

## 2018-09-22 RX ADMIN — RANOLAZINE 500 MG: 500 TABLET, FILM COATED, EXTENDED RELEASE ORAL at 08:04

## 2018-09-22 NOTE — PROGRESS NOTES
Problem: Self Care Deficits Care Plan (Adult)  Goal: *Acute Goals and Plan of Care (Insert Text)  OCCUPATIONAL THERAPY SHORT TERM GOALS      Updated 9/21/18    1. Patient will perform Upper body ADL's without adaptive equipment with supervision/set-up. 2.  Patient will perform Lower body ADL's with adaptive equipment as needed with moderate assistance while adhering to back precautions. 3.  Patient will perform toileting task with moderate assistance with Good safety to reduce falls risk. 4.  Patient will perform functional transfers with Rolling Walker and CGA. 5.  Patient will perform standing static/dynamic balance activities for improved ADL/IADL function with CGA and Fair- balance and safety awareness. 6.  Patient will improve Barthel index scores to atleast 75/100 to improve functional mobility. Initiated 9/17/2018 and to be accomplished within 2 Week(s)    1. Patient will perform Upper body ADL's without adaptive equipment with supervision/set-up.(progressing-currently Min A)  2. Patient will perform Lower body ADL's with adaptive equipment as needed with moderate assistance while adhering to back precautions. (progressing-currently Max A)  3. Patient will perform toileting task with moderate assistance with Good safety to reduce falls risk. (progressing-currently Max A)  4. Patient will perform functional transfers with Rolling Walker and minimal assist. (goal met-[facility] CGA)  5. Patient will perform standing static/dynamic balance activities for improved ADL/IADL function with minimal assistance and Fair- balance and safety awareness. (goal met-[facility] CGA)  6. Patient will improve Barthel index scores to atleast 75/100 to improve functional mobility. (progressing)      OCCUPATIONAL THERAPY LONG TERM GOALS   Initiated 9/17/2018 and to be accomplished within 4 Week(s)    1. Patient will perform ADL's & IADLs with adaptive equipment as needed with modified independence.   2.  Patient will perform toileting task with modified independence with Good safety to reduce falls risk. 3.  Patient will perform all functional transfers utilizing least restrictive device and durable medical equipment as needed with modified independence and Good balance and safety awareness. 4.  Patient will perform standing static/dynamic activity for improved ADL/IADL function with modified independence and Good balance and safety awareness. 5.  Patient will improve Barthel index score to 95/100 to improve independence with mobility. Therapist: Melissa Ascencio    9/17/2018       Time Calculation: 39 mins     [facility]   Occupational Therapy Daily Treatment note      Patient:  Fernanda Ellison (70 y.o. male)       Date: 9/22/2018  Attending Physician: Reddy Lazcano MD  Primary Diagnosis: spinal stenosis    Treatment Diagnosis  Treatment Diagnosis: muscle weakness  Treatment Diagnosis 2: increased need for assist with self care    Precautions : Precautions at Admission: Back, Spinal, Fall (TLSO when OOB)  Vital Signs:  Vital Signs  Temp: 97.8 °F (36.6 °C)  Temp Source: Oral  Pulse (Heart Rate): 65  Resp Rate: 18  O2 Sat (%): 97 %  BP: 144/62  MAP (Calculated): 89  BP 1 Method: Automatic  BP 1 Location: Left arm  BP Patient Position: At rest     Cognitive Status:  Mental Status  Neurologic State: Alert  Orientation Level: Oriented X4  Cognition: Appropriate safety awareness  Bed Mobility:  Bed Mobility  Rolling: Supervision  Supine to Sit: Supervision  Scooting: Supervision  Transfers:  Functional Transfers  Sit to Stand: Contact guard assistance  Stand to Sit: Contact guard assistance  Toilet Transfer : Contact guard assistance  Functional Transfers  Toilet Transfer : Contact guard assistance  Balance:  Balance  Sitting: Without support  Sitting - Static: Good (unsupported)  Sitting - Dynamic: Fair (occasional)  Standing: With support  Standing - Static: Fair (+)  Standing - Dynamic : Fair  ADL Self Care:  Basic ADL  Oral Facial Hygiene/Grooming: Contact guard assistance (@ stand with RW)  Lower Body Dressing: Contact guard assistance (with AE)  Toileting: Contact guard assistance  ADL Intervention:     Upper Body Dressing Assistance  Orthotics(Brace): Minimum assistance  Therapeutic Activities:  Pt presented supine in bed upon therapist arrival. Pt demo fnxl bed mobility with SR's and increased time, see above for level of A. CGA fnxl room and hallway mobility with RW. Pt performed LB dressing task CGA with AE, toileting routine CGA, donning back brace MIN A @ stand, and CGA with oral care and face washing @ stand. Pt requires mod cueing to adhere to spinal precautions especially for twisting movements. Pt stood ~ 4.5 mins x 3 trials CGA with 1 UE support to complete table top activities to increase standing tolerance needed needed for ADL routines. Pt reported R LE/ hip pain 8/10, NSG aware. Patient/Caregiver Education:    Pt educated on importance of adhering to spinal precautions to achieve optimal healing. ASSESSMENT:  Patient continues to demonstrate the need for skilled Occupational Therapy services to improve strength, balance, and endurance.    Progression toward goals:  [x]      Improving appropriately and progressing toward goals  []      Improving slowly and progressing toward goals  []      Not making progress toward goals and plan of care will be adjusted     Treatment session:   60 minutes    Therapist:    ROSANGELA Bal,  9/22/2018

## 2018-09-22 NOTE — ROUTINE PROCESS
Bedside and Verbal shift change report given to Becki (oncoming nurse) by Everett Warren RN (offgoing nurse). Report included the following information SBAR, Kardex and MAR.

## 2018-09-22 NOTE — ROUTINE PROCESS
Bedside and Verbal shift change report given to Greg Og rn (oncoming nurse) by claude neal (offgoing nurse). Report included the following information Kardex, Intake/Output and Recent Results.

## 2018-09-22 NOTE — ROUTINE PROCESS
Patient after given suppository and MOM this am was able to pass a large BM.  Patient verbalized relief of constipation

## 2018-09-23 LAB
GLUCOSE BLD STRIP.AUTO-MCNC: 117 MG/DL (ref 70–110)
GLUCOSE BLD STRIP.AUTO-MCNC: 138 MG/DL (ref 70–110)
GLUCOSE BLD STRIP.AUTO-MCNC: 153 MG/DL (ref 70–110)
GLUCOSE BLD STRIP.AUTO-MCNC: 178 MG/DL (ref 70–110)

## 2018-09-23 PROCEDURE — 74011636637 HC RX REV CODE- 636/637: Performed by: INTERNAL MEDICINE

## 2018-09-23 PROCEDURE — 82962 GLUCOSE BLOOD TEST: CPT

## 2018-09-23 PROCEDURE — 74011250637 HC RX REV CODE- 250/637: Performed by: INTERNAL MEDICINE

## 2018-09-23 RX ADMIN — INSULIN GLARGINE 10 UNITS: 100 INJECTION, SOLUTION SUBCUTANEOUS at 09:41

## 2018-09-23 RX ADMIN — RANOLAZINE 500 MG: 500 TABLET, FILM COATED, EXTENDED RELEASE ORAL at 09:39

## 2018-09-23 RX ADMIN — HYDROCODONE BITARTRATE AND ACETAMINOPHEN 2 TABLET: 5; 325 TABLET ORAL at 12:38

## 2018-09-23 RX ADMIN — INSULIN LISPRO 2 UNITS: 100 INJECTION, SOLUTION INTRAVENOUS; SUBCUTANEOUS at 17:31

## 2018-09-23 RX ADMIN — RIVAROXABAN 20 MG: 20 TABLET, FILM COATED ORAL at 17:32

## 2018-09-23 RX ADMIN — ATORVASTATIN CALCIUM 40 MG: 40 TABLET, FILM COATED ORAL at 21:45

## 2018-09-23 RX ADMIN — METOPROLOL TARTRATE 100 MG: 50 TABLET ORAL at 09:39

## 2018-09-23 RX ADMIN — RANOLAZINE 500 MG: 500 TABLET, FILM COATED, EXTENDED RELEASE ORAL at 17:32

## 2018-09-23 RX ADMIN — DOCUSATE SODIUM 100 MG: 100 CAPSULE, LIQUID FILLED ORAL at 09:39

## 2018-09-23 RX ADMIN — TAMSULOSIN HYDROCHLORIDE 0.4 MG: 0.4 CAPSULE ORAL at 21:45

## 2018-09-23 RX ADMIN — NYSTATIN: 100000 POWDER TOPICAL at 09:00

## 2018-09-23 RX ADMIN — METOPROLOL TARTRATE 100 MG: 50 TABLET ORAL at 17:32

## 2018-09-23 RX ADMIN — HYDROCODONE BITARTRATE AND ACETAMINOPHEN 2 TABLET: 5; 325 TABLET ORAL at 21:45

## 2018-09-23 RX ADMIN — METFORMIN HYDROCHLORIDE 500 MG: 500 TABLET ORAL at 09:39

## 2018-09-23 RX ADMIN — NYSTATIN: 100000 POWDER TOPICAL at 18:00

## 2018-09-23 RX ADMIN — METFORMIN HYDROCHLORIDE 500 MG: 500 TABLET ORAL at 17:32

## 2018-09-23 RX ADMIN — DILTIAZEM HYDROCHLORIDE 180 MG: 180 CAPSULE, EXTENDED RELEASE ORAL at 09:39

## 2018-09-23 RX ADMIN — ASPIRIN 81 MG: 81 TABLET, COATED ORAL at 09:39

## 2018-09-23 RX ADMIN — INSULIN LISPRO 2 UNITS: 100 INJECTION, SOLUTION INTRAVENOUS; SUBCUTANEOUS at 21:45

## 2018-09-23 RX ADMIN — DOCUSATE SODIUM 100 MG: 100 CAPSULE, LIQUID FILLED ORAL at 17:32

## 2018-09-23 NOTE — PROGRESS NOTES
Problem: Mobility Impaired (Adult and Pediatric)  Goal: *Acute Goals and Plan of Care (Insert Text)  Physical Therapy Goals  Short term goals: Initiated 9/17/2018, Revised 9/18/18 and to be accomplished within 1-2 weeks (updated 9/21/18)    1. Patient will move from supine to sit and sit to supine  in bed with supervision/set-up. (Progressing; SBA)     2.  Patient will perform sit to stand with supervision/set-up. (Progressing; SBA)  3. Patient will transfer from bed to chair and chair to bed with supervision/set-up using the least restrictive device. (Progressing; CGA using RW)  4.  Patient will ambulate with supervision/set-up for 250 feet with the least restrictive device. (Progressing; CGA x 170 ft using RW)  5.  Patient will ascend/descend 4 stairs with right handrail(s), 15 stairs left handrail with close supervision/set-up. (Progressing; CGA x 5 stairs using B HR)  6. Patient will verbalize and demonstrate 3/3 lumbar precautions. (Progressing; cueing required for 2/3 lumbar precautions)   7. Patient will demonstrate compliance with lumbar precautions greater than 90% of session. (Achieved)      Long term goals:  Initiated 9/17/2018 and to be accomplished within 3-4 weeks. 1.  Patient will move from supine to sit and sit to supine  in bed with modified independence. 2.  Patient will transfer from bed to chair and chair to bed with modified independence using the least restrictive device. 3.  Patient will perform sit to stand with modified independence. 4.  Patient will ambulate with modified independence for 250 feet with the least restrictive device. 5.  Patient will ascend/descend 4 stairs with right handrail, 15 stairs with left handrail(s) with supervision. Physical Therapist: Gavino Huff, PT on 9/17/2018          [address] [place] [address]   physical Therapy Daily Treatment note      Patient:  Acacia Euceda (53 y.o. male)               Date: 9/23/2018    Physician: Rupert Reveles, MD  Primary Diagnosis: spinal stenosis          Treatment Diagnosis  Treatment Diagnosis: muscle weakness  Treatment Diagnosis 2: increased need for assist with self care   Precautions: Back, Spinal, Fall (TLSO when OOB)  Vital Signs  Vital Signs  Temp: 98.1 °F (36.7 °C)  Temp Source: Oral  Pulse (Heart Rate): 71  Resp Rate: 18  O2 Sat (%): 99 %  BP: 143/79  MAP (Calculated): 100  BP 1 Method: Automatic  BP 1 Location: Right arm  BP Patient Position: At rest     Cognitive Status:     Pain     Bed Mobility Training  Bed Mobility Training  Rolling: Supervision  Sit to Supine: Stand-by assistance  Balance  Sitting: Without support  Sitting - Static: Good (unsupported)  Sitting - Dynamic: Fair (occasional)  Standing: With support  Standing - Static: Fair  Standing - Dynamic : Fair  Transfer Training  Transfer Training  Sit to Stand: Contact guard assistance  Stand to Sit: Contact guard assistance  Sit to Stand: Contact guard assistance  Gait Training  Gait  Base of Support: Center of gravity altered;Narrowed  Speed/Elena: Slow  Step Length: Left shortened;Right shortened  Gait Abnormalities: Decreased step clearance  Ambulation - Level of Assistance: Contact guard assistance  Distance (ft): 50 Feet (ft) (x65)  Assistive Device: Brace/Splint;Gait belt;Walker, rolling     Gait Abnormalities: Decreased step clearance            With 2 turns. Therapeutic Exercise:   [place] with OT to maximize gains towards pt goals. Pt sitting in w/c upon arrival to room. Does not have back brace donned, education on wearing brace with all OOB activity. Ambulation for room to OT gym, with cuing for upright posture. Static standing balance x5' and SBA. Seated TE for LE strengthening and mobility: ben SOSA 2x20 reps of each. Ambulation with retrieving objects at various height with reacher x5'. Pt requires frequent cuing to maintain back precautions, tends to twist with reaching.     Patient/Caregiver Education:   Pt Deedee Dean Education on safety and fall prevention, education on back precautions to prevent injury. ASSESSMENT:  Patient continues to benefit from Skilled PT services to improve overall strength and mobility, improve gait and balance, improve pain, improve functional mobility  Progression toward goals:  [x]      Improving appropriately and progressing toward goals  []      Improving slowly and progressing toward goals  []      Not making progress toward goals and plan of care will be adjusted     Treatment session: 60 minutes.   Therapist:   Lacey Kramer PTA,          9/23/2018

## 2018-09-23 NOTE — ROUTINE PROCESS
Bedside and Verbal shift change report given to Ciaran Aguillon LPN (oncoming nurse) by Tish Rendon RN (offgoing nurse). Report included the following information SBAR, Kardex and MAR.    24 hour chart check

## 2018-09-23 NOTE — PROGRESS NOTES
Problem: Self Care Deficits Care Plan (Adult)  Goal: *Acute Goals and Plan of Care (Insert Text)  OCCUPATIONAL THERAPY SHORT TERM GOALS      Updated 9/21/18    1. Patient will perform Upper body ADL's without adaptive equipment with supervision/set-up. 2.  Patient will perform Lower body ADL's with adaptive equipment as needed with moderate assistance while adhering to back precautions. 3.  Patient will perform toileting task with moderate assistance with Good safety to reduce falls risk. 4.  Patient will perform functional transfers with Rolling Walker and CGA. 5.  Patient will perform standing static/dynamic balance activities for improved ADL/IADL function with CGA and Fair- balance and safety awareness. 6.  Patient will improve Barthel index scores to atleast 75/100 to improve functional mobility. Initiated 9/17/2018 and to be accomplished within 2 Week(s)    1. Patient will perform Upper body ADL's without adaptive equipment with supervision/set-up.(progressing-currently Min A)  2. Patient will perform Lower body ADL's with adaptive equipment as needed with moderate assistance while adhering to back precautions. (progressing-currently Max A)  3. Patient will perform toileting task with moderate assistance with Good safety to reduce falls risk. (progressing-currently Max A)  4. Patient will perform functional transfers with Rolling Walker and minimal assist. (goal met-[facility] CGA)  5. Patient will perform standing static/dynamic balance activities for improved ADL/IADL function with minimal assistance and Fair- balance and safety awareness. (goal met-[facility] CGA)  6. Patient will improve Barthel index scores to atleast 75/100 to improve functional mobility. (progressing)      OCCUPATIONAL THERAPY LONG TERM GOALS   Initiated 9/17/2018 and to be accomplished within 4 Week(s)    1. Patient will perform ADL's & IADLs with adaptive equipment as needed with modified independence.   2.  Patient will perform toileting task with modified independence with Good safety to reduce falls risk. 3.  Patient will perform all functional transfers utilizing least restrictive device and durable medical equipment as needed with modified independence and Good balance and safety awareness. 4.  Patient will perform standing static/dynamic activity for improved ADL/IADL function with modified independence and Good balance and safety awareness. 5.  Patient will improve Barthel index score to 95/100 to improve independence with mobility. Therapist: Mc Pollard    9/17/2018       Time Calculation: 39 mins     [facility]   Occupational Therapy Daily Treatment note      Patient: Jennifer Amezcua (70 y.o. male)       Date: 9/23/2018  Attending Physician: Katie Quiñonez MD  Primary Diagnosis: spinal stenosis    Treatment Diagnosis  Treatment Diagnosis: muscle weakness  Treatment Diagnosis 2: increased need for assist with self care    Precautions : Precautions at Admission: Back, Spinal, Fall (TLSO when OOB)  Vital Signs:  Vital Signs  Temp: 98.1 °F (36.7 °C)  Temp Source: Oral  Pulse (Heart Rate): 71  Resp Rate: 18  O2 Sat (%): 99 %  BP: 143/79  MAP (Calculated): 100  BP 1 Method: Automatic  BP 1 Location: Right arm  BP Patient Position: At rest     Bed Mobility:  Bed Mobility  Rolling: Supervision  Sit to Supine: Stand-by assistance  Transfers:  Functional Transfers  Sit to Stand: Contact guard assistance  Stand to Sit: Contact guard assistance     Balance:  Balance  Sitting: Without support  Sitting - Static: Good (unsupported)  Sitting - Dynamic: Fair (occasional)  Standing: With support  Standing - Static: Fair (+)  Standing - Dynamic : Fair  ADL Intervention:     Upper Body Dressing Assistance  Orthotics(Brace):  Minimum assistance  Therapeutic Activities:  Pt presented sitting upright in w/c without back brace applied, therapist educated on importance of having back brace on while OOB for increased support to allow for optimal healing. SBA fnxl room and hallway mobility with RW ~ 50 ft and 65 ft to increase activity tolerance and endurance. Pt stood ~ 5 mins Supervision with 1 UE support to complete table top activity to improve standing tolerance needed for ADL routine. Pt demo item retrieval task at floor level with RW utilizing reacher to comply with spinal precautions, pt required mod cueing to adhere to spinal precautions ie: twisting and reaching forward. Pt reported Rt hip pain 6/10 therefore limited standing and fnxl mobility tasks. Pt requested to return to supine post tx session performing fnxl bed mobility with min cueing for proper log roll. Patient/Caregiver Education:    Pt educated importance of OOB activity for increased strength and endurance. ASSESSMENT:  Patient continues to demonstrate the need for skilled Occupational Therapy services to improve safety awareness, balance, endurance, and strength. Progression toward goals:  [x]      Improving appropriately and progressing toward goals  []      Improving slowly and progressing toward goals  []      Not making progress toward goals and plan of care will be adjusted     Treatment session:   59 minutes, PT co tx to optimize safety with balance activities.      Therapist:    ROSANGELA Díaz,  9/23/2018

## 2018-09-24 LAB
GLUCOSE BLD STRIP.AUTO-MCNC: 146 MG/DL (ref 70–110)
GLUCOSE BLD STRIP.AUTO-MCNC: 155 MG/DL (ref 70–110)
GLUCOSE BLD STRIP.AUTO-MCNC: 159 MG/DL (ref 70–110)
GLUCOSE BLD STRIP.AUTO-MCNC: 169 MG/DL (ref 70–110)

## 2018-09-24 PROCEDURE — 74011636637 HC RX REV CODE- 636/637: Performed by: INTERNAL MEDICINE

## 2018-09-24 PROCEDURE — 74011250637 HC RX REV CODE- 250/637: Performed by: INTERNAL MEDICINE

## 2018-09-24 PROCEDURE — 82962 GLUCOSE BLOOD TEST: CPT

## 2018-09-24 RX ADMIN — RANOLAZINE 500 MG: 500 TABLET, FILM COATED, EXTENDED RELEASE ORAL at 18:29

## 2018-09-24 RX ADMIN — ACETAMINOPHEN 650 MG: 325 TABLET, FILM COATED ORAL at 08:31

## 2018-09-24 RX ADMIN — INSULIN LISPRO 2 UNITS: 100 INJECTION, SOLUTION INTRAVENOUS; SUBCUTANEOUS at 18:31

## 2018-09-24 RX ADMIN — METFORMIN HYDROCHLORIDE 500 MG: 500 TABLET ORAL at 08:31

## 2018-09-24 RX ADMIN — ATORVASTATIN CALCIUM 40 MG: 40 TABLET, FILM COATED ORAL at 22:07

## 2018-09-24 RX ADMIN — RIVAROXABAN 20 MG: 20 TABLET, FILM COATED ORAL at 18:30

## 2018-09-24 RX ADMIN — METOPROLOL TARTRATE 100 MG: 50 TABLET ORAL at 08:31

## 2018-09-24 RX ADMIN — INSULIN LISPRO 2 UNITS: 100 INJECTION, SOLUTION INTRAVENOUS; SUBCUTANEOUS at 11:30

## 2018-09-24 RX ADMIN — INSULIN LISPRO 2 UNITS: 100 INJECTION, SOLUTION INTRAVENOUS; SUBCUTANEOUS at 22:08

## 2018-09-24 RX ADMIN — DOCUSATE SODIUM 100 MG: 100 CAPSULE, LIQUID FILLED ORAL at 18:29

## 2018-09-24 RX ADMIN — RANOLAZINE 500 MG: 500 TABLET, FILM COATED, EXTENDED RELEASE ORAL at 08:31

## 2018-09-24 RX ADMIN — ASPIRIN 81 MG: 81 TABLET, COATED ORAL at 08:31

## 2018-09-24 RX ADMIN — NYSTATIN: 100000 POWDER TOPICAL at 18:31

## 2018-09-24 RX ADMIN — METFORMIN HYDROCHLORIDE 500 MG: 500 TABLET ORAL at 18:29

## 2018-09-24 RX ADMIN — DILTIAZEM HYDROCHLORIDE 180 MG: 180 CAPSULE, EXTENDED RELEASE ORAL at 08:31

## 2018-09-24 RX ADMIN — METOPROLOL TARTRATE 100 MG: 50 TABLET ORAL at 18:29

## 2018-09-24 RX ADMIN — INSULIN GLARGINE 10 UNITS: 100 INJECTION, SOLUTION SUBCUTANEOUS at 09:00

## 2018-09-24 RX ADMIN — HYDROCODONE BITARTRATE AND ACETAMINOPHEN 2 TABLET: 5; 325 TABLET ORAL at 23:42

## 2018-09-24 RX ADMIN — TAMSULOSIN HYDROCHLORIDE 0.4 MG: 0.4 CAPSULE ORAL at 22:07

## 2018-09-24 RX ADMIN — DOCUSATE SODIUM 100 MG: 100 CAPSULE, LIQUID FILLED ORAL at 08:31

## 2018-09-24 NOTE — PROGRESS NOTES
The patient was offered 1:1 activities with the  on September 24, 2018. The patient declined the activities. The  will continue to offer group and 1:1 activities and encourage the patient to participate in the activities.

## 2018-09-24 NOTE — PROGRESS NOTES
Problem: Mobility Impaired (Adult and Pediatric)  Goal: *Acute Goals and Plan of Care (Insert Text)  Physical Therapy Goals  Short term goals: Initiated 9/17/2018, Revised 9/18/18 and to be accomplished within 1-2 weeks (updated 9/21/18)    1. Patient will move from supine to sit and sit to supine  in bed with supervision/set-up. (Progressing; SBA)     2.  Patient will perform sit to stand with supervision/set-up. (Progressing; SBA)  3. Patient will transfer from bed to chair and chair to bed with supervision/set-up using the least restrictive device. (Progressing; CGA using RW)  4.  Patient will ambulate with supervision/set-up for 250 feet with the least restrictive device. (Progressing; CGA x 170 ft using RW)  5.  Patient will ascend/descend 4 stairs with right handrail(s), 15 stairs left handrail with close supervision/set-up. (Progressing; CGA x 5 stairs using B HR)  6. Patient will verbalize and demonstrate 3/3 lumbar precautions. (Progressing; cueing required for 2/3 lumbar precautions)   7. Patient will demonstrate compliance with lumbar precautions greater than 90% of session. (Achieved)      Long term goals:  Initiated 9/17/2018 and to be accomplished within 3-4 weeks. 1.  Patient will move from supine to sit and sit to supine  in bed with modified independence. 2.  Patient will transfer from bed to chair and chair to bed with modified independence using the least restrictive device. 3.  Patient will perform sit to stand with modified independence. 4.  Patient will ambulate with modified independence for 250 feet with the least restrictive device. 5.  Patient will ascend/descend 4 stairs with right handrail, 15 stairs with left handrail(s) with supervision. Physical Therapist: Dara Bundy, PT on 9/17/2018          [address] [place] [address]   physical Therapy Daily Treatment note      Patient:  Collin Murphy (10 y.o. male)               Date: 9/24/2018    Physician: Ventura Holcomb MD  Primary Diagnosis: spinal stenosis          Treatment Diagnosis  Treatment Diagnosis: muscle weakness  Treatment Diagnosis 2: increased need for assist with self care   Precautions: Back, Spinal, Fall (TLSO when OOB)  Vital Signs  Cognitive Status:  Pain  Pain Screen  Pain Scale 1: Numeric (0 - 10)  Pain Intensity 1: 0  Pain Onset 1: movement  Pain Location 1: Back  Pain Orientation 1: Lower  Pain Description 1: Aching  Pain Intervention(s) 1: Medication (see MAR); Repositioned; Rest  Patient Stated Pain Goal: 0  Pain Reassessment 1: Yes  Bed Mobility Training  Balance  Sitting: Without support  Sitting - Static: Good (unsupported)  Sitting - Dynamic: Fair (occasional) (((+)))  Standing: With support  Standing - Static: Fair (((+)))  Standing - Dynamic : Fair  Transfer Training  Transfer Training  Sit to Stand: Stand-by assistance  Stand to Sit: Stand-by assistance  Sit to Stand: Stand-by assistance  Gait Training  Gait  Base of Support: Center of gravity altered;Narrowed  Speed/Elena: Slow  Step Length: Right shortened;Left shortened  Gait Abnormalities: Decreased step clearance  Ambulation - Level of Assistance: Stand-by assistance  Distance (ft): 120 Feet (ft) (+100)  Assistive Device: Gait belt;Brace/Splint; Walker, rolling  Rail Use: Both  Stairs - Level of Assistance: Contact guard assistance  Number of Stairs Trained: 10  Interventions: Verbal cues  Gait Abnormalities: Decreased step clearance   With 3 turns. Therapeutic Exercise: Pt presented sitting in w/c at bedside with back brace donned. Pt reported feeling a little better and ready for therapy. Gait training rendered with abovementioned details and w/c follow for safety. Pt reported 5/10 R hip/LE pain with ambulation, but reported not requesting pain medication. Therapist educated pt on pain management. Pt stated \"I'm ok for now\". Pt with decreased step length R>L and moments of pausing before continuing to step.  Pt stated \"I have to think about stepping with my R side, it's not automatic yet\". Stair training rendered x10 steps with B HR and CGA, verbal cues for sequencing. Noted increased difficulty with descending vs ascending steps. Pt reported R hamstring cramping following stair training. Gentle R LE hamstring stretching rendered. Pt reported cramp dissipated following stretching and pt was able to continue with therapy session. Seated B LE LAQ, HR/TR x15. Noted some R foot shakiness with DF and increased effort required on R LE vs L LE for toe raises. Pt has no c/o of pain with TE's. Static standing with no UE support to intermittent 1 UE support and CGA/SBA. Pt stood for 1x4'40\", 1x2' with prolonged seated rest break due to fatigue. Patient/Caregiver Education:   Pt /Caregiver Education on safety and fall prevention, and reviewed lumbar precuations as pt has several attempts of twisting throughout session, was provided to maximize functional gains. ASSESSMENT:  Patient continues to benefit from Skilled PT services to improve sit<>stand, transfers, strength, balance, gait and stair negotiation. Progression toward goals:  [x]      Improving appropriately and progressing toward goals  []      Improving slowly and progressing toward goals  []      Not making progress toward goals and plan of care will be adjusted     Treatment session: 61 minutes.   Therapist:   Elizabeth Hopper PTA,          9/24/2018

## 2018-09-24 NOTE — PROGRESS NOTES
The patient was offered a group activity of listening to music played by a guitarist on September 24, 2018. The patient declined this activity due to wanting to sleep. The  will continue to offer group and 1:1 activities and encourage the patient to participate in the activities.

## 2018-09-24 NOTE — PROGRESS NOTES
Verbal shift change report given to [address] [place] [address] (oncoming nurse) by Maday (offgoing nurse). Report included the following information SBAR, Intake/Output and Recent Results.

## 2018-09-24 NOTE — PROGRESS NOTES
I have reviewed this patient's current medication list and recent laboratory results. At this time, I do not suggest any drug therapy adjustments or additional laboratory monitoring. Thank you,  Iwona REDDY  Ph. M. S.  9/24/2018

## 2018-09-25 LAB
GLUCOSE BLD STRIP.AUTO-MCNC: 117 MG/DL (ref 70–110)
GLUCOSE BLD STRIP.AUTO-MCNC: 124 MG/DL (ref 70–110)
GLUCOSE BLD STRIP.AUTO-MCNC: 147 MG/DL (ref 70–110)
GLUCOSE BLD STRIP.AUTO-MCNC: 184 MG/DL (ref 70–110)

## 2018-09-25 PROCEDURE — 74011636637 HC RX REV CODE- 636/637: Performed by: INTERNAL MEDICINE

## 2018-09-25 PROCEDURE — 74011250637 HC RX REV CODE- 250/637: Performed by: INTERNAL MEDICINE

## 2018-09-25 PROCEDURE — 82962 GLUCOSE BLOOD TEST: CPT

## 2018-09-25 RX ADMIN — RIVAROXABAN 20 MG: 20 TABLET, FILM COATED ORAL at 18:01

## 2018-09-25 RX ADMIN — ATORVASTATIN CALCIUM 40 MG: 40 TABLET, FILM COATED ORAL at 21:56

## 2018-09-25 RX ADMIN — RANOLAZINE 500 MG: 500 TABLET, FILM COATED, EXTENDED RELEASE ORAL at 09:06

## 2018-09-25 RX ADMIN — INSULIN GLARGINE 10 UNITS: 100 INJECTION, SOLUTION SUBCUTANEOUS at 09:06

## 2018-09-25 RX ADMIN — ACETAMINOPHEN 650 MG: 325 TABLET, FILM COATED ORAL at 09:06

## 2018-09-25 RX ADMIN — ASPIRIN 81 MG: 81 TABLET, COATED ORAL at 09:06

## 2018-09-25 RX ADMIN — NYSTATIN: 100000 POWDER TOPICAL at 18:06

## 2018-09-25 RX ADMIN — INSULIN LISPRO 2 UNITS: 100 INJECTION, SOLUTION INTRAVENOUS; SUBCUTANEOUS at 18:02

## 2018-09-25 RX ADMIN — HYDROCODONE BITARTRATE AND ACETAMINOPHEN 2 TABLET: 5; 325 TABLET ORAL at 21:57

## 2018-09-25 RX ADMIN — METFORMIN HYDROCHLORIDE 500 MG: 500 TABLET ORAL at 09:06

## 2018-09-25 RX ADMIN — DOCUSATE SODIUM 100 MG: 100 CAPSULE, LIQUID FILLED ORAL at 09:06

## 2018-09-25 RX ADMIN — TAMSULOSIN HYDROCHLORIDE 0.4 MG: 0.4 CAPSULE ORAL at 21:57

## 2018-09-25 RX ADMIN — DILTIAZEM HYDROCHLORIDE 180 MG: 180 CAPSULE, EXTENDED RELEASE ORAL at 09:06

## 2018-09-25 RX ADMIN — RANOLAZINE 500 MG: 500 TABLET, FILM COATED, EXTENDED RELEASE ORAL at 18:01

## 2018-09-25 RX ADMIN — METOPROLOL TARTRATE 100 MG: 50 TABLET ORAL at 18:01

## 2018-09-25 RX ADMIN — DOCUSATE SODIUM 100 MG: 100 CAPSULE, LIQUID FILLED ORAL at 18:01

## 2018-09-25 RX ADMIN — METOPROLOL TARTRATE 100 MG: 50 TABLET ORAL at 09:06

## 2018-09-25 RX ADMIN — METFORMIN HYDROCHLORIDE 500 MG: 500 TABLET ORAL at 18:01

## 2018-09-25 NOTE — PROGRESS NOTES
Problem: Self Care Deficits Care Plan (Adult)  Goal: *Acute Goals and Plan of Care (Insert Text)  OCCUPATIONAL THERAPY SHORT TERM GOALS      Updated 9/21/18    1. Patient will perform Upper body ADL's without adaptive equipment with supervision/set-up. 2.  Patient will perform Lower body ADL's with adaptive equipment as needed with moderate assistance while adhering to back precautions. 3.  Patient will perform toileting task with moderate assistance with Good safety to reduce falls risk. 4.  Patient will perform functional transfers with Rolling Walker and CGA. 5.  Patient will perform standing static/dynamic balance activities for improved ADL/IADL function with CGA and Fair- balance and safety awareness. 6.  Patient will improve Barthel index scores to atleast 75/100 to improve functional mobility. Initiated 9/17/2018 and to be accomplished within 2 Week(s)    1. Patient will perform Upper body ADL's without adaptive equipment with supervision/set-up.(progressing-currently Min A)  2. Patient will perform Lower body ADL's with adaptive equipment as needed with moderate assistance while adhering to back precautions. (progressing-currently Max A)  3. Patient will perform toileting task with moderate assistance with Good safety to reduce falls risk. (progressing-currently Max A)  4. Patient will perform functional transfers with Rolling Walker and minimal assist. (goal met-[facility] CGA)  5. Patient will perform standing static/dynamic balance activities for improved ADL/IADL function with minimal assistance and Fair- balance and safety awareness. (goal met-[facility] CGA)  6. Patient will improve Barthel index scores to atleast 75/100 to improve functional mobility. (progressing)      OCCUPATIONAL THERAPY LONG TERM GOALS   Initiated 9/17/2018 and to be accomplished within 4 Week(s)    1. Patient will perform ADL's & IADLs with adaptive equipment as needed with modified independence.   2.  Patient will perform toileting task with modified independence with Good safety to reduce falls risk. 3.  Patient will perform all functional transfers utilizing least restrictive device and durable medical equipment as needed with modified independence and Good balance and safety awareness. 4.  Patient will perform standing static/dynamic activity for improved ADL/IADL function with modified independence and Good balance and safety awareness. 5.  Patient will improve Barthel index score to 95/100 to improve independence with mobility. Therapist: William    9/17/2018       Time Calculation: 39 mins     [facility]   Occupational Therapy Daily Treatment note      Patient:  Sherry Lugo (70 y.o. male)       Date: 9/25/2018  Attending Physician: Kaylee Hendrickson MD  Primary Diagnosis: spinal stenosis    Treatment Diagnosis  Treatment Diagnosis: muscle weakness  Treatment Diagnosis 2: increased need for assist with self care    Precautions : Precautions at Admission: Back, Spinal, Fall (TLSO when OOB)  Vital Signs:        Cognitive Status:  Mental Status  Neurologic State: Alert  Orientation Level: Oriented X4  Cognition: Appropriate for age attention/concentration  Pain:  Pain Screen  Pain Scale 1: Visual  Pain Intensity 1: 0  Pain Onset 1: rest  Pain Location 1: Back  Pain Orientation 1: Lower  Pain Description 1: Aching  Pain Intervention(s) 1: Medication (see MAR)  Patient Stated Pain Goal: 0  Pain Reassessment 1: Patient sleeping  Pain Scale 1: Visual  Gross Assessment:     Coordination:     Bed Mobility:  Bed Mobility  Supine to Sit: Stand-by assistance  Transfers:  Functional Transfers  Sit to Stand: Stand-by assistance  Bed to Chair: Stand-by assistance     Balance:  Balance  Sitting: Without support  Sitting - Static: Good (unsupported)  Sitting - Dynamic: Fair (occasional)  Standing: With support  Standing - Static: Fair (+)  Standing - Dynamic : Fair  Therapeutic Activities:  Patient presents in bed resting however easy to arouse. Patient reports he was up in w/c however became dizzy and returned to bed independently. ADAMES educated patient on calling for assistance during transfers for optimal safety. Assisted patient with bed mobility and bed <>w/c transfers in order to assess safety and independence during task. See above for levels of A needed. Practiced self-propulsion from patient's room to therapy room in order to increase independence and performance during task. Patient able to complete with Supervision. Static standing activity with one hand release in order to increase standing balance and tolerance needed for ADLS. Patient was able to tolerate two trials of standing, 4 mins each, with SBA prior to requesting to sit. Patient reports increased pain during standing task however wishs to continue for optimal strengthening. Educated patient on bending knees during reaching at lower levels for optimal adherence to back precautions. Functional mobility utilizing RW from OT gym to patient's room to increase functional activity tolerance and safety during task. Patient able to complete mobility with SBA and 1 standing rest break. Patient/Caregiver Education:    Pt. Veto Hough Education on see above.       ASSESSMENT:  Patient continues to demonstrate the need for skilled Occupational Therapy services to improve dynamic standing balance needed for bathing  Progression toward goals:  [x]      Improving appropriately and progressing toward goals  []      Improving slowly and progressing toward goals  []      Not making progress toward goals and plan of care will be adjusted     Treatment session:   60 minutes    Therapist:    ROSANGELA Hoang,  9/25/2018

## 2018-09-25 NOTE — PROGRESS NOTES
Problem: Mobility Impaired (Adult and Pediatric)  Goal: *Acute Goals and Plan of Care (Insert Text)  Physical Therapy Goals  Short term goals: Initiated 9/17/2018, Revised 9/18/18 and to be accomplished within 1-2 weeks (updated 9/21/18)    1. Patient will move from supine to sit and sit to supine  in bed with supervision/set-up. (Progressing; SBA)     2.  Patient will perform sit to stand with supervision/set-up. (Progressing; SBA)  3. Patient will transfer from bed to chair and chair to bed with supervision/set-up using the least restrictive device. (Progressing; CGA using RW)  4.  Patient will ambulate with supervision/set-up for 250 feet with the least restrictive device. (Progressing; CGA x 170 ft using RW)  5.  Patient will ascend/descend 4 stairs with right handrail(s), 15 stairs left handrail with close supervision/set-up. (Progressing; CGA x 5 stairs using B HR)  6. Patient will verbalize and demonstrate 3/3 lumbar precautions. (Progressing; cueing required for 2/3 lumbar precautions)   7. Patient will demonstrate compliance with lumbar precautions greater than 90% of session. (Achieved)      Long term goals:  Initiated 9/17/2018 and to be accomplished within 3-4 weeks. 1.  Patient will move from supine to sit and sit to supine  in bed with modified independence. 2.  Patient will transfer from bed to chair and chair to bed with modified independence using the least restrictive device. 3.  Patient will perform sit to stand with modified independence. 4.  Patient will ambulate with modified independence for 250 feet with the least restrictive device. 5.  Patient will ascend/descend 4 stairs with right handrail, 15 stairs with left handrail(s) with supervision. Physical Therapist: Jose C Mai, PT on 9/17/2018          [address] [place] [address]   physical Therapy Daily Treatment note      Patient:  Tod Carmona (85 y.o. male)               Date: 9/25/2018    Physician: Flavia Thomas MD  Primary Diagnosis: spinal stenosis          Treatment Diagnosis  Treatment Diagnosis: muscle weakness  Treatment Diagnosis 2: increased need for assist with self care   Precautions: Back, Spinal, Fall (TLSO when OOB)  Vital Signs  Vital Signs  Temp: 98 °F (36.7 °C)  Temp Source: Oral  Pulse (Heart Rate): 72  Heart Rate Source: Monitor  Resp Rate: 19  O2 Sat (%): 99 %  Level of Consciousness: Alert  BP: 136/66  MAP (Calculated): 89  BP 1 Method: Automatic  BP 1 Location: Right arm  BP Patient Position: At rest  MEWS Score: 1  Cognitive Status:  Mental Status  Neurologic State: Alert  Orientation Level: Oriented X4  Cognition: Appropriate decision making; Appropriate for age attention/concentration; Appropriate safety awareness; Follows commands  Pain  Pain Screen  Pain Scale 1: Visual  Pain Intensity 1: 0  Patient Stated Pain Goal: 0  Bed Mobility Training  Bed Mobility Training  Supine to Sit: Stand-by assistance  Scooting: Supervision  Balance  Sitting: Without support  Sitting - Static: Good (unsupported)  Sitting - Dynamic: Fair (occasional) (((+)))  Standing: With support  Standing - Static: Fair (((+)))  Standing - Dynamic : Fair  Transfer Training  Transfer Training  Sit to Stand: Stand-by assistance  Stand to Sit: Stand-by assistance  Bed to Chair: Stand-by assistance  Sit to Stand: Stand-by assistance  Gait Training  Gait  Base of Support: Center of gravity altered  Speed/Elean: Slow  Step Length: Right shortened;Left shortened  Gait Abnormalities: Decreased step clearance  Ambulation - Level of Assistance:  (close (S) )  Distance (ft): 103 Feet (ft) (x2)  Assistive Device: Gait belt;Walker, rolling  Rail Use: Left   Stairs - Level of Assistance: Contact guard assistance  Number of Stairs Trained: 10 (side-stepping pattern. 5 steps with B HR and SBA )  Interventions: Safety awareness training;Verbal cues  Gait Abnormalities: Decreased step clearance   With 3 turns.   Therapeutic Exercise: Session One: Pt reported not feeling well and required assistance with sit>supine with cues to maintain lumbar precautions. Pt supine in bed, NSG present. Therapist to check back later. Session Two: Pt presented supine in bed. Pt reported feeling better. Pt enquired about DC date. Therapist discussed progression with goals and how pt would benefit from continued therapy for stair training, balance, strength and improved quality of ambulation. Pt reported his wife will be limited on amount of help she can provided as she had a back surgery not long ago also. Pt stated \"I understand, it just wanted to know if I had a date for next week to give me something to look forward to\". Pt reported continued pain, especially on R hip into R LE. Therapist advised pt discuss with NSG/physcian regarding pain and pain control. Gait training with abovementioned details and w/c follow. Therapist provided cues to promote step length. Pt attempted to follow cues, and noted several steps with improved step length. Pt then reported increased pain at R LE with step through and increased use of B UE's for support. Stair training rendered to include 5 steps with B HR and SBA. 5 steps with L HR and CGA, increased unsteadiness with descending steps. Therapist instructed pt on B UE support at L HR and side stepping pattern for improved safety and support. Pt completed 10 steps with L HR and side-stepping pattern with CGA. Seated B LAQ with added support for R LE for comfort, HR/TR 2x20. Pt ambulated back to his room from PT gym. Pt declined sitting up OOB. Pt requested to go back to bed. Sit>supine with SBA and cues for technique. Pt reported feeling nauseated and requested to know his current weight. Therapist reported to Amie. NSG to follow up with pt. Therapist notified pt.        Patient/Caregiver Education:   Pt /Caregiver Education on safety and fall prevention, and discussed DC planning with pt, no DC date at this time (see note above) was provided to maximize functional gains and reduce risk of falls. ASSESSMENT:  Patient continues to benefit from Skilled PT services to improve sit<>stand, gait, balance, and stair training. Progression toward goals:  [x]      Improving appropriately and progressing toward goals  []      Improving slowly and progressing toward goals  []      Not making progress toward goals and plan of care will be adjusted     Treatment session: 75 minutes.   Therapist:   Celeste Gallegos, PTA,          9/25/2018

## 2018-09-25 NOTE — ROUTINE PROCESS
Bedside and verbal shift report given to  Lalito RN (oncoming nurse) by  Erick Phan LPN (off going nurse). Report included SBAR, MAR and Kardex.

## 2018-09-25 NOTE — PROGRESS NOTES
Verbal shift change report given to Eleonora ZAPATAN (oncoming nurse) by Milla Ireland RN (offgoing nurse). Report included the following information SBAR, Intake/Output and Recent Results.

## 2018-09-25 NOTE — PROGRESS NOTES
SW called pt's wife to arrange a care plan meeting on Thurs 9/27/18 at 2:15pm. WOODY informed pt of the care plan meeting and provided him with and invitation to the meeting.

## 2018-09-25 NOTE — PROGRESS NOTES
The patient was offered a group activity of listening to an audio book ([address] [place] [address]) on September 25, 2018. The patient declined this activity. The  will continue to offer group and 1:1 activities and encourage the patient to participate in the activities.

## 2018-09-25 NOTE — PROGRESS NOTES
SW spoke with pt re: follow-up appt ortho appt today. Pt informed SW that he cancelled the appt because he \"couldn't possibly go in my condition\". SW informed pt that the PA for [address] indicated in the noted that he was suppose to follow-up in 1 month with Dr. Sally Saleh. Pt was in the OT gym when SW spoke with him.

## 2018-09-25 NOTE — ROUTINE PROCESS
Bedside and verbal shift report given to ROBERT Carcamo LPN (oncoming nurse) by ANEESH Husain LPN (off going nurse). Report included SBAR, MAR and Kardex.

## 2018-09-25 NOTE — PROGRESS NOTES
The patient was offered a group activity of participating in a craft group on September 25, 2018. The patient declined this activity. The  will continue to offer group and 1:1 activities and encourage the patient to participate in the activities.

## 2018-09-26 LAB
GLUCOSE BLD STRIP.AUTO-MCNC: 103 MG/DL (ref 70–110)
GLUCOSE BLD STRIP.AUTO-MCNC: 113 MG/DL (ref 70–110)
GLUCOSE BLD STRIP.AUTO-MCNC: 125 MG/DL (ref 70–110)
GLUCOSE BLD STRIP.AUTO-MCNC: 139 MG/DL (ref 70–110)

## 2018-09-26 PROCEDURE — 74011250637 HC RX REV CODE- 250/637: Performed by: INTERNAL MEDICINE

## 2018-09-26 PROCEDURE — 74011636637 HC RX REV CODE- 636/637: Performed by: INTERNAL MEDICINE

## 2018-09-26 PROCEDURE — 82962 GLUCOSE BLOOD TEST: CPT

## 2018-09-26 RX ADMIN — ASPIRIN 81 MG: 81 TABLET, COATED ORAL at 08:57

## 2018-09-26 RX ADMIN — RANOLAZINE 500 MG: 500 TABLET, FILM COATED, EXTENDED RELEASE ORAL at 08:57

## 2018-09-26 RX ADMIN — DOCUSATE SODIUM 100 MG: 100 CAPSULE, LIQUID FILLED ORAL at 17:44

## 2018-09-26 RX ADMIN — RIVAROXABAN 20 MG: 20 TABLET, FILM COATED ORAL at 17:44

## 2018-09-26 RX ADMIN — NYSTATIN: 100000 POWDER TOPICAL at 18:00

## 2018-09-26 RX ADMIN — METFORMIN HYDROCHLORIDE 500 MG: 500 TABLET ORAL at 17:44

## 2018-09-26 RX ADMIN — METOPROLOL TARTRATE 100 MG: 50 TABLET ORAL at 08:57

## 2018-09-26 RX ADMIN — ATORVASTATIN CALCIUM 40 MG: 40 TABLET, FILM COATED ORAL at 21:57

## 2018-09-26 RX ADMIN — RANOLAZINE 500 MG: 500 TABLET, FILM COATED, EXTENDED RELEASE ORAL at 17:44

## 2018-09-26 RX ADMIN — METOPROLOL TARTRATE 100 MG: 50 TABLET ORAL at 17:44

## 2018-09-26 RX ADMIN — METFORMIN HYDROCHLORIDE 500 MG: 500 TABLET ORAL at 08:57

## 2018-09-26 RX ADMIN — HYDROCODONE BITARTRATE AND ACETAMINOPHEN 2 TABLET: 5; 325 TABLET ORAL at 06:35

## 2018-09-26 RX ADMIN — DOCUSATE SODIUM 100 MG: 100 CAPSULE, LIQUID FILLED ORAL at 08:57

## 2018-09-26 RX ADMIN — NYSTATIN: 100000 POWDER TOPICAL at 09:00

## 2018-09-26 RX ADMIN — INSULIN GLARGINE 10 UNITS: 100 INJECTION, SOLUTION SUBCUTANEOUS at 08:59

## 2018-09-26 RX ADMIN — TAMSULOSIN HYDROCHLORIDE 0.4 MG: 0.4 CAPSULE ORAL at 21:57

## 2018-09-26 RX ADMIN — HYDROCODONE BITARTRATE AND ACETAMINOPHEN 2 TABLET: 5; 325 TABLET ORAL at 17:44

## 2018-09-26 RX ADMIN — DILTIAZEM HYDROCHLORIDE 180 MG: 180 CAPSULE, EXTENDED RELEASE ORAL at 08:57

## 2018-09-26 NOTE — ROUTINE PROCESS
Bedside and verbal shift report given to [address] (oncoming nurse) by  Dionte Puentes LPN (off going nurse). Report included SBAR, MAR and Kardex.

## 2018-09-26 NOTE — PROGRESS NOTES
Problem: Mobility Impaired (Adult and Pediatric)  Goal: *Acute Goals and Plan of Care (Insert Text)  Physical Therapy Goals  Short term goals: Initiated 9/17/2018, Revised 9/18/18 and to be accomplished within 1-2 weeks (updated 9/21/18)    1. Patient will move from supine to sit and sit to supine  in bed with supervision/set-up. (Progressing; SBA)     2.  Patient will perform sit to stand with supervision/set-up. (Progressing; SBA)  3. Patient will transfer from bed to chair and chair to bed with supervision/set-up using the least restrictive device. (Progressing; CGA using RW)  4.  Patient will ambulate with supervision/set-up for 250 feet with the least restrictive device. (Progressing; CGA x 170 ft using RW)  5.  Patient will ascend/descend 4 stairs with right handrail(s), 15 stairs left handrail with close supervision/set-up. (Progressing; CGA x 5 stairs using B HR)  6. Patient will verbalize and demonstrate 3/3 lumbar precautions. (Progressing; cueing required for 2/3 lumbar precautions)   7. Patient will demonstrate compliance with lumbar precautions greater than 90% of session. (Achieved)      Long term goals:  Initiated 9/17/2018 and to be accomplished within 3-4 weeks. 1.  Patient will move from supine to sit and sit to supine  in bed with modified independence. 2.  Patient will transfer from bed to chair and chair to bed with modified independence using the least restrictive device. 3.  Patient will perform sit to stand with modified independence. 4.  Patient will ambulate with modified independence for 250 feet with the least restrictive device. 5.  Patient will ascend/descend 4 stairs with right handrail, 15 stairs with left handrail(s) with supervision. Physical Therapist: Jose C Mai, PT on 9/17/2018          [address] [place] [address]   physical Therapy Daily Treatment note      Patient:  Tod Carmona (12 y.o. male)               Date: 9/26/2018    Physician: Flavia Thomas MD  Primary Diagnosis: spinal stenosis          Treatment Diagnosis  Treatment Diagnosis: muscle weakness  Treatment Diagnosis 2: increased need for assist with self care   Precautions: Back, Spinal, Fall (TLSO when OOB)  Vital Signs  Vital Signs  Temp: 97.8 °F (36.6 °C)  Temp Source: Oral  Pulse (Heart Rate): 63  Resp Rate: 20  O2 Sat (%): 98 %  BP: 146/66  Cognitive Status:  Mental Status  Neurologic State: Alert  Orientation Level: Oriented X4  Cognition: Follows commands  Pain  Pain Screen  Pain Scale 1: Numeric (0 - 10)  Pain Intensity 1: 3  Pain Location 1: Back  Pain Orientation 1: Lower  Pain Description 1: Aching  Pain Intervention(s) 1: Medication (see MAR)  Patient Stated Pain Goal: 0  Pain Reassessment 1: Yes  Bed Mobility Training  Balance  Sitting: Without support  Sitting - Static: Good (unsupported)  Sitting - Dynamic: Good (unsupported)  Standing: With support  Standing - Static: Good  Standing - Dynamic : Fair (((+)))  Transfer Training  Transfer Training  Sit to Stand: Modified independent  Stand to Sit: Modified independent  Sit to Stand: Modified independent  Gait Training  Gait  Base of Support: Center of gravity altered  Speed/Elena: Pace decreased (<100 feet/min)  Step Length: Right shortened;Left shortened  Gait Abnormalities: Decreased step clearance  Ambulation - Level of Assistance: Supervision  Distance (ft): 110 Feet (ft) (x2)  Assistive Device: Gait belt;Walker, rolling  Rail Use: Left   Stairs - Level of Assistance: Stand-by assistance  Number of Stairs Trained: 10 (+10 with B HR )  Interventions: Safety awareness training;Verbal cues  Gait Abnormalities: Decreased step clearance   With 3 turns. Therapeutic Exercise: Therapist reviewed lumbar precautions and the need for back brace donned when ambulating in room, pt now at Mod (I). Gait training with abovementioned details with (S).  Pt had no report of pain today and noted improved quality of ambulation, pt continues with decreased pace. Pt negotiated 10 steps with L HR and side-stepping pattern with no verbal cues required for sequencing and SBA. Pt negotiated 10 steps with B HR and SBA. Seated B LE TE's rendered to promote strength and endurance for improved quality of ambualtion and ease of stair negotiation: LAQ (pt reported cramp at R gastroc with exercise. Gentle seated hamstring/gastro stretch applied to relief discomfort), HR/TR 2x20. Static standing with 1 to no UE support and no noted LOB. Patient/Caregiver Education:   Pt /Caregiver Education on safety and fall prevention, and reviewed lumbar precautions and the need for back brace when walking in room at Mod (I). Pt verbalized understanding. Lumbar precautions written on board in pt's room, was provided to maximize functional gains. ASSESSMENT:  Patient continues to benefit from Skilled PT services to improve strength, balance, gait and stair negotiation. Progression toward goals:  [x]      Improving appropriately and progressing toward goals  []      Improving slowly and progressing toward goals  []      Not making progress toward goals and plan of care will be adjusted     Treatment session: 60 minutes.   Therapist:   Mushtaq Lucia PTA,          9/26/2018

## 2018-09-26 NOTE — ROUTINE PROCESS
Bedside and verbal shift report given to ROBERT Perry LPN (oncoming nurse) by ANEESH Carlson LPN (off going nurse). Report included SBAR, MAR and Kardex.

## 2018-09-26 NOTE — ROUTINE PROCESS
Rolled patient to side shirt pulled up to look at back, incision line clean and dry with a single vicryl thread coming from top and bottom of incision. Patient also has a skin tag noted. Patient denies itching.

## 2018-09-26 NOTE — PROGRESS NOTES
The patient was offered a social group activity of discussing current news and events on September 26, 2018. The patient did not participate in this activity due to being with therapy. The  will continue to offer group and 1:1 activities and encourage the patient to participate in the activities.

## 2018-09-26 NOTE — PROGRESS NOTES
Problem: Self Care Deficits Care Plan (Adult)  Goal: *Acute Goals and Plan of Care (Insert Text)  OCCUPATIONAL THERAPY SHORT TERM GOALS      Updated 9/21/18    1. Patient will perform Upper body ADL's without adaptive equipment with supervision/set-up. 2.  Patient will perform Lower body ADL's with adaptive equipment as needed with moderate assistance while adhering to back precautions. 3.  Patient will perform toileting task with moderate assistance with Good safety to reduce falls risk. 4.  Patient will perform functional transfers with Rolling Walker and CGA. 5.  Patient will perform standing static/dynamic balance activities for improved ADL/IADL function with CGA and Fair- balance and safety awareness. 6.  Patient will improve Barthel index scores to atleast 75/100 to improve functional mobility. Initiated 9/17/2018 and to be accomplished within 2 Week(s)    1. Patient will perform Upper body ADL's without adaptive equipment with supervision/set-up.(progressing-currently Min A)  2. Patient will perform Lower body ADL's with adaptive equipment as needed with moderate assistance while adhering to back precautions. (progressing-currently Max A)  3. Patient will perform toileting task with moderate assistance with Good safety to reduce falls risk. (progressing-currently Max A)  4. Patient will perform functional transfers with Rolling Walker and minimal assist. (goal met-[facility] CGA)  5. Patient will perform standing static/dynamic balance activities for improved ADL/IADL function with minimal assistance and Fair- balance and safety awareness. (goal met-[facility] CGA)  6. Patient will improve Barthel index scores to atleast 75/100 to improve functional mobility. (progressing)      OCCUPATIONAL THERAPY LONG TERM GOALS   Initiated 9/17/2018 and to be accomplished within 4 Week(s)    1. Patient will perform ADL's & IADLs with adaptive equipment as needed with modified independence.   2.  Patient will perform toileting task with modified independence with Good safety to reduce falls risk. 3.  Patient will perform all functional transfers utilizing least restrictive device and durable medical equipment as needed with modified independence and Good balance and safety awareness. 4.  Patient will perform standing static/dynamic activity for improved ADL/IADL function with modified independence and Good balance and safety awareness. 5.  Patient will improve Barthel index score to 95/100 to improve independence with mobility. Therapist: Javier Ott    9/17/2018       Time Calculation: 39 mins     [facility]   Occupational Therapy Daily Treatment note      Patient:  Naima Hurley (70 y.o. male)       Date: 9/26/2018  Attending Physician: Gonsalo Chatterjee MD  Primary Diagnosis: spinal stenosis    Treatment Diagnosis  Treatment Diagnosis: muscle weakness  Treatment Diagnosis 2: increased need for assist with self care    Precautions : Precautions at Admission: Back, Spinal, Fall (TLSO when OOB)  Vital Signs:        Cognitive Status:  Mental Status  Neurologic State: Alert  Orientation Level: Oriented X4  Cognition: Follows commands  Pain:  Pain Screen  Pain Scale 1: Numeric (0 - 10)  Pain Intensity 1: 3  Pain Location 1: Back  Pain Orientation 1: Lower  Pain Description 1: Aching  Pain Intervention(s) 1: Medication (see MAR)  Patient Stated Pain Goal: 0  Pain Reassessment 1: Yes  Pain Scale 1: Numeric (0 - 10)  Gross Assessment:     Coordination:     Bed Mobility:     Transfers:  Functional Transfers  Sit to Stand: Supervision  Toilet Transfer : Modified independent  Functional Transfers  Bathroom Mobility: Modified independent  Toilet Transfer : Modified independent  Balance:  Balance  Sitting: Without support  Sitting - Static: Good (unsupported)  Sitting - Dynamic: Fair (occasional) (+)  Standing: With support  Standing - Static: Fair (+)  ADL Self Care:     ADL Intervention:                 Lower Body Dressing Assistance  Dressing Assistance: Modified independent  Leg Crossed Method Used: No  Position Performed: Seated edge of bed  Adaptive Equipment Used: Reacher          Therapeutic Activities:  Patient presents in room, seated in w/c already dressed and bathed. Patient reports he completed independently, sinkside. Shadowed patient with LB dressing in order to determine appropriate AD and adherence to back precautions utilized for optimal safety and independence. Patient able to complete with Mod utilizing reacher and no cueing needed. Shadowed patient with bathroom mobility and toileting routine. utilizing RW  in order to assess safety and independence during routine. Patient able to complete mobility with Distant Supervision/Mod I and good safety awareness. ADAMES cleared patient to complete in room Mod I utilizing RW or w/c depending on how his pain level is. Patient verbalized understanding and argeed. Patient/Caregiver Education:    Joe Lora Console Education on see above.       ASSESSMENT:  Patient continues to demonstrate the need for skilled Occupational Therapy services to improve dynamic standing needed for bathing  Progression toward goals:  [x]      Improving appropriately and progressing toward goals  []      Improving slowly and progressing toward goals  []      Not making progress toward goals and plan of care will be adjusted     Treatment session:   62 minutes    Therapist:    ROSANGELA Borja,  9/26/2018

## 2018-09-26 NOTE — PROGRESS NOTES
The patient was offered a social group activity of [employer] on September 26, 2018. The patient declined this activity. The  will continue to offer group and 1:1 activities and encourage the patient to participate in the activities.

## 2018-09-27 LAB
GLUCOSE BLD STRIP.AUTO-MCNC: 116 MG/DL (ref 70–110)
GLUCOSE BLD STRIP.AUTO-MCNC: 139 MG/DL (ref 70–110)
GLUCOSE BLD STRIP.AUTO-MCNC: 154 MG/DL (ref 70–110)
GLUCOSE BLD STRIP.AUTO-MCNC: 187 MG/DL (ref 70–110)

## 2018-09-27 PROCEDURE — 74011636637 HC RX REV CODE- 636/637: Performed by: INTERNAL MEDICINE

## 2018-09-27 PROCEDURE — 74011250637 HC RX REV CODE- 250/637: Performed by: INTERNAL MEDICINE

## 2018-09-27 PROCEDURE — 82962 GLUCOSE BLOOD TEST: CPT

## 2018-09-27 RX ADMIN — METFORMIN HYDROCHLORIDE 500 MG: 500 TABLET ORAL at 08:47

## 2018-09-27 RX ADMIN — METFORMIN HYDROCHLORIDE 500 MG: 500 TABLET ORAL at 20:05

## 2018-09-27 RX ADMIN — TAMSULOSIN HYDROCHLORIDE 0.4 MG: 0.4 CAPSULE ORAL at 21:14

## 2018-09-27 RX ADMIN — RANOLAZINE 500 MG: 500 TABLET, FILM COATED, EXTENDED RELEASE ORAL at 20:05

## 2018-09-27 RX ADMIN — RANOLAZINE 500 MG: 500 TABLET, FILM COATED, EXTENDED RELEASE ORAL at 08:47

## 2018-09-27 RX ADMIN — INSULIN LISPRO 2 UNITS: 100 INJECTION, SOLUTION INTRAVENOUS; SUBCUTANEOUS at 21:37

## 2018-09-27 RX ADMIN — DOCUSATE SODIUM 100 MG: 100 CAPSULE, LIQUID FILLED ORAL at 20:04

## 2018-09-27 RX ADMIN — DILTIAZEM HYDROCHLORIDE 180 MG: 180 CAPSULE, EXTENDED RELEASE ORAL at 08:47

## 2018-09-27 RX ADMIN — RIVAROXABAN 20 MG: 20 TABLET, FILM COATED ORAL at 20:04

## 2018-09-27 RX ADMIN — ATORVASTATIN CALCIUM 40 MG: 40 TABLET, FILM COATED ORAL at 21:14

## 2018-09-27 RX ADMIN — HYDROCODONE BITARTRATE AND ACETAMINOPHEN 2 TABLET: 5; 325 TABLET ORAL at 08:47

## 2018-09-27 RX ADMIN — INSULIN GLARGINE 10 UNITS: 100 INJECTION, SOLUTION SUBCUTANEOUS at 08:46

## 2018-09-27 RX ADMIN — METOPROLOL TARTRATE 100 MG: 50 TABLET ORAL at 08:47

## 2018-09-27 RX ADMIN — INSULIN LISPRO 2 UNITS: 100 INJECTION, SOLUTION INTRAVENOUS; SUBCUTANEOUS at 17:39

## 2018-09-27 RX ADMIN — ASPIRIN 81 MG: 81 TABLET, COATED ORAL at 08:47

## 2018-09-27 RX ADMIN — METOPROLOL TARTRATE 100 MG: 50 TABLET ORAL at 20:04

## 2018-09-27 RX ADMIN — DOCUSATE SODIUM 100 MG: 100 CAPSULE, LIQUID FILLED ORAL at 08:47

## 2018-09-27 RX ADMIN — HYDROCODONE BITARTRATE AND ACETAMINOPHEN 2 TABLET: 5; 325 TABLET ORAL at 20:14

## 2018-09-27 NOTE — PROGRESS NOTES
Problem: Mobility Impaired (Adult and Pediatric)  Goal: *Acute Goals and Plan of Care (Insert Text)  Physical Therapy Goals  Short term goals: Initiated 9/17/2018, Revised 9/18/18 and to be accomplished within 1-2 weeks (updated 9/21/18)    1. Patient will move from supine to sit and sit to supine  in bed with supervision/set-up. (Progressing; SBA)     2.  Patient will perform sit to stand with supervision/set-up. (Progressing; SBA)  3. Patient will transfer from bed to chair and chair to bed with supervision/set-up using the least restrictive device. (Progressing; CGA using RW)  4.  Patient will ambulate with supervision/set-up for 250 feet with the least restrictive device. (Progressing; CGA x 170 ft using RW)  5.  Patient will ascend/descend 4 stairs with right handrail(s), 15 stairs left handrail with close supervision/set-up. (Progressing; CGA x 5 stairs using B HR)  6. Patient will verbalize and demonstrate 3/3 lumbar precautions. (Progressing; cueing required for 2/3 lumbar precautions)   7. Patient will demonstrate compliance with lumbar precautions greater than 90% of session. (Achieved)      Long term goals:  Initiated 9/17/2018 and to be accomplished within 3-4 weeks. 1.  Patient will move from supine to sit and sit to supine  in bed with modified independence. 2.  Patient will transfer from bed to chair and chair to bed with modified independence using the least restrictive device. 3.  Patient will perform sit to stand with modified independence. 4.  Patient will ambulate with modified independence for 250 feet with the least restrictive device. 5.  Patient will ascend/descend 4 stairs with right handrail, 15 stairs with left handrail(s) with supervision. Physical Therapist: Ling Gutierrez, PT on 9/17/2018          [address] [place] [address]   physical Therapy Daily Treatment note      Patient:  Bhaskar Ray (40 y.o. male)               Date: 9/27/2018    Physician: Priscilla Braswell, MD  Primary Diagnosis: spinal stenosis          Treatment Diagnosis  Treatment Diagnosis: muscle weakness  Treatment Diagnosis 2: increased need for assist with self care   Precautions: Back, Spinal, Fall (TLSO when OOB)  Vital Signs  Vital Signs  Temp: 98 °F (36.7 °C)  Temp Source: Oral  Pulse (Heart Rate): 68  Resp Rate: 18  O2 Sat (%): 98 %  BP: 163/65  MAP (Calculated): 98  BP 1 Method: Automatic  BP 1 Location: Right arm  BP Patient Position: At rest  Cognitive Status:  Mental Status  Neurologic State: Alert  Orientation Level: Oriented X4  Cognition: Follows commands  Pain  Pain Screen  Pain Scale 1: Numeric (0 - 10)  Pain Intensity 1: 8  Pain Location 1: Back  Pain Orientation 1: Lower  Pain Description 1: Aching  Pain Intervention(s) 1: Medication (see MAR); Distraction;Food;Repositioned  Bed Mobility Training  Balance  Sitting: Without support  Sitting - Static: Good (unsupported)  Sitting - Dynamic: Good (unsupported)  Standing: With support  Standing - Static: Good  Standing - Dynamic : Fair (((+)))  Transfer Training  Transfer Training  Sit to Stand: Modified independent  Stand to Sit: Modified independent  Sit to Stand: Modified independent  Gait Training  Gait  Base of Support: Center of gravity altered  Speed/Elena: Slow  Step Length: Right shortened;Left shortened  Gait Abnormalities: Decreased step clearance  Ambulation - Level of Assistance: Supervision  Distance (ft): 200 Feet (ft)  Assistive Device: Gait belt;Walker, rolling  Rail Use: Left   Stairs - Level of Assistance: Stand-by assistance  Number of Stairs Trained: 10  Interventions: Safety awareness training;Verbal cues  Gait Abnormalities: Decreased step clearance  With 3 turns. Therapeutic Exercise: Therapist attended pt care meeting with pt, pt's wife, ADAMES, [address], rehab manager and MDS coordinator. Therapist reported on pt's progress with therapy. Pt had made good progress and has a DC date of 10/4/18.  Pt's wife present during gait training x200ft. Pt negotiated 10 steps with L HR and side-stepping pattern with SBA and good safety awareness. Seated B LE TE's rendered to include LAQ, HR/TR 2x20. Pt ambulated an additional 100ft at end of session with RW and (S). Pt remained sitting up in w/c at bedside with wife. Patient/Caregiver Education:   Pt /Caregiver Education on safety and fall prevention, DC planning (see note above) was provided. ASSESSMENT:  Patient continues to benefit from Skilled PT services for 5-7 additional sessions with DC planned for 10/4/18  Progression toward goals:  [x]      Improving appropriately and progressing toward goals  []      Improving slowly and progressing toward goals  []      Not making progress toward goals and plan of care will be adjusted     Treatment session: 56 minutes.   Therapist:   Sheryl Singh PTA,          9/27/2018

## 2018-09-27 NOTE — PROGRESS NOTES
Problem: Self Care Deficits Care Plan (Adult)  Goal: *Acute Goals and Plan of Care (Insert Text)  OCCUPATIONAL THERAPY SHORT TERM GOALS      Updated 9/21/18    1. Patient will perform Upper body ADL's without adaptive equipment with supervision/set-up. 2.  Patient will perform Lower body ADL's with adaptive equipment as needed with moderate assistance while adhering to back precautions. 3.  Patient will perform toileting task with moderate assistance with Good safety to reduce falls risk. 4.  Patient will perform functional transfers with Rolling Walker and CGA. 5.  Patient will perform standing static/dynamic balance activities for improved ADL/IADL function with CGA and Fair- balance and safety awareness. 6.  Patient will improve Barthel index scores to atleast 75/100 to improve functional mobility. Initiated 9/17/2018 and to be accomplished within 2 Week(s)    1. Patient will perform Upper body ADL's without adaptive equipment with supervision/set-up.(progressing-currently Min A)  2. Patient will perform Lower body ADL's with adaptive equipment as needed with moderate assistance while adhering to back precautions. (progressing-currently Max A)  3. Patient will perform toileting task with moderate assistance with Good safety to reduce falls risk. (progressing-currently Max A)  4. Patient will perform functional transfers with Rolling Walker and minimal assist. (goal met-[facility] CGA)  5. Patient will perform standing static/dynamic balance activities for improved ADL/IADL function with minimal assistance and Fair- balance and safety awareness. (goal met-[facility] CGA)  6. Patient will improve Barthel index scores to atleast 75/100 to improve functional mobility. (progressing)      OCCUPATIONAL THERAPY LONG TERM GOALS   Initiated 9/17/2018 and to be accomplished within 4 Week(s)    1. Patient will perform ADL's & IADLs with adaptive equipment as needed with modified independence.   2.  Patient will perform toileting task with modified independence with Good safety to reduce falls risk. 3.  Patient will perform all functional transfers utilizing least restrictive device and durable medical equipment as needed with modified independence and Good balance and safety awareness. 4.  Patient will perform standing static/dynamic activity for improved ADL/IADL function with modified independence and Good balance and safety awareness. 5.  Patient will improve Barthel index score to 95/100 to improve independence with mobility. Therapist: Charles Tinsley    9/17/2018       Time Calculation: 39 mins     [facility]   Occupational Therapy Daily Treatment note      Patient: Jose Rafael Colon (70 y.o. male)       Date: 9/27/2018  Attending Physician: Brandy Cook MD  Primary Diagnosis: spinal stenosis    Treatment Diagnosis  Treatment Diagnosis: muscle weakness  Treatment Diagnosis 2: increased need for assist with self care    Precautions : Precautions at Admission: Back, Spinal, Fall (TLSO when OOB)  Vital Signs:  Vital Signs  Temp: 98 °F (36.7 °C)  Temp Source: Oral  Pulse (Heart Rate): 68  Resp Rate: 18  O2 Sat (%): 98 %  BP: 163/65  MAP (Calculated): 98  BP 1 Method: Automatic  BP 1 Location: Right arm  BP Patient Position: At rest     Cognitive Status:  Mental Status  Neurologic State: Alert  Orientation Level: Oriented X4  Cognition: Follows commands  Pain:  Pain Screen  Pain Scale 1: Numeric (0 - 10)  Pain Intensity 1: 8  Pain Location 1: Back  Pain Orientation 1: Lower  Pain Description 1: Aching  Pain Intervention(s) 1: Medication (see MAR); Distraction;Food;Repositioned  Pain Scale 1: Numeric (0 - 10)  Gross Assessment:     Coordination:     Bed Mobility:     Transfers:  Functional Transfers  Sit to Stand: Modified independent  Stand to Sit: Modified independent     Balance:  Balance  Sitting: Without support  Sitting - Static: Good (unsupported)  Sitting - Dynamic: Good (unsupported)  Standing: With support  Standing - Static: Good  Standing - Dynamic : Fair (((+)))       Therapeutic Activities:  Collaborated with PT, SW, patients family and patient regarding current progression towards OT goals and discharge recommendations. ADAMES informed patient's wife, patient has made significant progression towards OT goals and Mod I with all ADL tasks and transfers. Functional mobility utilizing RW from patient's room to therapy room in order to increase functional activity tolerance and independence during task. Patient able to complete mobility with close Supervision with over 200ft. Patient/Caregiver Education:    Pt. Al Bloodgood Education on see above.       ASSESSMENT:  Patient continues to demonstrate the need for skilled Occupational Therapy services to improve dynamic standing balance needed for bathing  Progression toward goals:  [x]      Improving appropriately and progressing toward goals  []      Improving slowly and progressing toward goals  []      Not making progress toward goals and plan of care will be adjusted     Treatment session:   61 minutes    Therapist:    ROSANGELA Rojas,  9/27/2018

## 2018-09-27 NOTE — PROGRESS NOTES
Late entry: care plan meeting held with pt and his wife with SW, therapy supervisor, MDS coordinator, PTA and ADAMES to discuss pt's progress and d/c needs. Pt has improved with therapy and d/c date of 10/4/18 was given. Pt has an appt with ortho on 10/2/18 at 11:20am with Dr. Terri Pierre. WOODY informed pt and his wife that pt will have a copayment of $ 167.50 per day on 10/4/18 if he isn't discharged. Pt's wife remains supportive and willing to assist with d/c planning. SW will follow.

## 2018-09-27 NOTE — PROGRESS NOTES
Nutrition follow-up/Plan of care tcc      RECOMMENDATIONS:   1. Consistent CHO Diet  2. Monitor weight, labs and PO intake  3. RD to follow     GOALS:   1. Ongoing; PO intake meets >75% of protein/calorie needs by 10/4  2. Met/Ongoing: Weight Maintenance (+/- 1-2) by 10/4       ASSESSMENT:   Wt status is classified as obese per BMI of 31.5. Variable PO intake. Labs noted. BG range (103-139) over the past 24 hours; A1C (6.9). Nutrition recommendations listed. RD to follow. Nutrition Risk:  [] High  [x] Moderate []  Low    SUBJECTIVE/OBJECTIVE:   (9/12): Pt admitted for lumbar stenosis. PMHx including CAD, HTN, DM, Afib and Meniere's disease. Pt seen in room OOB in chair eating lunch; with wife at bedside. Wife brought in some food from home. Observed ~25% of meal consumed during visit. Reports having a poor appetite for the past week (most likely d/t ileus; now resolved). Stated that the smell of food makes him nauseous and he has the hiccups. Denies having any food allergies or problems chewing/swallowing. Stated appetite is normally good at home. Does not want nutritional supplements at this time. Encouraged intake and will monitor.     (9/16): [facility]red from [facility] to [address] [place] [address],[address] on 9/14/2018. States UBW of 190 lb. Stated appetite is improving here in hospital and was eating well at home. Observed 50% intake of lunch meal during visit. States he doesn't like to eat a lot of \"starchy\" foods and stated food preferences. Discussed preferences with CA to be implemented. Encouraged intake and will monitor. (9/20): Pt seen in room after lunch with wife at bedside. Observed 100% of meal consumed, but it was a light lunch. Reports appetite is slowly improving. Weights noted. Will monitor. (9/27): Pt seen in room OOB in chair after lunch. Reports he is doing well and his appetite has improved. Stated he ate most of his lunch today. Glycemic control is following him. Nothing to address at this time. Will monitor. Information Obtained from:    [x] Chart Review   [x] Patient   [] Family/Caregiver   [] Nurse/Physician   [] Interdisciplinary Meeting/Rounds      Diet: Consistent CHO  Medications: [x] Reviewed  Lipitor, Cardizem, Colace, Lopressor,Ranexa, Xarelto     Allergies: [x] Reviewed   Patient Active Problem List   Diagnosis Code    Lumbar stenosis M48.061    Paroxysmal A-fib (HCC) I48.0    Microcytic anemia D50.9    DM (diabetes mellitus) ([facility] E11.9    HTN (hypertension) I10    CAD (coronary artery disease) I25.10    Hiccup R06.6    Ileus ([facility] K56.7    Urine retention R33.9    Normal cardiac stress test Z13.6       Past Medical History:   Diagnosis Date    Atrial fibrillation ([facility]     CAD (coronary artery disease)     Diabetes ([facility]     Hypertension     Meniere's disease       Labs:    Lab Results   Component Value Date/Time    Sodium 139 09/13/2018 03:00 AM    Potassium 4.1 09/13/2018 03:00 AM    Chloride 103 09/13/2018 03:00 AM    CO2 28 09/13/2018 03:00 AM    Anion gap 8 09/13/2018 03:00 AM    Glucose 209 (H) 09/13/2018 03:00 AM    BUN 11 09/13/2018 03:00 AM    Creatinine 1.12 09/13/2018 03:00 AM    Calcium 7.8 (L) 09/13/2018 03:00 AM    Magnesium 1.4 (L) 12/04/2010 04:05 AM    Albumin 2.6 (L) 09/08/2018 04:00 AM     Anthropometrics: BMI (calculated): 31.5  Last 3 Recorded Weights in this Encounter    09/14/18 1726 09/19/18 0817 09/25/18 1620   Weight: 92 kg (202 lb 12.8 oz) 88.8 kg (195 lb 12.8 oz) 88.4 kg (194 lb 12.8 oz)      Ht Readings from Last 1 Encounters:   09/14/18 5' 6\" (1.676 m)     Weight Metrics 9/25/2018 9/13/2018 9/5/2018   Weight 194 lb 12.8 oz 208 lb -   BMI 31.44 kg/m2 - 33.57 kg/m2       No data found.        [] Weight Loss  [] Weight Gain   [x] Weight Stable    Estimated Nutrition Needs: [x] MSJ  [] Other:  Calories: 0378-9621 kcal Based on:   [x] Actual BW    Protein:   94 g Based on:   [x] Actual BW    Fluid:       2200- 2364 ml Based on:   [x] Actual BW        Nutrition Problems Identified:   [x] Suboptimal PO intake (improving)  [] Food Allergies  [] Difficulty chewing/swallowing/poor dentition  [] Constipation/Diarrhea   [] Nausea/Vomiting   [] None  [] Other:     Plan:   [x] Therapeutic Diet  []  Obtained/adjusted food preferences/tolerances and/or snacks options   []  Supplements added   [] Occupational therapy following for feeding techniques  []  HS snack added   []  Modify diet texture   []  Modify diet for food allergies   []  Educate patient   []  Assist with menu selection   [x]  Monitor PO intake on meal rounds   [x]  Continue inpatient monitoring and intervention   [x]  Participated in discharge planning/Interdisciplinary rounds/Team meetings   []  Other:     Education Needs:   [] Not appropriate for teaching at this time due to:   [x] Identified and addressed    Nutrition Monitoring and Evaluation:  [x] Continue ongoing monitoring and intervention  [] Other    Migue Gutierrez

## 2018-09-27 NOTE — PROGRESS NOTES
conducted a Follow up consultation and Spiritual Assessment for Erick Bhatt, who is a 70 y.o.,male. The  provided the following Interventions:  Continued the relationship of care and support. Listened empathically. Offered prayer and assurance of continued prayer on patients behalf. Chart reviewed. The following outcomes were achieved:  Patient expressed gratitude for 's visit. Assessment:  There are no spiritual or Adventism issues which require intervention at this time. Plan:  Chaplains will continue to follow and will provide pastoral care on an as needed/requested basis.  recommends bedside caregivers page  on duty if patient shows signs of acute spiritual or emotional distress. Jeimy Carbajal M.Div.   , [address] [facility]: [phone]/[facility]: [phone]

## 2018-09-27 NOTE — ROUTINE PROCESS
Bedside and verbal shift report given to Britta Guerin LPN (oncoming nurse) by ANEESH Soto LPN (off going nurse). Report included SBAR, MAR and Kardex.

## 2018-09-28 LAB
GLUCOSE BLD STRIP.AUTO-MCNC: 105 MG/DL (ref 70–110)
GLUCOSE BLD STRIP.AUTO-MCNC: 173 MG/DL (ref 70–110)
GLUCOSE BLD STRIP.AUTO-MCNC: 190 MG/DL (ref 70–110)
GLUCOSE BLD STRIP.AUTO-MCNC: 195 MG/DL (ref 70–110)

## 2018-09-28 PROCEDURE — 74011636637 HC RX REV CODE- 636/637: Performed by: INTERNAL MEDICINE

## 2018-09-28 PROCEDURE — 74011250637 HC RX REV CODE- 250/637: Performed by: INTERNAL MEDICINE

## 2018-09-28 PROCEDURE — 82962 GLUCOSE BLOOD TEST: CPT

## 2018-09-28 RX ORDER — DILTIAZEM HYDROCHLORIDE 120 MG/1
120 CAPSULE, COATED, EXTENDED RELEASE ORAL DAILY
Status: DISCONTINUED | OUTPATIENT
Start: 2018-09-29 | End: 2018-10-04 | Stop reason: HOSPADM

## 2018-09-28 RX ORDER — INSULIN GLARGINE 100 [IU]/ML
12 INJECTION, SOLUTION SUBCUTANEOUS DAILY
Status: DISCONTINUED | OUTPATIENT
Start: 2018-09-29 | End: 2018-10-04 | Stop reason: HOSPADM

## 2018-09-28 RX ADMIN — INSULIN LISPRO 2 UNITS: 100 INJECTION, SOLUTION INTRAVENOUS; SUBCUTANEOUS at 17:18

## 2018-09-28 RX ADMIN — DOCUSATE SODIUM 100 MG: 100 CAPSULE, LIQUID FILLED ORAL at 08:00

## 2018-09-28 RX ADMIN — ASPIRIN 81 MG: 81 TABLET, COATED ORAL at 08:00

## 2018-09-28 RX ADMIN — TAMSULOSIN HYDROCHLORIDE 0.4 MG: 0.4 CAPSULE ORAL at 22:04

## 2018-09-28 RX ADMIN — ATORVASTATIN CALCIUM 40 MG: 40 TABLET, FILM COATED ORAL at 22:04

## 2018-09-28 RX ADMIN — DOCUSATE SODIUM 100 MG: 100 CAPSULE, LIQUID FILLED ORAL at 17:17

## 2018-09-28 RX ADMIN — RIVAROXABAN 20 MG: 20 TABLET, FILM COATED ORAL at 17:17

## 2018-09-28 RX ADMIN — RANOLAZINE 500 MG: 500 TABLET, FILM COATED, EXTENDED RELEASE ORAL at 08:00

## 2018-09-28 RX ADMIN — INSULIN LISPRO 2 UNITS: 100 INJECTION, SOLUTION INTRAVENOUS; SUBCUTANEOUS at 07:58

## 2018-09-28 RX ADMIN — NYSTATIN: 100000 POWDER TOPICAL at 18:00

## 2018-09-28 RX ADMIN — DILTIAZEM HYDROCHLORIDE 180 MG: 180 CAPSULE, EXTENDED RELEASE ORAL at 08:00

## 2018-09-28 RX ADMIN — HYDROCODONE BITARTRATE AND ACETAMINOPHEN 2 TABLET: 5; 325 TABLET ORAL at 08:00

## 2018-09-28 RX ADMIN — INSULIN GLARGINE 10 UNITS: 100 INJECTION, SOLUTION SUBCUTANEOUS at 08:00

## 2018-09-28 RX ADMIN — NYSTATIN: 100000 POWDER TOPICAL at 09:00

## 2018-09-28 RX ADMIN — HYDROCODONE BITARTRATE AND ACETAMINOPHEN 2 TABLET: 5; 325 TABLET ORAL at 22:10

## 2018-09-28 RX ADMIN — METOPROLOL TARTRATE 100 MG: 50 TABLET ORAL at 17:17

## 2018-09-28 RX ADMIN — METFORMIN HYDROCHLORIDE 500 MG: 500 TABLET ORAL at 17:17

## 2018-09-28 RX ADMIN — RANOLAZINE 500 MG: 500 TABLET, FILM COATED, EXTENDED RELEASE ORAL at 18:00

## 2018-09-28 RX ADMIN — INSULIN LISPRO 2 UNITS: 100 INJECTION, SOLUTION INTRAVENOUS; SUBCUTANEOUS at 12:09

## 2018-09-28 RX ADMIN — METFORMIN HYDROCHLORIDE 500 MG: 500 TABLET ORAL at 08:00

## 2018-09-28 RX ADMIN — HYDROCODONE BITARTRATE AND ACETAMINOPHEN 2 TABLET: 5; 325 TABLET ORAL at 15:41

## 2018-09-28 RX ADMIN — METOPROLOL TARTRATE 100 MG: 50 TABLET ORAL at 08:00

## 2018-09-28 NOTE — ROUTINE PROCESS
Bedside and Verbal shift change report given to Cliff Foster (oncoming nurse) by Andres Rangel LPN  (offgoing nurse). Report included the following information SBAR, Kardex, MAR and Recent Results.

## 2018-09-28 NOTE — ROUTINE PROCESS
Earlier this AM patient complained of heart fluttering stating I have Afib and I need my morning medication. Heart rate functionating  B/P120/82. Am medications given. At 0900 heart rate 98. At 0930 B/p 88/46  Heart rate 76, no further fluttering, but patient is dizzy, assisted to bed cool cloth to forehead. At 1100 patient stated no more dizziness felt better, heart rate 68 B/P 99/62. Dr. Sonja Ya called and spoke with him, made him aware of patient condition and symptoms this AM. New telephone order received.

## 2018-09-28 NOTE — PROGRESS NOTES
Problem: Self Care Deficits Care Plan (Adult)  Goal: *Acute Goals and Plan of Care (Insert Text)  OCCUPATIONAL THERAPY SHORT TERM GOALS      Updated 9/21/18    1. Patient will perform Upper body ADL's without adaptive equipment with supervision/set-up. 2.  Patient will perform Lower body ADL's with adaptive equipment as needed with moderate assistance while adhering to back precautions. 3.  Patient will perform toileting task with moderate assistance with Good safety to reduce falls risk. 4.  Patient will perform functional transfers with Rolling Walker and CGA. 5.  Patient will perform standing static/dynamic balance activities for improved ADL/IADL function with CGA and Fair- balance and safety awareness. 6.  Patient will improve Barthel index scores to atleast 75/100 to improve functional mobility. Initiated 9/17/2018 and to be accomplished within 2 Week(s)    1. Patient will perform Upper body ADL's without adaptive equipment with supervision/set-up.(progressing-currently Min A)  2. Patient will perform Lower body ADL's with adaptive equipment as needed with moderate assistance while adhering to back precautions. (progressing-currently Max A)  3. Patient will perform toileting task with moderate assistance with Good safety to reduce falls risk. (progressing-currently Max A)  4. Patient will perform functional transfers with Rolling Walker and minimal assist. (goal met-[facility] CGA)  5. Patient will perform standing static/dynamic balance activities for improved ADL/IADL function with minimal assistance and Fair- balance and safety awareness. (goal met-[facility] CGA)  6. Patient will improve Barthel index scores to atleast 75/100 to improve functional mobility. (progressing)      OCCUPATIONAL THERAPY LONG TERM GOALS   Initiated 9/17/2018 and to be accomplished within 4 Week(s)    1. Patient will perform ADL's & IADLs with adaptive equipment as needed with modified independence.   2.  Patient will perform toileting task with modified independence with Good safety to reduce falls risk. 3.  Patient will perform all functional transfers utilizing least restrictive device and durable medical equipment as needed with modified independence and Good balance and safety awareness. 4.  Patient will perform standing static/dynamic activity for improved ADL/IADL function with modified independence and Good balance and safety awareness. 5.  Patient will improve Barthel index score to 95/100 to improve independence with mobility. Therapist: Magda Garcia    9/17/2018       Time Calculation: 39 mins     [facility]   Occupational Therapy Daily Treatment note      Patient: Taya Mckenzie (70 y.o. male)       Date: 9/28/2018  Attending Physician: Dave Kramer MD  Primary Diagnosis: spinal stenosis    Treatment Diagnosis  Treatment Diagnosis: muscle weakness  Treatment Diagnosis 2: increased need for assist with self care    Precautions : Precautions at Admission: Back, Spinal, Fall (TLSO when OOB)  Vital Signs:  Vital Signs  Temp: 98.4 °F (36.9 °C)  Temp Source: Oral  Pulse (Heart Rate): 76  Resp Rate: 18  O2 Sat (%): 97 %  BP: 99/62  MAP (Calculated): 74  BP 1 Method: Automatic  BP 1 Location: Right arm  BP Patient Position: At rest     Cognitive Status:  Mental Status  Neurologic State: Alert  Orientation Level: Oriented X4  Cognition: Follows commands  Pain:  Pain Screen  Pain Scale 1: Numeric (0 - 10)  Pain Intensity 1: 7  Pain Location 1: Back  Pain Orientation 1: Lower  Pain Description 1: Aching  Pain Intervention(s) 1: Medication (see MAR); Emotional support;Distraction  Patient Stated Pain Goal: 0  Pain Scale 1: Numeric (0 - 10)  Gross Assessment:     Coordination:     Bed Mobility:  Bed Mobility  Supine to Sit: Modified independent  Transfers:  Functional Transfers  Sit to Stand: Modified independent     Balance:  Balance  Sitting: Without support  Sitting - Static: Good (unsupported)  Sitting - Dynamic: Good (unsupported)  Standing: With support  Standing - Static: Good  Standing - Dynamic : Fair (+)  Therapeutic Activities:  Patient reports he didn't feel well this morning however feels better and would like to participate in OT session. Vitals signs were assessed and read 105/55, supine and 94/55 at standing, HR 60. Patient reports no dizziness and would like to complete functional mobility. Functional mobility utilizing RW to/from patient's room to therapy room in order to increase functional activity tolerance and independence during task. Patient able to complete with SBA and no reports of dizziness during task. Static standing activity with two hand release in order to increase standing balance and tolerance needed for ADLS. Patient was able to complete with SBA for 8 mins prior to requesting to sit. Patient/Caregiver Education:    Joe Tadeo Education on self pacing if not feeling well was provided for optimal safety.       ASSESSMENT:  Patient continues to demonstrate the need for skilled Occupational Therapy services to improve dynamic standing balance needed for bathing  Progression toward goals:  [x]      Improving appropriately and progressing toward goals  []      Improving slowly and progressing toward goals  []      Not making progress toward goals and plan of care will be adjusted     Treatment session:  62 minutes    Therapist:    ROSANGELA Camejo,  9/28/2018

## 2018-09-28 NOTE — PROGRESS NOTES
Problem: Mobility Impaired (Adult and Pediatric) Goal: *Acute Goals and Plan of Care (Insert Text) Physical Therapy Goals Long term goals:  Initiated 9/17/2018 and to be accomplished within 3-4 weeks. (Updated 9/28/18) 1. Patient will move from supine to sit and sit to supine  in bed with modified independence. (Achieved) 2. Patient will transfer from bed to chair and chair to bed with modified independence using the least restrictive device. (Achieved with RW) 3.  Patient will perform sit to stand with modified independence. (Achieved) 4. Patient will ambulate with modified independence for 250 feet with the least restrictive device. (Progressing; 250ft with RW and (S)) 5. Patient will ascend/descend 4 stairs with right handrail, 15 stairs with left handrail(s) with supervision. (Progressing; 10 steps with L HR and side-stepping pattern with SBA, 10 steps with B HR and SBA) Short term goals: Initiated 9/17/2018, Revised 9/18/18 and to be accomplished within 1-2 weeks (updated 9/28/18) 1. Patient will move from supine to sit and sit to supine  in bed with supervision/set-up. (Achieved) 2. Patient will perform sit to stand with supervision/set-up. (Achieved) 3. Patient will transfer from bed to chair and chair to bed with supervision/set-up using the least restrictive device. (Achieved) 4. Patient will ambulate with supervision/set-up for 250 feet with the least restrictive device. (Achieved with RW) 5.  Patient will ascend/descend 4 stairs with right handrail(s), 15 stairs left handrail with close supervision/set-up. (Progressing; 10 steps with L HR and SBA, 10 steps with B HR and SBA) 6. Patient will verbalize and demonstrate 3/3 lumbar precautions. (Achieved) 7. Patient will demonstrate compliance with lumbar precautions greater than 90% of session. (Achieved) Physical Therapy Goals Short term goals: Initiated 9/17/2018, Revised 9/18/18 and to be accomplished within 1-2 weeks (updated 18) 1. Patient will move from supine to sit and sit to supine  in bed with supervision/set-up. (Progressing; SBA) 2.  Patient will perform sit to stand with supervision/set-up. (Progressing; SBA) 3. Patient will transfer from bed to chair and chair to bed with supervision/set-up using the least restrictive device. (Progressing; CGA using RW) 4.  Patient will ambulate with supervision/set-up for 250 feet with the least restrictive device. (Progressing; CGA x 170 ft using RW) 5.  Patient will ascend/descend 4 stairs with right handrail(s), 15 stairs left handrail with close supervision/set-up. (Progressing; CGA x 5 stairs using B HR) 6. Patient will verbalize and demonstrate 3/3 lumbar precautions. (Progressing; cueing required for 2/3 lumbar precautions) 7. Patient will demonstrate compliance with lumbar precautions greater than 90% of session. (Achieved) Long term goals:  Initiated 2018 and to be accomplished within 3-4 weeks. 1.  Patient will move from supine to sit and sit to supine  in bed with modified independence. 2.  Patient will transfer from bed to chair and chair to bed with modified independence using the least restrictive device. 3.  Patient will perform sit to stand with modified independence. 4.  Patient will ambulate with modified independence for 250 feet with the least restrictive device. 5.  Patient will ascend/descend 4 stairs with right handrail, 15 stairs with left handrail(s) with supervision. Physical Therapist: Jay Ansari PT on 2018 [address] [facility] PHYSICAL THERAPY WEEKLY PROGRESS REPORT Reporting Period:  Date: 18 to 18 Patient: Mohsen Peña (64 y.o. male)                         Date: 2018 Primary Diagnosis: spinal stenosis Attending Physician: Anitra Fernandez MD 
 Treatment Diagnosis Treatment Diagnosis: muscle weakness Treatment Diagnosis 2: increased need for assist with self care Precautions:  Back, Spinal, Fall (TLSO when OOB) Rehab Potential : Good: 
 
Skill interventions and education provided with clinical rationale (include individualized treatment techniques and standardized tests):  
Skilled Physical Therapy services were provided with Using a comparative statement, summarize significant progress toward goals as a result of skilled intervention provided: 
Patient has made {Good/Fair/Poor:38874::\"Good\"} progress towards their Physical Therapy goals in the areas of *** Identify remaining functional areas, impairments limiting progress and/or barriers to improvement: 
Patient would benefit from continues PT services to address the following functional deficits in *** OBJECTIVE DATA SUMMARY:  
 
INITIAL ASSESSMENT WEEKLY ASSESSMENT  
COGNITIVE STATUS COGNITIVE STATUS Neurologic State: Alert Orientation Level: Oriented X4 Cognition: Follows commands, Decreased attention/concentration Perception: Appears intact Perseveration: No perseveration noted Safety/Judgement: Fall prevention Neurologic State: Alert Orientation Level: Oriented X4 Cognition: Follows commands Perception: Appears intact Perseveration: No perseveration noted Safety/Judgement: Fall prevention PAIN PAIN Pain Scale 1: Numeric (0 - 10) Pain Intensity 1: 5 Pain Onset 1: Acute Pain Location 1: Chest 
Pain Orientation 1: Lower Pain Description 1: Heaviness, Throbbing Pain Intervention(s) 1: Medication (see MAR) Patient Stated Pain Goal: 0 Pain Reassessment 1: Yes Pain Scale 1: Numeric (0 - 10) Pain Intensity 1: 7 Pain Onset 1: acute Pain Location 1: Back Pain Orientation 1: Lower Pain Description 1: Aching Pain Intervention(s) 1: Medication (see MAR), Emotional support, Distraction Patient Stated Pain Goal: 0 Pain Reassessment 1: Yes GROSS ASSESSMENT GROSS ASSESSMENT  
AROM: Within functional limits (BUEs) Strength: Generally decreased, functional (BUEs MMT not performed 2/2 back precautions, grossly 3/5) Coordination: Within functional limits (BUEs) Tone: Normal (BUEs) Sensation: Intact (BUEs) AROM: Within functional limits (BUEs) Strength: Generally decreased, functional (BUEs MMT not performed 2/2 back precautions, grossly 3/5) Coordination: Within functional limits (BUEs) Tone: Normal (BUEs) Sensation: Intact (BUEs) BED MOBILITY BED MOBILITY Rolling: Contact guard assistance Supine to Sit: Stand-by assistance Sit to Supine: Stand-by assistance Scooting: Stand-by assistance Rolling: Supervision Supine to Sit: Modified independent Sit to Supine: Stand-by assistance Scooting: Supervision GAIT GAIT Base of Support: Center of gravity altered Speed/Elena: Slow Step Length: Left shortened, Right shortened Gait Abnormalities: Decreased step clearance Ambulation - Level of Assistance: Contact guard assistance Distance (ft): 42 Feet (ft) Assistive Device: Gait belt, Walker, rolling Rail Use: Both Stairs - Level of Assistance: Contact guard assistance Number of Stairs Trained: 5 Interventions: Verbal cues Base of Support: Center of gravity altered Speed/Elena: Slow Step Length: Right shortened, Left shortened Gait Abnormalities: Decreased step clearance Ambulation - Level of Assistance: Supervision Distance (ft): 110 Feet (ft) (x2) Assistive Device: Gait belt, Walker, rolling Rail Use: Left Stairs - Level of Assistance: Stand-by assistance Number of Stairs Trained: 10 Interventions: Safety awareness training, Verbal cues TRANSFERS TRANSFERS Sit to Stand: Contact guard assistance Stand to Sit: Contact guard assistance Bed to Chair: Contact guard assistance Sit to Stand: Modified independent Stand to Sit: Modified independent Bed to Chair: Stand-by assistance BALANCE BALANCE Sitting:  (unable 2/2 drowsiness s/p p! meds and back p! 10/10) Sitting - Static: Fair (occasional) (+) Sitting - Dynamic: Fair (occasional) Standing:  (unable 2/2 drowsiness s/p p! meds and back p! 10/10) Standing - Static: Fair Standing - Dynamic : Fair Sitting: Without support Sitting - Static: Good (unsupported) Sitting - Dynamic: Good (unsupported) Standing: With support Standing - Static: Good Standing - Dynamic : Fair (((+))) WHEELCHAIR MOBILITY/MGMT WHEELCHAIR MOBILITY/MGMT Activity Tolerance:  {Good/Fair/Poor:92806:o:\"Good\"} Activity Tolerance: {Good/Fair/Poor:15261:o:\"Good\"} Visual/Perceptual  
Corrective Lenses: Glasses Visual/Perceptual  
Vision Corrective Lenses: Glasses Auditory: Auditory Impairment: None Auditory: Auditory Auditory Impairment: None Steps: {Rail use:08127} Clinical Decision making:  *** Clinical Decision making:  *** Treatment:   *** Patient's response to treatment rendered:  *** Patient expected Discharge Location:  [x]Private Residence  [] half-way/ILF  []LTC  []Other: 
 
Plan: Continue Skilled PT services as established by the Plan of Care for 3-6 times a week. PT and Assistant have had a weekly case conference regarding the above treatment:  [x] Yes     [] No    
 
Treatment session:  55 minutes. Therapist: Julia Elkins PTA       Date:9/28/2018 Forward to PT for co-signature when completed.

## 2018-09-29 LAB
GLUCOSE BLD STRIP.AUTO-MCNC: 115 MG/DL (ref 70–110)
GLUCOSE BLD STRIP.AUTO-MCNC: 127 MG/DL (ref 70–110)
GLUCOSE BLD STRIP.AUTO-MCNC: 130 MG/DL (ref 70–110)
GLUCOSE BLD STRIP.AUTO-MCNC: 164 MG/DL (ref 70–110)
GLUCOSE BLD STRIP.AUTO-MCNC: 190 MG/DL (ref 70–110)

## 2018-09-29 PROCEDURE — 82962 GLUCOSE BLOOD TEST: CPT

## 2018-09-29 PROCEDURE — 74011250637 HC RX REV CODE- 250/637: Performed by: INTERNAL MEDICINE

## 2018-09-29 PROCEDURE — 74011636637 HC RX REV CODE- 636/637: Performed by: INTERNAL MEDICINE

## 2018-09-29 RX ADMIN — NYSTATIN: 100000 POWDER TOPICAL at 09:00

## 2018-09-29 RX ADMIN — INSULIN GLARGINE 12 UNITS: 100 INJECTION, SOLUTION SUBCUTANEOUS at 09:00

## 2018-09-29 RX ADMIN — RANOLAZINE 500 MG: 500 TABLET, FILM COATED, EXTENDED RELEASE ORAL at 08:12

## 2018-09-29 RX ADMIN — TAMSULOSIN HYDROCHLORIDE 0.4 MG: 0.4 CAPSULE ORAL at 22:08

## 2018-09-29 RX ADMIN — METOPROLOL TARTRATE 100 MG: 50 TABLET ORAL at 17:13

## 2018-09-29 RX ADMIN — HYDROCODONE BITARTRATE AND ACETAMINOPHEN 2 TABLET: 5; 325 TABLET ORAL at 08:16

## 2018-09-29 RX ADMIN — HYDROCODONE BITARTRATE AND ACETAMINOPHEN 2 TABLET: 5; 325 TABLET ORAL at 14:26

## 2018-09-29 RX ADMIN — DOCUSATE SODIUM 100 MG: 100 CAPSULE, LIQUID FILLED ORAL at 08:12

## 2018-09-29 RX ADMIN — NYSTATIN: 100000 POWDER TOPICAL at 17:15

## 2018-09-29 RX ADMIN — DILTIAZEM HYDROCHLORIDE 120 MG: 120 CAPSULE, COATED, EXTENDED RELEASE ORAL at 08:16

## 2018-09-29 RX ADMIN — DOCUSATE SODIUM 100 MG: 100 CAPSULE, LIQUID FILLED ORAL at 17:13

## 2018-09-29 RX ADMIN — METOPROLOL TARTRATE 100 MG: 50 TABLET ORAL at 08:16

## 2018-09-29 RX ADMIN — INSULIN LISPRO 2 UNITS: 100 INJECTION, SOLUTION INTRAVENOUS; SUBCUTANEOUS at 11:30

## 2018-09-29 RX ADMIN — METFORMIN HYDROCHLORIDE 500 MG: 500 TABLET ORAL at 08:12

## 2018-09-29 RX ADMIN — ATORVASTATIN CALCIUM 40 MG: 40 TABLET, FILM COATED ORAL at 22:08

## 2018-09-29 RX ADMIN — INSULIN LISPRO 2 UNITS: 100 INJECTION, SOLUTION INTRAVENOUS; SUBCUTANEOUS at 22:07

## 2018-09-29 RX ADMIN — RANOLAZINE 500 MG: 500 TABLET, FILM COATED, EXTENDED RELEASE ORAL at 17:13

## 2018-09-29 RX ADMIN — METFORMIN HYDROCHLORIDE 500 MG: 500 TABLET ORAL at 17:13

## 2018-09-29 RX ADMIN — ASPIRIN 81 MG: 81 TABLET, COATED ORAL at 08:12

## 2018-09-29 RX ADMIN — RIVAROXABAN 20 MG: 20 TABLET, FILM COATED ORAL at 17:13

## 2018-09-29 NOTE — ROUTINE PROCESS
Bedside and Verbal shift change report given to Kizzy Camejo RN (oncoming nurse) by Ha Finley LPN (offgoing nurse). Report included the following information SBAR, Kardex and MAR.

## 2018-09-29 NOTE — PROGRESS NOTES
Problem: Self Care Deficits Care Plan (Adult)  Goal: *Acute Goals and Plan of Care (Insert Text)  OCCUPATIONAL THERAPY SHORT TERM GOALS      Updated 9/21/18    1. Patient will perform Upper body ADL's without adaptive equipment with supervision/set-up. 2.  Patient will perform Lower body ADL's with adaptive equipment as needed with moderate assistance while adhering to back precautions. 3.  Patient will perform toileting task with moderate assistance with Good safety to reduce falls risk. 4.  Patient will perform functional transfers with Rolling Walker and CGA. 5.  Patient will perform standing static/dynamic balance activities for improved ADL/IADL function with CGA and Fair- balance and safety awareness. 6.  Patient will improve Barthel index scores to atleast 75/100 to improve functional mobility. Initiated 9/17/2018 and to be accomplished within 2 Week(s)    1. Patient will perform Upper body ADL's without adaptive equipment with supervision/set-up.(progressing-currently Min A)  2. Patient will perform Lower body ADL's with adaptive equipment as needed with moderate assistance while adhering to back precautions. (progressing-currently Max A)  3. Patient will perform toileting task with moderate assistance with Good safety to reduce falls risk. (progressing-currently Max A)  4. Patient will perform functional transfers with Rolling Walker and minimal assist. (goal met-[facility] CGA)  5. Patient will perform standing static/dynamic balance activities for improved ADL/IADL function with minimal assistance and Fair- balance and safety awareness. (goal met-[facility] CGA)  6. Patient will improve Barthel index scores to atleast 75/100 to improve functional mobility. (progressing)      OCCUPATIONAL THERAPY LONG TERM GOALS   Initiated 9/17/2018 and to be accomplished within 4 Week(s)    1. Patient will perform ADL's & IADLs with adaptive equipment as needed with modified independence.   2.  Patient will perform toileting task with modified independence with Good safety to reduce falls risk. 3.  Patient will perform all functional transfers utilizing least restrictive device and durable medical equipment as needed with modified independence and Good balance and safety awareness. 4.  Patient will perform standing static/dynamic activity for improved ADL/IADL function with modified independence and Good balance and safety awareness. 5.  Patient will improve Barthel index score to 95/100 to improve independence with mobility. Therapist: Rosario Syed    9/17/2018       Time Calculation: 39 mins     [facility]   Occupational Therapy Daily Treatment note      Patient: Collin Murphy (70 y.o. male)       Date: 9/29/2018  Attending Physician: Ventura Holcomb MD  Primary Diagnosis: spinal stenosis    Treatment Diagnosis  Treatment Diagnosis: muscle weakness  Treatment Diagnosis 2: increased need for assist with self care    Precautions : Precautions at Admission: Back, Spinal, Fall (TLSO when OOB)  Vital Signs:  Vital Signs  Temp: 98.3 °F (36.8 °C)  Temp Source: Oral  Pulse (Heart Rate): 60  Heart Rate Source: Monitor  Resp Rate: 20  O2 Sat (%): 98 %  Level of Consciousness: Alert  BP: 150/67  MAP (Calculated): 95  BP 1 Method: Automatic  BP 1 Location: Right arm  BP Patient Position: At rest  MEWS Score: 1     Cognitive Status:  Mental Status  Neurologic State: Alert; Appropriate for age  Orientation Level: Oriented X4  Cognition: Follows commands  Pain:  Pain Screen  Pain Scale 1: Visual  Pain Intensity 1: 0  Patient Stated Pain Goal: 0  Pain Reassessment 1: Patient sleeping  Pain Scale 1: Visual  Bed Mobility:  Bed Mobility  Sit to Supine: Modified independent  Balance:  Balance  Sitting: With support  Sitting - Static: Good (unsupported)  Therapeutic Activities:  Pt presented sitting EOB upon therapist arrival reporting feeling dizzy. NSG staff present, BP taken 150/67 and .  Pt returned to supine in bed MOD I and able to reposition self to comfort while adhering to spinal precautions. Pt agreeable to bed level activity performing oral care with S/U. Pt reported dizziness subsided however requested to remain in bed at this time. Call bell left within reach. Therapeutic Exercises:   BUE ex with 5# digi flex 2 sets x 30 to increase strength for ADL carryover. Patient/Caregiver Education:    Pt educated on fall prevention and safety to reduce fall risk. ASSESSMENT:  Patient continues to demonstrate the need for skilled Occupational Therapy services to improve strength, balance, and endurance.  Progression toward goals:  [x]      Improving appropriately and progressing toward goals  []      Improving slowly and progressing toward goals  []      Not making progress toward goals and plan of care will be adjusted     Treatment session:   35 minutes    Therapist:    ROSANGELA Brown,  9/29/2018

## 2018-09-29 NOTE — ROUTINE PROCESS
Bedside and Verbal shift change report given to VICTOR MANUEL Resendiz LPN (oncoming nurse) by Huey Mcclure RN (offgoing nurse). Report included the following information SBAR, Kardex and MAR. Qh visual checks and 24h chart checks done

## 2018-09-29 NOTE — ROUTINE PROCESS
Bedside and Verbal shift change report given to Sushma Correa RN (oncoming nurse) by [facility], LPN (offgoing nurse). Report included the following information SBAR, Kardex and MAR.

## 2018-09-30 LAB
GLUCOSE BLD STRIP.AUTO-MCNC: 122 MG/DL (ref 70–110)
GLUCOSE BLD STRIP.AUTO-MCNC: 134 MG/DL (ref 70–110)
GLUCOSE BLD STRIP.AUTO-MCNC: 184 MG/DL (ref 70–110)
GLUCOSE BLD STRIP.AUTO-MCNC: 91 MG/DL (ref 70–110)

## 2018-09-30 PROCEDURE — 74011250637 HC RX REV CODE- 250/637: Performed by: INTERNAL MEDICINE

## 2018-09-30 PROCEDURE — 82962 GLUCOSE BLOOD TEST: CPT

## 2018-09-30 PROCEDURE — 74011636637 HC RX REV CODE- 636/637: Performed by: INTERNAL MEDICINE

## 2018-09-30 RX ADMIN — INSULIN LISPRO 2 UNITS: 100 INJECTION, SOLUTION INTRAVENOUS; SUBCUTANEOUS at 17:23

## 2018-09-30 RX ADMIN — RIVAROXABAN 20 MG: 20 TABLET, FILM COATED ORAL at 17:25

## 2018-09-30 RX ADMIN — HYDROCODONE BITARTRATE AND ACETAMINOPHEN 1 TABLET: 5; 325 TABLET ORAL at 12:31

## 2018-09-30 RX ADMIN — TAMSULOSIN HYDROCHLORIDE 0.4 MG: 0.4 CAPSULE ORAL at 21:29

## 2018-09-30 RX ADMIN — INSULIN GLARGINE 12 UNITS: 100 INJECTION, SOLUTION SUBCUTANEOUS at 09:37

## 2018-09-30 RX ADMIN — RANOLAZINE 500 MG: 500 TABLET, FILM COATED, EXTENDED RELEASE ORAL at 17:25

## 2018-09-30 RX ADMIN — METFORMIN HYDROCHLORIDE 500 MG: 500 TABLET ORAL at 17:25

## 2018-09-30 RX ADMIN — HYDROCODONE BITARTRATE AND ACETAMINOPHEN 2 TABLET: 5; 325 TABLET ORAL at 17:25

## 2018-09-30 RX ADMIN — RANOLAZINE 500 MG: 500 TABLET, FILM COATED, EXTENDED RELEASE ORAL at 09:30

## 2018-09-30 RX ADMIN — METOPROLOL TARTRATE 100 MG: 50 TABLET ORAL at 20:06

## 2018-09-30 RX ADMIN — NYSTATIN: 100000 POWDER TOPICAL at 18:00

## 2018-09-30 RX ADMIN — METFORMIN HYDROCHLORIDE 500 MG: 500 TABLET ORAL at 09:30

## 2018-09-30 RX ADMIN — ATORVASTATIN CALCIUM 40 MG: 40 TABLET, FILM COATED ORAL at 21:29

## 2018-09-30 RX ADMIN — DOCUSATE SODIUM 100 MG: 100 CAPSULE, LIQUID FILLED ORAL at 17:25

## 2018-09-30 RX ADMIN — ASPIRIN 81 MG: 81 TABLET, COATED ORAL at 09:28

## 2018-09-30 RX ADMIN — DOCUSATE SODIUM 100 MG: 100 CAPSULE, LIQUID FILLED ORAL at 09:27

## 2018-09-30 RX ADMIN — METOPROLOL TARTRATE 50 MG: 50 TABLET ORAL at 09:28

## 2018-09-30 RX ADMIN — DILTIAZEM HYDROCHLORIDE 120 MG: 120 CAPSULE, COATED, EXTENDED RELEASE ORAL at 09:29

## 2018-09-30 NOTE — PROGRESS NOTES
Problem: Self Care Deficits Care Plan (Adult)  Goal: *Acute Goals and Plan of Care (Insert Text)  OCCUPATIONAL THERAPY SHORT TERM GOALS      Updated 9/21/18    1. Patient will perform Upper body ADL's without adaptive equipment with supervision/set-up. 2.  Patient will perform Lower body ADL's with adaptive equipment as needed with moderate assistance while adhering to back precautions. 3.  Patient will perform toileting task with moderate assistance with Good safety to reduce falls risk. 4.  Patient will perform functional transfers with Rolling Walker and CGA. 5.  Patient will perform standing static/dynamic balance activities for improved ADL/IADL function with CGA and Fair- balance and safety awareness. 6.  Patient will improve Barthel index scores to atleast 75/100 to improve functional mobility. Initiated 9/17/2018 and to be accomplished within 2 Week(s)    1. Patient will perform Upper body ADL's without adaptive equipment with supervision/set-up.(progressing-currently Min A)  2. Patient will perform Lower body ADL's with adaptive equipment as needed with moderate assistance while adhering to back precautions. (progressing-currently Max A)  3. Patient will perform toileting task with moderate assistance with Good safety to reduce falls risk. (progressing-currently Max A)  4. Patient will perform functional transfers with Rolling Walker and minimal assist. (goal met-[facility] CGA)  5. Patient will perform standing static/dynamic balance activities for improved ADL/IADL function with minimal assistance and Fair- balance and safety awareness. (goal met-[facility] CGA)  6. Patient will improve Barthel index scores to atleast 75/100 to improve functional mobility. (progressing)      OCCUPATIONAL THERAPY LONG TERM GOALS   Initiated 9/17/2018 and to be accomplished within 4 Week(s)    1. Patient will perform ADL's & IADLs with adaptive equipment as needed with modified independence.   2.  Patient will perform toileting task with modified independence with Good safety to reduce falls risk. 3.  Patient will perform all functional transfers utilizing least restrictive device and durable medical equipment as needed with modified independence and Good balance and safety awareness. 4.  Patient will perform standing static/dynamic activity for improved ADL/IADL function with modified independence and Good balance and safety awareness. 5.  Patient will improve Barthel index score to 95/100 to improve independence with mobility. Therapist: Charles Tinsley    9/17/2018       Time Calculation: 39 mins     [facility]   Occupational Therapy Daily Treatment note      Patient:  Jose Rafael Colon (70 y.o. male)       Date: 9/30/2018  Attending Physician: Brandy Cook MD  Primary Diagnosis: spinal stenosis    Treatment Diagnosis  Treatment Diagnosis: muscle weakness  Treatment Diagnosis 2: increased need for assist with self care    Precautions : Precautions at Admission: Back, Spinal, Fall (TLSO when OOB)  Vital Signs:       Cognitive Status:     Pain:        Gross Assessment:     Coordination:     Bed Mobility:     Transfers:  Functional Transfers  Sit to Stand: Supervision  Toilet Transfer : Modified independent (w/ RW)  Functional Transfers  Toilet Transfer : Modified independent (w/ RW)  Balance:  Balance  Sitting: Without support  Sitting - Static: Good (unsupported)  Sitting - Dynamic: Good (unsupported)  Standing: With support  Standing - Static: Good  Standing - Dynamic : Fair (+)  ADL Self Care:     ADL Intervention:           Toileting  Toileting Assistance: Modified independent  Bladder Hygiene: Modified independent  Clothing Management: Modified independent  Adaptive Equipment: Elevated seat;Walker;Grab bars     Lower Body Dressing Assistance  Socks: Modified independent  Leg Crossed Method Used: Yes (flexing at hip to bring foot close to torso while seated eom)             Therapeutic Activities:  Functional mobility w/ RW from patient's room -> rehab gym w/ Supv. Therapeutic activity:  BUE reaching w/ 1# wrist weights in various planes to increase UE strength, gross motor skills, sitting balance and functional activity tolerance to improve ADL performance skills with good participation of placement & removal of cards onto vertical card board while seated on edge of mat and good adherence to back/spinal precautions. Vitals monitored: supine HR 64, O2 @ RA 98%, /61, seated edge of bed HR 70, O2 @ RA 99%, /58, following functional mobility w/ RW from patient's room to rehab gym HR 66, O2 @ %, /64, followed prolonged seated rest break seated edge of mat HR 63, O2 @ RA 99%, /73. Functional mobility w/ RW from rehab gym -> patient's room w/ Supv  Patient/Caregiver Education:    Patient education for safety w/ RW during bathroom mobility i.e. keeping RW in front of self for balance and prevention of falls.     ASSESSMENT:  Patient continues to demonstrate the need for skilled Occupational Therapy services to improve grooming, bathing, upper body dressing, lower body dressing, toileting and toilet transfer  Progression toward goals:  [x]      Improving appropriately and progressing toward goals  []      Improving slowly and progressing toward goals  []      Not making progress toward goals and plan of care will be adjusted     Treatment session:   56 minutes    Therapist:    Eliseo Carrington,  9/30/2018

## 2018-09-30 NOTE — ROUTINE PROCESS
Bedside and Verbal shift change report given to Telma Leyva RN (oncoming nurse) by Sherrilee Goldberg, RN (offgoing nurse). Report included the following information SBAR, Kardex and MAR.

## 2018-09-30 NOTE — ROUTINE PROCESS
Bedside and Verbal shift change report given to Anne-Marie Ann (oncoming nurse) by Kiko Simmons RN (offgoing nurse). Report included the following information SBAR, Kardex and MAR. QH visual checks and 24H chart checks done.

## 2018-09-30 NOTE — ROUTINE PROCESS
Bedside and Verbal shift change report given to [facility], RN (oncoming nurse) by MEHREEN Mcconnell (offgoing nurse). Report included the following information SBAR, Kardex and MAR.

## 2018-10-01 LAB
GLUCOSE BLD STRIP.AUTO-MCNC: 106 MG/DL (ref 70–110)
GLUCOSE BLD STRIP.AUTO-MCNC: 109 MG/DL (ref 70–110)
GLUCOSE BLD STRIP.AUTO-MCNC: 110 MG/DL (ref 70–110)
GLUCOSE BLD STRIP.AUTO-MCNC: 130 MG/DL (ref 70–110)

## 2018-10-01 PROCEDURE — 74011636637 HC RX REV CODE- 636/637: Performed by: INTERNAL MEDICINE

## 2018-10-01 PROCEDURE — 74011250637 HC RX REV CODE- 250/637: Performed by: INTERNAL MEDICINE

## 2018-10-01 PROCEDURE — 82962 GLUCOSE BLOOD TEST: CPT

## 2018-10-01 RX ADMIN — RANOLAZINE 500 MG: 500 TABLET, FILM COATED, EXTENDED RELEASE ORAL at 08:36

## 2018-10-01 RX ADMIN — METFORMIN HYDROCHLORIDE 500 MG: 500 TABLET ORAL at 17:16

## 2018-10-01 RX ADMIN — DILTIAZEM HYDROCHLORIDE 120 MG: 120 CAPSULE, COATED, EXTENDED RELEASE ORAL at 08:36

## 2018-10-01 RX ADMIN — RANOLAZINE 500 MG: 500 TABLET, FILM COATED, EXTENDED RELEASE ORAL at 17:16

## 2018-10-01 RX ADMIN — METFORMIN HYDROCHLORIDE 500 MG: 500 TABLET ORAL at 08:36

## 2018-10-01 RX ADMIN — RIVAROXABAN 20 MG: 20 TABLET, FILM COATED ORAL at 17:16

## 2018-10-01 RX ADMIN — ASPIRIN 81 MG: 81 TABLET, COATED ORAL at 08:36

## 2018-10-01 RX ADMIN — INSULIN GLARGINE 12 UNITS: 100 INJECTION, SOLUTION SUBCUTANEOUS at 08:35

## 2018-10-01 RX ADMIN — HYDROCODONE BITARTRATE AND ACETAMINOPHEN 2 TABLET: 5; 325 TABLET ORAL at 21:58

## 2018-10-01 RX ADMIN — METOPROLOL TARTRATE 100 MG: 50 TABLET ORAL at 08:36

## 2018-10-01 RX ADMIN — DOCUSATE SODIUM 100 MG: 100 CAPSULE, LIQUID FILLED ORAL at 08:36

## 2018-10-01 RX ADMIN — TAMSULOSIN HYDROCHLORIDE 0.4 MG: 0.4 CAPSULE ORAL at 21:58

## 2018-10-01 RX ADMIN — HYDROCODONE BITARTRATE AND ACETAMINOPHEN 2 TABLET: 5; 325 TABLET ORAL at 08:36

## 2018-10-01 RX ADMIN — NYSTATIN: 100000 POWDER TOPICAL at 17:18

## 2018-10-01 RX ADMIN — NYSTATIN: 100000 POWDER TOPICAL at 08:39

## 2018-10-01 RX ADMIN — DOCUSATE SODIUM 100 MG: 100 CAPSULE, LIQUID FILLED ORAL at 17:16

## 2018-10-01 RX ADMIN — METOPROLOL TARTRATE 100 MG: 50 TABLET ORAL at 17:16

## 2018-10-01 RX ADMIN — ATORVASTATIN CALCIUM 40 MG: 40 TABLET, FILM COATED ORAL at 21:58

## 2018-10-01 NOTE — PROGRESS NOTES
I have reviewed this patient's current medication list and recent laboratory results. At this time, I do not suggest any drug therapy adjustments or additional laboratory monitoring. Thank you,  Nu REDDY  Ph. M. S.  10/1/2018

## 2018-10-01 NOTE — PROGRESS NOTES
Problem: Self Care Deficits Care Plan (Adult)  Goal: *Acute Goals and Plan of Care (Insert Text)  OCCUPATIONAL THERAPY SHORT TERM GOALS      LATE ENTRY for 9/28/18    1. Patient will perform Upper body ADL's without adaptive equipment with Mod I.  2.  Patient will perform Lower body ADL's with adaptive equipment as needed with Mod I while adhering to back precautions. 3.  Patient will perform toileting task with Mod I with Good safety to reduce falls risk. 4.  Patient will perform functional transfers with Rolling Walker and Mod I.  5.  Patient will perform standing static/dynamic balance activities for improved ADL/IADL function with Mod I and Good balance and safety awareness. 6.  Patient will improve Barthel index scores to atleast 85/100 to improve functional mobility. Updated 9/21/18    1. Patient will perform Upper body ADL's without adaptive equipment with supervision/set-up. (goal met-UPG Mod I)  2. Patient will perform Lower body ADL's with adaptive equipment as needed with moderate assistance while adhering to back precautions. (goal met-UPG Mod I)  3. Patient will perform toileting task with moderate assistance with Good safety to reduce falls risk. (goal met-UPG Mod I)  4. Patient will perform functional transfers with Rolling Walker and CGA. (goal met-UPG Mod I)  5. Patient will perform standing static/dynamic balance activities for improved ADL/IADL function with CGA and Fair- balance and safety awareness. (goal met-UPG Mod I)  6. Patient will improve Barthel index scores to atleast 75/100 to improve functional mobility. (goal met-UPG 85/100)    Initiated 9/17/2018 and to be accomplished within 2 Week(s)    1. Patient will perform Upper body ADL's without adaptive equipment with supervision/set-up.(progressing-currently Min A)  2.   Patient will perform Lower body ADL's with adaptive equipment as needed with moderate assistance while adhering to back precautions. (progressing-currently Max A)  3. Patient will perform toileting task with moderate assistance with Good safety to reduce falls risk. (progressing-currently Max A)  4. Patient will perform functional transfers with Rolling Walker and minimal assist. (goal met-UPG CGA)  5. Patient will perform standing static/dynamic balance activities for improved ADL/IADL function with minimal assistance and Fair- balance and safety awareness. (goal met-UPG CGA)  6. Patient will improve Barthel index scores to atleast 75/100 to improve functional mobility. (progressing)      OCCUPATIONAL THERAPY LONG TERM GOALS   Initiated 9/17/2018 and to be accomplished within 4 Week(s)    1. Patient will perform ADL's & IADLs with adaptive equipment as needed with modified independence. 2.  Patient will perform toileting task with modified independence with Good safety to reduce falls risk. 3.  Patient will perform all functional transfers utilizing least restrictive device and durable medical equipment as needed with modified independence and Good balance and safety awareness. 4.  Patient will perform standing static/dynamic activity for improved ADL/IADL function with modified independence and Good balance and safety awareness. 5.  Patient will improve Barthel index score to 95/100 to improve independence with mobility. Therapist: Bhargavi Garner    9/17/2018       Time Calculation: 45 mins     LATE ENTRY FOR 9/28/18    [facility]  OCCUPATIONAL THERAPY WEEKLY SUMMARY   Reporting period:  from 9/21/18 through 9/28/18       Patient:  Tod Carmona (70 y.o. male)   Date: 10/1/2018    Primary Diagnosis: spinal stenosis       Attending Physician: Flavia Thomas MD Treatment Diagnosis  Treatment Diagnosis: muscle weakness  Treatment Diagnosis 2: increased need for assist with self care   Precautions: Back, Spinal, Fall (TLSO when OOB)  Rehab Potential : Good    Skill interventions and education provided with clinical rationale (include individualized treatment techniques and standardized tests):  Skilled Occupational services were provided utilizing ADL retraining, safety awareness and energy conservation techniques, standing activities incorporating balance retraining, therapeutic exercise and activities incorporating strengthening in order to improve ADL performance skills and functional mobility. Using a comparative statement, summarize significant progress toward goals as a result of skilled intervention provided:  Patient has made Good progress towards their Occupational Therapy goals in the following areas: increased independence, performance, static standing balance and safety needed for grooming, bathing, upper body dressing, lower body dressing, toileting and toilet transfer using Reacher. Patient has met 6/6 STGS and making slow but steady progression towards LTGS. Identify remaining functional areas, impairments limiting progress and/or barriers to improvement:  Patient would benefit from continued skilled Occupational Therapy Services to address the following functional deficits in decreased dynamic standing balance and tolerance needed to safely complete ADLS.          OBJECTIVE DATA SUMMARY:       INITIAL ASSESSMENT WEEKLY PROGRESS   COGNITIVE STATUS: COGNITIVE STATUS:   Neurologic State: Alert  Orientation Level: Oriented X4  Cognition: Follows commands, Decreased attention/concentration  Perception: Appears intact  Perseveration: No perseveration noted  Safety/Judgement: Fall prevention Neurologic State: Alert  Orientation Level: Oriented X4  Cognition: Follows commands  Perception: Appears intact  Perseveration: No perseveration noted  Safety/Judgement: Fall prevention   PAIN: PAIN:   Pain Scale 1: Numeric (0 - 10)  Pain Intensity 1: 5  Pain Onset 1: Acute  Pain Location 1: Chest  Pain Orientation 1: Lower  Pain Description 1: Heaviness, Throbbing  Pain Intervention(s) 1: Medication (see MAR)  Patient Stated Pain Goal: 0  Pain Reassessment 1: Yes     Pain Scale 1: Numeric (0 - 10)  Pain Intensity 1: 6  Pain Onset 1: acute  Pain Location 1: Back, Incisional  Pain Orientation 1: Lower  Pain Description 1: Aching  Pain Intervention(s) 1: Medication (see MAR)  Patient Stated Pain Goal: 0  Pain Reassessment 1: Yes   BED MOBILITY BED MOBILITY   Rolling: Contact guard assistance  Supine to Sit: Stand-by assistance  Sit to Supine: Stand-by assistance  Scooting: Stand-by assistance Rolling: Supervision  Supine to Sit: Modified independent  Sit to Supine: Modified independent  Scooting: Supervision   ADL SELF CARE ADL SELF CARE   Oral Facial Hygiene/Grooming: Contact guard assistance (@ stand with RW)  Type of Bath: Basin/Soap/Water  Lower Body Dressing: Contact guard assistance (with AE)  Toileting: Contact guard assistance Bathing Assistance: Stand-by assistance (& dep w/ backj)  Position Performed:  (seated w/c level at sinkside)    Dressing Assistance: Contact guard assistance  Orthotics(Brace): Minimum assistance  Shirt simulation with hospital gown: Minimum  assistance    Bathing Assistance:  Moderate assistance  Perineal  : Moderate assistance  Position Performed:  (seated & in stance w/ RW)    Toileting Assistance: Modified independent  Bladder Hygiene: Modified independent  Clothing Management: Modified independent  Adaptive Equipment: Elevated seat, Walker, Grab bars    Grooming Assistance: Contact guard assistance  Washing Face: Stand-by assistance (seated w/c level at sinkside)  Washing Hands: Contact guard assistance ((able to tolerate for 1 1/2 prior to requesting to sit))  Brushing Teeth: Stand-by assistance (seated w/c level at sinkside using spittoon )    Dressing Assistance: Modified independent  Protective Undergarmet: Maximum assistance  Socks: Modified independent  Leg Crossed Method Used: Yes (flexing at hip to bring foot close to torso while seated eom)  Position Performed: Seated edge of bed  Adaptive Equipment Used: Reacher Feeding Assistance: Modified independent   TRANSFERS TRANSFERS   Sit to Stand: Contact guard assistance  Stand to Sit: Contact guard assistance  Bed to Chair: Contact guard assistance  Bathroom Mobility: Contact guard assistance  Toilet Transfer :  (unable 2/2 drowsiness s/p p! meds and back p! 10/10) Sit to Stand: Supervision  Stand to Sit: Supervision  Bed to Chair: Modified independent  Bathroom Mobility: Modified independent  Toilet Transfer : Modified independent (w/ RW)   Bathroom Mobility: Contact guard assistance  Toilet Transfer :  (unable 2/2 drowsiness s/p p! meds and back p! 10/10)  Adaptive Equipment: Walker (comment), Grab bars (raised toilet seat) Bathroom Mobility: Modified independent  Toilet Transfer : Modified independent (w/ RW)  Adaptive Equipment: Walker (comment), Grab bars (raised toilet seat)   BALANCE BALANCE   Sitting:  (unable 2/2 drowsiness s/p p! meds and back p! 10/10)  Sitting - Static: Fair (occasional) (+)  Sitting - Dynamic: Fair (occasional)  Standing:  (unable 2/2 drowsiness s/p p! meds and back p! 10/10)  Standing - Static: Fair  Standing - Dynamic : Fair Sitting: With support  Sitting - Static: Good (unsupported)  Sitting - Dynamic: Good (unsupported)  Standing: With support  Standing - Static: Good  Standing - Dynamic : Fair (+)       GROSS ASSESSMENT  GROSS ASSESSMENT   AROM: Within functional limits (BUEs)  Strength: Generally decreased, functional (BUEs MMT not performed 2/2 back precautions, grossly 3/5)  Coordination: Within functional limits (BUEs)  Tone: Normal (BUEs)  Sensation: Intact (BUEs) AROM: Within functional limits (BUEs)  Strength: Generally decreased, functional (BUEs MMT not performed 2/2 back precautions, grossly 3/5)  Coordination: Within functional limits (BUEs)  Tone: Normal (BUEs)  Sensation: Intact (BUEs)   COORDINATION COORDINATION   Fine Motor Skills-Upper: Left Intact, Right Intact  Gross Motor Skills-Upper: Left Intact, Right Intact Fine Motor Skills-Upper: Left Intact, Right Intact  Gross Motor Skills-Upper: Left Intact, Right Intact   VISUAL/PERCEPTUAL VISUAL/PERCEPTUAL   Corrective Lenses: Glasses   Corrective Lenses: Glasses     AUDITORY: AUDITORY:   Auditory Impairment: None Auditory Impairment: None       INSTRUMENTAL  ADL'S:    INSTRUMENTAL ADL'S:          THE BARTHEL INDEX  ACTIVITY   SCORE   FEEDING  0=unable  5=needs help cutting,spreading butter,etc., or modified diet  10= independent   10   BATHING  0=dependent  5=independent (or in shower   5   GROOMING  0=needs help  5=independent face/hair/teeth/shaving (implements provided)   5   DRESSING  0=dependent  5=needs help but can do about half unaided  10=independent(including buttons, zips,laces etc.)   10   BOWELS  0=incontinent  5=occasional accident  10=continent   10   BLADDER  0=incontinent, or catheterized and unable to manage alone  5=occasional accident  10=continent   10   TOILET USE  0=dependent  5=needs some help, but can do something alone  10=independent (on and off, dressing, wiping)   10   TRANSFER (BED TO CHAIR AND BACK)  0=unable, no sitting balance  5=major help(one or two people,physical), can sit  10=minor help(verbal or physical)  15=independent   10   MOBILITY (ON LEVEL SURFACES)  0=immobile or <50 yards  5=wheelchair independent,including corners,>50 yards  10=walkes with help of one person (verbal or physical) >50 yards  15=independent(but may use any aid; for example, stick) >50 yards   10   STAIRS  0=unable  5=needs help (verbal, physical, carrying aid)  10=independent   5            TOTAL:                 85/100     Treatment:      Patient's response to Treatment rendered:      Patient expected Discharge Location:  [x]Private Residence  [] senior living/ILF  []LTC  []Other:    Plan: Continue OT services as established on the Plan of Care for 3 times a week.  Planning of discharge on 10/4/18    Treatment Minutes:    OT and Assistant have had a weekly case conference regarding the above treatment:  [x] Yes     [] No    Therapist:   Khushbu Ortiz,         Date:10/1/2018     Forward to OT for co-signature when completed

## 2018-10-01 NOTE — PROGRESS NOTES
Problem: Mobility Impaired (Adult and Pediatric)  Goal: *Acute Goals and Plan of Care (Insert Text)  Physical Therapy Goals    Long term goals:  Initiated 9/17/2018 and to be accomplished within 3-4 weeks. (Updated 9/28/18)     1. Patient will move from supine to sit and sit to supine  in bed with modified independence. (Achieved)  2. Patient will transfer from bed to chair and chair to bed with modified independence using the least restrictive device. (Achieved with RW)  3.  Patient will perform sit to stand with modified independence. (Achieved)  4. Patient will ambulate with modified independence for 250 feet with the least restrictive device. (Progressing; 250ft with RW and (S))  5. Patient will ascend/descend 4 stairs with right handrail, 15 stairs with left handrail(s) with supervision. (Progressing; 10 steps with L HR and side-stepping pattern with SBA, 10 steps with B HR and SBA)    Short term goals: Initiated 9/17/2018, Revised 9/18/18 and to be accomplished within 1-2 weeks (updated 9/28/18)    1. Patient will move from supine to sit and sit to supine  in bed with supervision/set-up. (Achieved)     2. Patient will perform sit to stand with supervision/set-up. (Achieved)  3. Patient will transfer from bed to chair and chair to bed with supervision/set-up using the least restrictive device. (Achieved)  4. Patient will ambulate with supervision/set-up for 250 feet with the least restrictive device. (Achieved with RW)  5.  Patient will ascend/descend 4 stairs with right handrail(s), 15 stairs left handrail with close supervision/set-up. (Progressing; 10 steps with L HR and SBA, 10 steps with B HR and SBA)  6. Patient will verbalize and demonstrate 3/3 lumbar precautions. (Achieved)   7. Patient will demonstrate compliance with lumbar precautions greater than 90% of session.    (Achieved)    Physical Therapy Goals  Short term goals: Initiated 9/17/2018, Revised 9/18/18 and to be accomplished within 1-2 weeks (updated 9/21/18)    1. Patient will move from supine to sit and sit to supine  in bed with supervision/set-up. (Progressing; SBA)     2.  Patient will perform sit to stand with supervision/set-up. (Progressing; SBA)  3. Patient will transfer from bed to chair and chair to bed with supervision/set-up using the least restrictive device. (Progressing; CGA using RW)  4.  Patient will ambulate with supervision/set-up for 250 feet with the least restrictive device. (Progressing; CGA x 170 ft using RW)  5.  Patient will ascend/descend 4 stairs with right handrail(s), 15 stairs left handrail with close supervision/set-up. (Progressing; CGA x 5 stairs using B HR)  6. Patient will verbalize and demonstrate 3/3 lumbar precautions. (Progressing; cueing required for 2/3 lumbar precautions)   7. Patient will demonstrate compliance with lumbar precautions greater than 90% of session. (Achieved)      Long term goals:  Initiated 9/17/2018 and to be accomplished within 3-4 weeks. 1.  Patient will move from supine to sit and sit to supine  in bed with modified independence. 2.  Patient will transfer from bed to chair and chair to bed with modified independence using the least restrictive device. 3.  Patient will perform sit to stand with modified independence. 4.  Patient will ambulate with modified independence for 250 feet with the least restrictive device. 5.  Patient will ascend/descend 4 stairs with right handrail, 15 stairs with left handrail(s) with supervision. Physical Therapist: Christine Arnold PT on 9/17/2018            Outcome: [id number]   physical Therapy Daily Treatment note      Patient:  Alisha Frye (38 y.o. male)               Date: 10/1/2018    Physician: Jass Holland MD  Primary Diagnosis: spinal stenosis          Treatment Diagnosis  Treatment Diagnosis: muscle weakness  Treatment Diagnosis 2: increased need for assist with self care   Precautions: Back, Spinal, Fall (TLSO when OOB)  Vital Signs  Vital Signs  Pulse (Heart Rate): 67  BP: 135/71  Cognitive Status:  Mental Status  Neurologic State: Alert  Orientation Level: Oriented X4  Cognition: Follows commands  Pain  Bed Mobility Training  Bed Mobility Training  Sit to Supine: Modified independent  Balance  Sitting: With support  Sitting - Static: Good (unsupported)  Sitting - Dynamic: Good (unsupported)  Standing: With support  Standing - Static: Good  Standing - Dynamic : Fair (+)  Transfer Training  Transfer Training  Sit to Stand: Supervision  Stand to Sit: Supervision  Sit to Stand: Supervision  Gait Training  Gait  Base of Support: Center of gravity altered  Speed/Miguel: Slow  Step Length: Left shortened;Right shortened  Gait Abnormalities: Antalgic;Decreased step clearance  Ambulation - Level of Assistance: Supervision  Distance (ft): 105 Feet (ft)+145ft  Assistive Device: Brace/Splint;Gait belt;Walker, rolling  Gait Abnormalities: Antalgic;Decreased step clearance  Therapeutic Exercise:   Pt continues to progress well towards goals with good compliance of maintaining spinal precautions during transfers/gait activities. Pt amb 105ft+145ft (S) using FWW without w/c follow, demonstrating slow miguel, decreased step length/step clearance, and mild antalgic gait with1-2 short standing rest break during gait. Pt completed seated TE AROM B LE's: ankle pumps, LAQ's, hip marches, hip add using ball, and orange TB (hip abd, hamstring curls) 10x4 reps with rest in between sets to improve overall LE ROM/endurance. Patient/Caregiver Education:   Pt /Caregiver Education on safety techniques during functional transfers/gait and importance of maintaining spinal precautions of no \"BLT\" to promote healing, increase functional mobility, and maximize overall safety upon return to home. Pt verbalized and demonstrated understanding.      ASSESSMENT:  Patient continues to benefit from Skilled PT services to improve strength, endurance, balance, and functional mobility up d/c.      Progression toward goals:  []      Improving appropriately and progressing toward goals  []      Improving slowly and progressing toward goals  []      Not making progress toward goals and plan of care will be adjusted     Treatment session: 62 minutes.   Therapist:   Mickie Lua PTA,          10/1/2018

## 2018-10-01 NOTE — PROGRESS NOTES
SW met with pt to review the Medicare notice and the appeal process. Pt signed the notice and was given a copy. Discharge planned for 10/4/18.

## 2018-10-01 NOTE — ROUTINE PROCESS
Bedside shift change report given to Lona Caldwell by Bhaskar Castle. Report included the following information SBAR, Kardex and MAR.

## 2018-10-01 NOTE — PROGRESS NOTES
WOODY spoke with MD re: request for pt's d/c on 10/4/18. WOODY also provided written request to MD as well.

## 2018-10-02 LAB
ANION GAP SERPL CALC-SCNC: 13 MMOL/L (ref 3–18)
BASOPHILS # BLD: 0 K/UL (ref 0–0.1)
BASOPHILS NFR BLD: 0 % (ref 0–2)
BUN SERPL-MCNC: 19 MG/DL (ref 7–18)
BUN/CREAT SERPL: 18 (ref 12–20)
CALCIUM SERPL-MCNC: 8.5 MG/DL (ref 8.5–10.1)
CHLORIDE SERPL-SCNC: 98 MMOL/L (ref 100–108)
CO2 SERPL-SCNC: 25 MMOL/L (ref 21–32)
CREAT SERPL-MCNC: 1.03 MG/DL (ref 0.6–1.3)
DIFFERENTIAL METHOD BLD: ABNORMAL
EOSINOPHIL # BLD: 0.1 K/UL (ref 0–0.4)
EOSINOPHIL NFR BLD: 2 % (ref 0–5)
ERYTHROCYTE [DISTWIDTH] IN BLOOD BY AUTOMATED COUNT: 18.3 % (ref 11.6–14.5)
GLUCOSE BLD STRIP.AUTO-MCNC: 101 MG/DL (ref 70–110)
GLUCOSE BLD STRIP.AUTO-MCNC: 120 MG/DL (ref 70–110)
GLUCOSE BLD STRIP.AUTO-MCNC: 146 MG/DL (ref 70–110)
GLUCOSE BLD STRIP.AUTO-MCNC: 154 MG/DL (ref 70–110)
GLUCOSE BLD STRIP.AUTO-MCNC: 158 MG/DL (ref 70–110)
GLUCOSE SERPL-MCNC: 146 MG/DL (ref 74–99)
HCT VFR BLD AUTO: 31.9 % (ref 36–48)
HGB BLD-MCNC: 9.9 G/DL (ref 13–16)
LYMPHOCYTES # BLD: 0.7 K/UL (ref 0.9–3.6)
LYMPHOCYTES NFR BLD: 12 % (ref 21–52)
MCH RBC QN AUTO: 21.9 PG (ref 24–34)
MCHC RBC AUTO-ENTMCNC: 31 G/DL (ref 31–37)
MCV RBC AUTO: 70.6 FL (ref 74–97)
MONOCYTES # BLD: 0.4 K/UL (ref 0.05–1.2)
MONOCYTES NFR BLD: 7 % (ref 3–10)
NEUTS SEG # BLD: 4.6 K/UL (ref 1.8–8)
NEUTS SEG NFR BLD: 79 % (ref 40–73)
PLATELET # BLD AUTO: 181 K/UL (ref 135–420)
PMV BLD AUTO: 9.6 FL (ref 9.2–11.8)
POTASSIUM SERPL-SCNC: 4.4 MMOL/L (ref 3.5–5.5)
RBC # BLD AUTO: 4.52 M/UL (ref 4.7–5.5)
SODIUM SERPL-SCNC: 136 MMOL/L (ref 136–145)
WBC # BLD AUTO: 5.8 K/UL (ref 4.6–13.2)

## 2018-10-02 PROCEDURE — 82962 GLUCOSE BLOOD TEST: CPT

## 2018-10-02 PROCEDURE — 74011250636 HC RX REV CODE- 250/636: Performed by: INTERNAL MEDICINE

## 2018-10-02 PROCEDURE — 85025 COMPLETE CBC W/AUTO DIFF WBC: CPT | Performed by: INTERNAL MEDICINE

## 2018-10-02 PROCEDURE — 74011636637 HC RX REV CODE- 636/637: Performed by: INTERNAL MEDICINE

## 2018-10-02 PROCEDURE — 80048 BASIC METABOLIC PNL TOTAL CA: CPT | Performed by: INTERNAL MEDICINE

## 2018-10-02 PROCEDURE — 36415 COLL VENOUS BLD VENIPUNCTURE: CPT | Performed by: INTERNAL MEDICINE

## 2018-10-02 PROCEDURE — 74011250637 HC RX REV CODE- 250/637: Performed by: INTERNAL MEDICINE

## 2018-10-02 RX ORDER — MECLIZINE HCL 12.5 MG 12.5 MG/1
12.5 TABLET ORAL
Status: DISCONTINUED | OUTPATIENT
Start: 2018-10-02 | End: 2018-10-04 | Stop reason: HOSPADM

## 2018-10-02 RX ADMIN — DOCUSATE SODIUM 100 MG: 100 CAPSULE, LIQUID FILLED ORAL at 08:03

## 2018-10-02 RX ADMIN — MECLIZINE 12.5 MG: 12.5 TABLET ORAL at 19:11

## 2018-10-02 RX ADMIN — ATORVASTATIN CALCIUM 40 MG: 40 TABLET, FILM COATED ORAL at 22:12

## 2018-10-02 RX ADMIN — METFORMIN HYDROCHLORIDE 500 MG: 500 TABLET ORAL at 08:03

## 2018-10-02 RX ADMIN — METFORMIN HYDROCHLORIDE 500 MG: 500 TABLET ORAL at 18:00

## 2018-10-02 RX ADMIN — INSULIN LISPRO 2 UNITS: 100 INJECTION, SOLUTION INTRAVENOUS; SUBCUTANEOUS at 12:57

## 2018-10-02 RX ADMIN — METOPROLOL TARTRATE 100 MG: 50 TABLET ORAL at 18:00

## 2018-10-02 RX ADMIN — DOCUSATE SODIUM 100 MG: 100 CAPSULE, LIQUID FILLED ORAL at 18:00

## 2018-10-02 RX ADMIN — RANOLAZINE 500 MG: 500 TABLET, FILM COATED, EXTENDED RELEASE ORAL at 13:15

## 2018-10-02 RX ADMIN — ASPIRIN 81 MG: 81 TABLET, COATED ORAL at 08:03

## 2018-10-02 RX ADMIN — NYSTATIN: 100000 POWDER TOPICAL at 22:11

## 2018-10-02 RX ADMIN — TAMSULOSIN HYDROCHLORIDE 0.4 MG: 0.4 CAPSULE ORAL at 22:11

## 2018-10-02 RX ADMIN — INSULIN GLARGINE 12 UNITS: 100 INJECTION, SOLUTION SUBCUTANEOUS at 08:08

## 2018-10-02 RX ADMIN — METOPROLOL TARTRATE 100 MG: 50 TABLET ORAL at 08:03

## 2018-10-02 RX ADMIN — DILTIAZEM HYDROCHLORIDE 120 MG: 120 CAPSULE, COATED, EXTENDED RELEASE ORAL at 12:57

## 2018-10-02 RX ADMIN — NYSTATIN: 100000 POWDER TOPICAL at 09:00

## 2018-10-02 RX ADMIN — RANOLAZINE 500 MG: 500 TABLET, FILM COATED, EXTENDED RELEASE ORAL at 22:12

## 2018-10-02 RX ADMIN — RIVAROXABAN 20 MG: 20 TABLET, FILM COATED ORAL at 18:00

## 2018-10-02 NOTE — ROUTINE PROCESS
Bedside and Verbal shift change report given to [address] (oncoming nurse) by Pearl Hoff, RN,me (offgoing nurse). Report included the following information SBAR, Kardex and MAR. Qh visual checks and 24h chart checks done.

## 2018-10-02 NOTE — ROUTINE PROCESS
Patient complained of dizziness, also noted to be clammy. Blood suger 158,  B/P 182/62 Heart rate 72 at 0803; Recheck of B/P 162/69 heart rate 65 at 0808. Dr. Ata Perez paged and returned call to us and new orders received. Noted patient has not had a BM for 3 days, will give PRN when he returns from his Ortho appointment.

## 2018-10-02 NOTE — ROUTINE PROCESS
Patient complain of not feeling well since 0700 this morning (please see day shift nurse report by Indira Abad RN) Patient went to follow up appointment and had a good report. No new orders. Vitals signs stable. Blood sugar 146. Denies pain. Refused physical therapy. Says his only complaint is he feels weak. Had huge bowel movement this am after performing ADLs. Spoke with Dr Brady Gifford. No new orders given. Will continue to monitor.

## 2018-10-02 NOTE — PROGRESS NOTES
The patient was offered a group activity of listening to music played by a guitarist on October 1, 2018. The patient declined this activity. The  will continue to offer group and 1:1 activities and encourage the patient to participate in the activities.

## 2018-10-02 NOTE — ROUTINE PROCESS
Patient says he is unable to walk to bathroom as before because of dizziness. Says he has vertigo and takes Meclizine. Says he hasn't had any of this med since admission to hospital. Spoke with Dr Addis Myles. Meclizine ordered. Will continue to monitor.

## 2018-10-02 NOTE — ROUTINE PROCESS
Bedside and verbal shift report given to DAMIÁN Castro (oncoming nurse) by ANEESH Weber LPN (off going nurse).  Report included SBAR, MAR and Kardex

## 2018-10-02 NOTE — ROUTINE PROCESS
Lying in bed stated he felt little better, no longer clammy. Encouraged to eat some of his breakfast. Lab collection called to inquire when stat labs would be drawn. They stated they where on their way up.

## 2018-10-02 NOTE — ROUTINE PROCESS
To ortho appointment in [address] via wheelchair with wife and nurses aide transporting.

## 2018-10-02 NOTE — PROGRESS NOTES
Problem: Mobility Impaired (Adult and Pediatric)  Goal: *Acute Goals and Plan of Care (Insert Text)  Physical Therapy Goals    Long term goals:  Initiated 9/17/2018 and to be accomplished within 3-4 weeks. (Updated 9/28/18)     1. Patient will move from supine to sit and sit to supine  in bed with modified independence. (Achieved)  2. Patient will transfer from bed to chair and chair to bed with modified independence using the least restrictive device. (Achieved with RW)  3.  Patient will perform sit to stand with modified independence. (Achieved)  4. Patient will ambulate with modified independence for 250 feet with the least restrictive device. (Progressing; 250ft with RW and (S))  5. Patient will ascend/descend 4 stairs with right handrail, 15 stairs with left handrail(s) with supervision. (Progressing; 10 steps with L HR and side-stepping pattern with SBA, 10 steps with B HR and SBA)    Short term goals: Initiated 9/17/2018, Revised 9/18/18 and to be accomplished within 1-2 weeks (updated 9/28/18)    1. Patient will move from supine to sit and sit to supine  in bed with supervision/set-up. (Achieved)     2. Patient will perform sit to stand with supervision/set-up. (Achieved)  3. Patient will transfer from bed to chair and chair to bed with supervision/set-up using the least restrictive device. (Achieved)  4. Patient will ambulate with supervision/set-up for 250 feet with the least restrictive device. (Achieved with RW)  5.  Patient will ascend/descend 4 stairs with right handrail(s), 15 stairs left handrail with close supervision/set-up. (Progressing; 10 steps with L HR and SBA, 10 steps with B HR and SBA)  6. Patient will verbalize and demonstrate 3/3 lumbar precautions. (Achieved)   7. Patient will demonstrate compliance with lumbar precautions greater than 90% of session.    (Achieved)    Physical Therapy Goals  Short term goals: Initiated 9/17/2018, Revised 9/18/18 and to be accomplished within 1-2 weeks (updated 9/21/18)    1. Patient will move from supine to sit and sit to supine  in bed with supervision/set-up. (Progressing; SBA)     2.  Patient will perform sit to stand with supervision/set-up. (Progressing; SBA)  3. Patient will transfer from bed to chair and chair to bed with supervision/set-up using the least restrictive device. (Progressing; CGA using RW)  4.  Patient will ambulate with supervision/set-up for 250 feet with the least restrictive device. (Progressing; CGA x 170 ft using RW)  5.  Patient will ascend/descend 4 stairs with right handrail(s), 15 stairs left handrail with close supervision/set-up. (Progressing; CGA x 5 stairs using B HR)  6. Patient will verbalize and demonstrate 3/3 lumbar precautions. (Progressing; cueing required for 2/3 lumbar precautions)   7. Patient will demonstrate compliance with lumbar precautions greater than 90% of session. (Achieved)      Long term goals:  Initiated 9/17/2018 and to be accomplished within 3-4 weeks. 1.  Patient will move from supine to sit and sit to supine  in bed with modified independence. 2.  Patient will transfer from bed to chair and chair to bed with modified independence using the least restrictive device. 3.  Patient will perform sit to stand with modified independence. 4.  Patient will ambulate with modified independence for 250 feet with the least restrictive device. 5.  Patient will ascend/descend 4 stairs with right handrail, 15 stairs with left handrail(s) with supervision. Physical Therapist: Alannah Khan, PT on 9/17/2018            [address] [place] [address]   physical Therapy Daily Treatment note      Patient:  Toni Allen (7 y.o. male)               Date: 10/2/2018    Physician: Emmy Cárdenas MD  Primary Diagnosis: spinal stenosis          Treatment Diagnosis  Treatment Diagnosis: muscle weakness  Treatment Diagnosis 2: increased need for assist with self care   Precautions: Back, Spinal, Fall (TLSO when OOB)  Vital Signs  Vital Signs  Temp: 98 °F (36.7 °C)  Temp Source: Oral  Pulse (Heart Rate): 68  Heart Rate Source: Monitor  Resp Rate: 20  O2 Sat (%): 98 %  Level of Consciousness: Alert  BP: 132/66  MAP (Monitor): 72  MAP (Calculated): 88  BP 1 Method: Automatic  BP 1 Location: Right arm  BP Patient Position: At rest  MEWS Score: 1     Cognitive Status:  Mental Status  Neurologic State: Alert; Restless  Orientation Level: Oriented X4  Cognition: Follows commands  Pain  Pain Screen  Pain Scale 1: Numeric (0 - 10)  Pain Intensity 1: 0  Patient Stated Pain Goal: 0  Bed Mobility Training     Balance     Transfer Training        Gait Training              Therapeutic Exercise:    Pt presents in bed, pt and wife describe pt being unable to participate at this time. Pt describes feeling badly since about 7am, stating that he felt very weak. Education on compliance with HEP of ankle pumps for circulation, pt properly demonstrated. Will f/u next treatment day. Patient/Caregiver Education:   Pt /Caregiver Education on (see above). ASSESSMENT:  Patient continues to benefit from Skilled PT services to improve gait, balance. Progression toward goals:  []      Improving appropriately and progressing toward goals  [x]      Improving slowly and progressing toward goals  []      Not making progress toward goals and plan of care will be adjusted     Treatment session: 15 minutes.   Therapist:   Jeanmarie Hess, PT,          10/2/2018

## 2018-10-02 NOTE — PROGRESS NOTES
Problem: Self Care Deficits Care Plan (Adult)  Goal: *Acute Goals and Plan of Care (Insert Text)  OCCUPATIONAL THERAPY SHORT TERM GOALS      LATE ENTRY for 9/28/18    1. Patient will perform Upper body ADL's without adaptive equipment with Mod I.  2.  Patient will perform Lower body ADL's with adaptive equipment as needed with Mod I while adhering to back precautions. 3.  Patient will perform toileting task with Mod I with Good safety to reduce falls risk. 4.  Patient will perform functional transfers with Rolling Walker and Mod I.  5.  Patient will perform standing static/dynamic balance activities for improved ADL/IADL function with Mod I and Good balance and safety awareness. 6.  Patient will improve Barthel index scores to atleast 85/100 to improve functional mobility. Updated 9/21/18    1. Patient will perform Upper body ADL's without adaptive equipment with supervision/set-up. (goal met-UPG Mod I)  2. Patient will perform Lower body ADL's with adaptive equipment as needed with moderate assistance while adhering to back precautions. (goal met-UPG Mod I)  3. Patient will perform toileting task with moderate assistance with Good safety to reduce falls risk. (goal met-UPG Mod I)  4. Patient will perform functional transfers with Rolling Walker and CGA. (goal met-UPG Mod I)  5. Patient will perform standing static/dynamic balance activities for improved ADL/IADL function with CGA and Fair- balance and safety awareness. (goal met-UPG Mod I)  6. Patient will improve Barthel index scores to atleast 75/100 to improve functional mobility. (goal met-UPG 85/100)    Initiated 9/17/2018 and to be accomplished within 2 Week(s)    1. Patient will perform Upper body ADL's without adaptive equipment with supervision/set-up.(progressing-currently Min A)  2.   Patient will perform Lower body ADL's with adaptive equipment as needed with moderate assistance while adhering to back precautions. (progressing-currently Max A)  3. Patient will perform toileting task with moderate assistance with Good safety to reduce falls risk. (progressing-currently Max A)  4. Patient will perform functional transfers with Rolling Walker and minimal assist. (goal met-UPG CGA)  5. Patient will perform standing static/dynamic balance activities for improved ADL/IADL function with minimal assistance and Fair- balance and safety awareness. (goal met-UPG CGA)  6. Patient will improve Barthel index scores to atleast 75/100 to improve functional mobility. (progressing)      OCCUPATIONAL THERAPY LONG TERM GOALS   Initiated 9/17/2018 and to be accomplished within 4 Week(s)    1. Patient will perform ADL's & IADLs with adaptive equipment as needed with modified independence. 2.  Patient will perform toileting task with modified independence with Good safety to reduce falls risk. 3.  Patient will perform all functional transfers utilizing least restrictive device and durable medical equipment as needed with modified independence and Good balance and safety awareness. 4.  Patient will perform standing static/dynamic activity for improved ADL/IADL function with modified independence and Good balance and safety awareness. 5.  Patient will improve Barthel index score to 95/100 to improve independence with mobility. Therapist: Melissa Ascencio    9/17/2018       Time Calculation: 39 mins      [facility]   Occupational Therapy Daily Treatment note      Patient:  Fernanda Ellison (70 y.o. male)       Date: 10/2/2018  Attending Physician: Reddy Lazcano MD  Primary Diagnosis: spinal stenosis    Treatment Diagnosis  Treatment Diagnosis: muscle weakness  Treatment Diagnosis 2: increased need for assist with self care    Precautions : Precautions at Admission: Back, Spinal, Fall (TLSO when OOB)  Vital Signs:  Vital Signs  Temp: 98.7 °F (37.1 °C)  Temp Source: Oral  Pulse (Heart Rate): 68  Resp Rate: 18  O2 Sat (%): 99 %  BP: 169/76  MAP (Monitor): 72  MAP (Calculated): 107  BP 1 Method: Automatic  BP 1 Location: Right arm  BP Patient Position: At rest     Cognitive Status:  Mental Status  Neurologic State: Alert; Restless  Orientation Level: Oriented X4  Cognition: Follows commands  Pain:  Pain Screen  Pain Scale 1: Numeric (0 - 10)  Pain Intensity 1: 0  Patient Stated Pain Goal: 0  Pain Scale 1: Numeric (0 - 10)       Therapeutic Activities:  Patient presents in bed, resting wit wife and son present. ROSANGELA encouraged patient to call for assistance with bathroom mobility due to not feeling well. Patient reports he will when his wife leaves due to she has been helping him while visiting. Patient/Caregiver Education:    Pt. Faustino Swain Education on see above.       ASSESSMENT:  Patient continues to demonstrate the need for skilled Occupational Therapy services to improve dynamic standing balance needed for simple home management  Progression toward goals:  [x]      Improving appropriately and progressing toward goals  []      Improving slowly and progressing toward goals  []      Not making progress toward goals and plan of care will be adjusted     Treatment session:   15 minutes    Therapist:    ROSANGELA Pastrana,  10/2/2018

## 2018-10-02 NOTE — ROUTINE PROCESS
Offered the flu and pneumonia vaccine, patient refused stating he feels so bad and wants to get from PCP. Educated on importance of both injections, He replied I will wait.

## 2018-10-02 NOTE — PROGRESS NOTES
The patient was offered a group activity of participating in an activity hour to engage in an activity of their choice (puzzles, cards, coloring, TV, reading etc.), with assistance from the  on September 28, 2018. The patient declined this activity. The  will continue to offer group and 1:1 activities and encourage the patient to participate in the activities.

## 2018-10-03 LAB
GLUCOSE BLD STRIP.AUTO-MCNC: 100 MG/DL (ref 70–110)
GLUCOSE BLD STRIP.AUTO-MCNC: 116 MG/DL (ref 70–110)
GLUCOSE BLD STRIP.AUTO-MCNC: 119 MG/DL (ref 70–110)
GLUCOSE BLD STRIP.AUTO-MCNC: 209 MG/DL (ref 70–110)

## 2018-10-03 PROCEDURE — 82962 GLUCOSE BLOOD TEST: CPT

## 2018-10-03 PROCEDURE — 74011636637 HC RX REV CODE- 636/637: Performed by: INTERNAL MEDICINE

## 2018-10-03 PROCEDURE — 74011250637 HC RX REV CODE- 250/637: Performed by: INTERNAL MEDICINE

## 2018-10-03 RX ADMIN — RANOLAZINE 500 MG: 500 TABLET, FILM COATED, EXTENDED RELEASE ORAL at 17:41

## 2018-10-03 RX ADMIN — METFORMIN HYDROCHLORIDE 500 MG: 500 TABLET ORAL at 17:40

## 2018-10-03 RX ADMIN — DOCUSATE SODIUM 100 MG: 100 CAPSULE, LIQUID FILLED ORAL at 09:22

## 2018-10-03 RX ADMIN — METOPROLOL TARTRATE 100 MG: 50 TABLET ORAL at 09:21

## 2018-10-03 RX ADMIN — TAMSULOSIN HYDROCHLORIDE 0.4 MG: 0.4 CAPSULE ORAL at 21:00

## 2018-10-03 RX ADMIN — RANOLAZINE 500 MG: 500 TABLET, FILM COATED, EXTENDED RELEASE ORAL at 09:21

## 2018-10-03 RX ADMIN — ASPIRIN 81 MG: 81 TABLET, COATED ORAL at 09:22

## 2018-10-03 RX ADMIN — INSULIN LISPRO 4 UNITS: 100 INJECTION, SOLUTION INTRAVENOUS; SUBCUTANEOUS at 17:38

## 2018-10-03 RX ADMIN — ACETAMINOPHEN 650 MG: 325 TABLET, FILM COATED ORAL at 09:22

## 2018-10-03 RX ADMIN — DILTIAZEM HYDROCHLORIDE 120 MG: 120 CAPSULE, COATED, EXTENDED RELEASE ORAL at 09:22

## 2018-10-03 RX ADMIN — RIVAROXABAN 20 MG: 20 TABLET, FILM COATED ORAL at 17:41

## 2018-10-03 RX ADMIN — DOCUSATE SODIUM 100 MG: 100 CAPSULE, LIQUID FILLED ORAL at 17:41

## 2018-10-03 RX ADMIN — ATORVASTATIN CALCIUM 40 MG: 40 TABLET, FILM COATED ORAL at 21:01

## 2018-10-03 RX ADMIN — METFORMIN HYDROCHLORIDE 500 MG: 500 TABLET ORAL at 09:21

## 2018-10-03 RX ADMIN — METOPROLOL TARTRATE 100 MG: 50 TABLET ORAL at 17:41

## 2018-10-03 RX ADMIN — INSULIN GLARGINE 12 UNITS: 100 INJECTION, SOLUTION SUBCUTANEOUS at 09:00

## 2018-10-03 NOTE — PROGRESS NOTES
conducted a Follow up consultation and Spiritual Assessment for Pradip Palencia, who is a 70 y.o.,male. The  provided the following Interventions:  Continued the relationship of care and support. Listened empathically. Offered prayer and assurance of continued prayer on patients behalf. The following outcomes were achieved:  Patient expressed gratitude for pastoral care visit. Assessment:  There are no further spiritual or Restoration issues which require Spiritual Care Services interventions at this time. Plan:  Chaplains will continue to follow and will provide pastoral care on an as needed/requested basis.  recommends bedside caregivers page  on duty if patient shows signs of acute spiritual or emotional distress.      [address] [facility]  [phone]

## 2018-10-03 NOTE — PROGRESS NOTES
Problem: Self Care Deficits Care Plan (Adult)  Goal: *Acute Goals and Plan of Care (Insert Text)  OCCUPATIONAL THERAPY SHORT TERM GOALS      LATE ENTRY for 9/28/18    1. Patient will perform Upper body ADL's without adaptive equipment with Mod I. (goal met)  2. Patient will perform Lower body ADL's with adaptive equipment as needed with Mod I while adhering to back precautions. (goal met)  3. Patient will perform toileting task with Mod I with Good safety to reduce falls risk. (goal met)  4. Patient will perform functional transfers with Rolling Walker and Mod I.(goal met)  5. Patient will perform standing static/dynamic balance activities for improved ADL/IADL function with Mod I and Good balance and safety awareness. (goal met)  6. Patient will improve Barthel index scores to atleast 85/100 to improve functional mobility. (goal met)    Updated 9/21/18    1. Patient will perform Upper body ADL's without adaptive equipment with supervision/set-up. (goal met-UPG Mod I)  2. Patient will perform Lower body ADL's with adaptive equipment as needed with moderate assistance while adhering to back precautions. (goal met-UPG Mod I)  3. Patient will perform toileting task with moderate assistance with Good safety to reduce falls risk. (goal met-UPG Mod I)  4. Patient will perform functional transfers with Rolling Walker and CGA. (goal met-UPG Mod I)  5. Patient will perform standing static/dynamic balance activities for improved ADL/IADL function with CGA and Fair- balance and safety awareness. (goal met-UPG Mod I)  6. Patient will improve Barthel index scores to atleast 75/100 to improve functional mobility. (goal met-UPG 85/100)    Initiated 9/17/2018 and to be accomplished within 2 Week(s)    1. Patient will perform Upper body ADL's without adaptive equipment with supervision/set-up.(progressing-currently Min A)  2.   Patient will perform Lower body ADL's with adaptive equipment as needed with moderate assistance while adhering to back precautions. (progressing-currently Max A)  3. Patient will perform toileting task with moderate assistance with Good safety to reduce falls risk. (progressing-currently Max A)  4. Patient will perform functional transfers with Rolling Walker and minimal assist. (goal met-UPG CGA)  5. Patient will perform standing static/dynamic balance activities for improved ADL/IADL function with minimal assistance and Fair- balance and safety awareness. (goal met-UPG CGA)  6. Patient will improve Barthel index scores to atleast 75/100 to improve functional mobility. (progressing)      OCCUPATIONAL THERAPY LONG TERM GOALS   Initiated 9/17/2018 and to be accomplished within 4 Week(s)    1. Patient will perform ADL's & IADLs with adaptive equipment as needed with modified independence.(goal met)  2. Patient will perform toileting task with modified independence with Good safety to reduce falls risk.  (goal met)  3. Patient will perform all functional transfers utilizing least restrictive device and durable medical equipment as needed with modified independence and Good balance and safety awareness.(goal met)  4. Patient will perform standing static/dynamic activity for improved ADL/IADL function with modified independence and Good balance and safety awareness.(goal met)  5. Patient will improve Barthel index score to 95/100 to improve independence with mobility. (goal not met)      Therapist: Rosario Syed    9/17/2018       Time Calculation: 45 mins      [address] THERAPY DISCHARGE SUMMARY      Patient:  Collin Murphy (24 y.o. male)               Date: 10/3/2018    Attending Physician: Ventura Holcomb MD  Primary Diagnosis: spinal stenosis       Treatment Diagnosis  Treatment Diagnosis: muscle weakness  Treatment Diagnosis 2: increased need for assist with self care          Precautions: Back, Spinal, Fall (TLSO when OOB)     Medical History:   Past Medical History:   Diagnosis Date    [facility]     CAD (coronary artery disease)     Diabetes ([facility]     Hypertension     Meniere's disease      Past Surgical History:   Procedure Laterality Date    HX HEART CATHETERIZATION      Stent    HX ORTHOPAEDIC      cervical sx       Reason for Discharge: [x]Goals Met   []Met highest potential  []Hospitalized   []  Progress ceased  []   []Other: Patient/family requesting D/C from facility. Discharge Location:  [x]Private Residence  [] residential []LTC  [] [facility] [] 24 hr. Supervision for safety    Summarize skilled services provided and significant progress attained since last daily/weekly note (include individualized treatment techniques and standardized tests):   Patient has received skilled OT services from 18 to 10/03/18 and has made Good progress towards their OT goals.   Improvements noted in: increased independence, performance, safety, and static standing balance needed for grooming, bathing, upper body dressing, lower body dressing, toileting and toilet transfer    Summary of education/recommendation provided to Patient/Caregivers in the following areas: Remove barriers/rugs, Reacher and Stocking Aid, mobility w/Rolling Walker for grooming, bathing, upper body dressing, lower body dressing, toileting and toilet transfer       Objective Data:      INITIAL ASSESSMENT DISCHARGE ASSESSMENT   COGNITIVE STATUS: COGNITIVE STATUS:   Neurologic State: Alert  Orientation Level: Oriented X4  Cognition: Follows commands, Decreased attention/concentration  Perception: Appears intact  Perseveration: No perseveration noted  Safety/Judgement: Fall prevention Mental Status  Neurologic State: Alert  Orientation Level: Oriented X4  Cognition: Follows commands  Perception: Appears intact  Perseveration: No perseveration noted  Safety/Judgement: Fall prevention   PAIN: PAIN:   Pain Scale 1: Numeric (0 - 10)  Pain Intensity 1: 5  Pain Onset 1: Acute  Pain Location 1: Chest  Pain Orientation 1: Lower  Pain Description 1: Heaviness, Throbbing  Pain Intervention(s) 1: Medication (see MAR)  Patient Stated Pain Goal: 0  Pain Reassessment 1: Yes Pain Screen  Pain Scale 1: Visual  Pain Intensity 1: 0  Pain Onset 1: acute  Pain Location 1: Back, Incisional  Pain Orientation 1: Lower  Pain Description 1: Aching  Pain Intervention(s) 1: Medication (see MAR)  Patient Stated Pain Goal: 0  Pain Reassessment 1: Patient sleeping   BED MOBILITY BED MOBILITY   Rolling: Contact guard assistance  Supine to Sit: Stand-by assistance  Sit to Supine: Stand-by assistance  Scooting: Stand-by assistance Bed Mobility  Rolling: Supervision  Supine to Sit: Modified independent  Sit to Supine: Modified independent  Scooting: Supervision   ADL SELF CARE ADL SELF CARE   Oral Facial Hygiene/Grooming: Contact guard assistance (@ stand with RW)  Type of Bath: Basin/Soap/Water  Lower Body Dressing: Contact guard assistance (with AE)  Toileting: Contact guard assistance Basic ADL  Oral Facial Hygiene/Grooming: Contact guard assistance (@ stand with RW)  Type of Bath: Basin/Soap/Water  Lower Body Dressing: Contact guard assistance (with AE)  Toileting: Contact guard assistance   ADL INTERVENTION ADL INTERVENTION   Bathing Assistance: Contact guard assistance  Position Performed:  (seated w/c level at sinkside) Upper Body Bathing  Bathing Assistance: Stand-by assistance (& dep w/ backj)  Position Performed:  (seated w/c level at sinkside)    Upper Body Dressing Assistance  Dressing Assistance: Contact guard assistance  Orthotics(Brace): Minimum assistance  Shirt simulation with hospital gown: Minimum  assistance    Lower Body Bathing  Bathing Assistance:  Moderate assistance  Perineal  : Moderate assistance  Position Performed:  (seated & in stance w/ RW)    Toileting  Toileting Assistance: Modified independent  Bladder Hygiene: Modified independent  Clothing Management: Modified independent  Adaptive Equipment: Elevated seat, Walker, Grab bars    Grooming  Grooming Assistance: Contact guard assistance  Washing Face: Stand-by assistance (seated w/c level at sinkside)  Washing Hands: Contact guard assistance ((able to tolerate for 1 1/2 prior to requesting to sit))  Brushing Teeth: Stand-by assistance (seated w/c level at sinkside using spittoon )    Feeding  Feeding Assistance: Modified independent   TRANSFERS TRANSFERS   Sit to Stand: Contact guard assistance  Stand to Sit: Contact guard assistance  Bed to Chair: Contact guard assistance  Bathroom Mobility: Contact guard assistance  Toilet Transfer :  (unable 2/2 drowsiness s/p p! meds and back p! 10/10) Functional Transfers  Sit to Stand: Supervision  Stand to Sit: Supervision  Bed to Chair: Modified independent  Bathroom Mobility: Modified independent  Toilet Transfer : Modified independent (w/ RW)   BALANCE BALANCE   Sitting:  (unable 2/2 drowsiness s/p p! meds and back p! 10/10)  Sitting - Static: Fair (occasional) (+)  Sitting - Dynamic: Fair (occasional)  Standing:  (unable 2/2 drowsiness s/p p! meds and back p! 10/10)  Standing - Static: Fair  Standing - Dynamic : Fair Balance  Sitting: With support  Sitting - Static: Good (unsupported)  Sitting - Dynamic: Good (unsupported)  Standing: With support  Standing - Static: Good  Standing - Dynamic : Fair (+)   GROSS ASSESSMENT  GROSS ASSESSMENT   AROM: Within functional limits (BUEs)  Strength: Generally decreased, functional (BUEs MMT not performed 2/2 back precautions, grossly 3/5)  Coordination: Within functional limits (BUEs)  Tone: Normal (BUEs)  Sensation: Intact (BUEs) Gross Assessment  AROM: Within functional limits (BUEs)  Strength: Generally decreased, functional (BUEs MMT not performed 2/2 back precautions, grossly 3/5)  Coordination: Within functional limits (BUEs)  Tone: Normal (BUEs)  Sensation: Intact (BUEs)   COORDINATION COORDINATION   Fine Motor Skills-Upper: Left Intact, Right Intact  Gross Motor Skills-Upper: Left Intact, Right Intact Coordination  Fine Motor Skills-Upper: Left Intact, Right Intact  Gross Motor Skills-Upper: Left Intact, Right Intact   VISUAL/PERCEPTUAL VISUAL/PERCEPTUAL   Corrective Lenses: Glasses   Vision  Corrective Lenses: Glasses     AUDITORY: AUDITORY:   Auditory Impairment: None Auditory  Auditory Impairment: None   I ADL'S:  I ADL'S:           THE BARTHEL INDEX    ACTIVITY   SCORE   FEEDING  0=unable  5=needs help cutting,spreading butter,etc., or modified diet  10= independent   10   BATHING  0=dependent  5=independent (or in shower   5   GROOMING  0=needs help  5=independent face/hair/teeth/shaving (implements provided)   5   DRESSING  0=dependent  5=needs help but can do about half unaided  10=independent(including buttons, zips,laces etc.)   10   BOWELS  0=incontinent  5=occasional accident  10=continent   10   BLADDER  0=incontinent, or catheterized and unable to manage alone  5=occasional accident  10=continent   10   TOILET USE  0=dependent  5=needs some help, but can do something alone  10=independent (on and off, dressing, wiping)   10   TRANSFER (BED TO CHAIR AND BACK)  0=unable, no sitting balance  5=major help(one or two people,physical), can sit  10=minor help(verbal or physical)  15=independent   10   MOBILITY (ON LEVEL SURFACES)  0=immobile or <50 yards  5=wheelchair independent,including corners,>50 yards  10=walkes with help of one person (verbal or physical) >50 yards  15=independent(but may use any aid; for example, stick) >50 yards   10   STAIRS  0=unable  5=needs help (verbal, physical, carrying aid)  10=independent   5              TOTAL:              85/100     Treatment : Reviewed back precautions with patient and be aware during ADL/IADL tasks and transfers for optimal safety and independence.     Discharge Recommendations:  [x] Home Exercise Program    [x] Elevated toilet seat/3 in 1 commode   [x] Shower Chair -recommend OT [facility] services to assess       []Shower Bench    [] Life Line/alert   [] Splint/orthotic application/schedule  [x] Grab Bars: Location -recommend OT  services to assess  [x] Home Health OT       [x] Rolling Walker       Treatment session:  20 minutes.     Therapist: Casa Orozco, [address]      Date:10/3/2018

## 2018-10-03 NOTE — PROGRESS NOTES
The patient was offered 1:1 activities with the  on October 1, 2018. The patient declined the activities. The  will continue to offer group and 1:1 activities and encourage the patient to participate in the activities.

## 2018-10-03 NOTE — PROGRESS NOTES
Problem: Mobility Impaired (Adult and Pediatric)  Goal: *Acute Goals and Plan of Care (Insert Text)  Physical Therapy Goals  Long term goals:  Initiated 9/17/2018 and to be accomplished within 3-4 weeks. (Updated 9/28/18)     1. Patient will move from supine to sit and sit to supine  in bed with modified independence. (Achieved)  2. Patient will transfer from bed to chair and chair to bed with modified independence using the least restrictive device. (Achieved with RW)  3.  Patient will perform sit to stand with modified independence. (Achieved)  4. Patient will ambulate with modified independence for 250 feet with the least restrictive device. (Progressed; 250ft with RW and (S))  5. Patient will ascend/descend 4 stairs with right handrail, 15 stairs with left handrail(s) with supervision. (Progressed; 10 steps with L HR and side-stepping pattern with SBA, 10 steps with B HR and SBA)    Short term goals: Initiated 9/17/2018, Revised 9/18/18 and to be accomplished within 1-2 weeks (updated 9/28/18)    1. Patient will move from supine to sit and sit to supine  in bed with supervision/set-up. (Achieved)     2. Patient will perform sit to stand with supervision/set-up. (Achieved)  3. Patient will transfer from bed to chair and chair to bed with supervision/set-up using the least restrictive device. (Achieved)  4. Patient will ambulate with supervision/set-up for 250 feet with the least restrictive device. (Achieved with RW)  5.  Patient will ascend/descend 4 stairs with right handrail(s), 15 stairs left handrail with close supervision/set-up. (Progressing; 10 steps with L HR and SBA, 10 steps with B HR and SBA)  6. Patient will verbalize and demonstrate 3/3 lumbar precautions. (Achieved)   7. Patient will demonstrate compliance with lumbar precautions greater than 90% of session.    (Achieved)    Physical Therapy Goals  Short term goals: Initiated 9/17/2018, Revised 9/18/18 and to be accomplished within 1-2 weeks (updated 9/21/18)    1. Patient will move from supine to sit and sit to supine  in bed with supervision/set-up. (Progressing; SBA)     2.  Patient will perform sit to stand with supervision/set-up. (Progressing; SBA)  3. Patient will transfer from bed to chair and chair to bed with supervision/set-up using the least restrictive device. (Progressing; CGA using RW)  4.  Patient will ambulate with supervision/set-up for 250 feet with the least restrictive device. (Progressing; CGA x 170 ft using RW)  5.  Patient will ascend/descend 4 stairs with right handrail(s), 15 stairs left handrail with close supervision/set-up. (Progressing; CGA x 5 stairs using B HR)  6. Patient will verbalize and demonstrate 3/3 lumbar precautions. (Progressing; cueing required for 2/3 lumbar precautions)   7. Patient will demonstrate compliance with lumbar precautions greater than 90% of session. (Achieved)      Long term goals:  Initiated 9/17/2018 and to be accomplished within 3-4 weeks. 1.  Patient will move from supine to sit and sit to supine  in bed with modified independence. 2.  Patient will transfer from bed to chair and chair to bed with modified independence using the least restrictive device. 3.  Patient will perform sit to stand with modified independence. 4.  Patient will ambulate with modified independence for 250 feet with the least restrictive device. 5.  Patient will ascend/descend 4 stairs with right handrail, 15 stairs with left handrail(s) with supervision. Physical Therapist: Alberta Malik PT on 9/17/2018            [address] [place] [address] physical Therapy Discharge Summary      Patient:  Deanna Suarez (36 y.o. male)                  Date: 10/3/2018    Attending Physician: Ananya Walker MD  Primary Diagnosis: spinal stenosis       Treatment Diagnosis  Treatment Diagnosis: muscle weakness  Treatment Diagnosis 2: difficulty in walking          Precautions: Back, Spinal, Fall (TLSO when OOB)   MEDICAL HISTORY:   Past Medical History:   Diagnosis Date    Atrial fibrillation ([facility] [place] [address]     CAD (coronary artery disease)     Diabetes ([facility] [place] [address]     Hypertension     Meniere's disease      Past Surgical History:   Procedure Laterality Date    HX HEART CATHETERIZATION      Stent    HX ORTHOPAEDIC      cervical sx       Reason for Discharge: [] Goals Met   [x]Met highest potential  []    [] Progress ceased  []Hospitalized  []Other: Pt/family request for early D/C from facility. Discharge Location:  [x] Private Residence  [] FPC  [] LTC  []  [facility] []Other: 24 hr.supervision for safety. Summarize skilled services provided and significant progress attained since last daily/weekly note (include individualized treatment techniques and standardized tests): This Patient has received skilled PT services from 18  through 10/3/18 and has made Good progress towards their PT goals. Improvements noted in bed mobility, sit <> stand and bed <> w/c transfers, ambulation using RW, stair training, balance.    Summary of education/recommendation provided to Patient/Caregivers:    Pt. Education provided to use [facility]      Objective Data:     INITIAL  ASSESSMENT DISCHARGE ASSESSMENT   COGNITIVE STATUS COGNITIVE STATUS   Neurologic State: Alert  Orientation Level: Oriented X4  Cognition: Follows commands, Decreased attention/concentration  Perception: Appears intact  Perseveration: No perseveration noted  Safety/Judgement: Fall prevention Neurologic State: Alert  Orientation Level: Oriented X4  Cognition: Appropriate for age attention/concentration  Perception: Appears intact  Perseveration: No perseveration noted  Safety/Judgement: Fall prevention   PAIN PAIN   Pain Scale 1: Numeric (0 - 10)  Pain Intensity 1: 5  Pain Onset 1: Acute  Pain Location 1: Chest  Pain Orientation 1: Lower  Pain Description 1: Heaviness, Throbbing  Pain Intervention(s) 1: Medication (see MAR)  Patient Stated Pain Goal: 0  Pain Reassessment 1: Yes Pain Scale 1: Numeric (0 - 10)  Pain Intensity 1: 0  Pain Onset 1: acute  Pain Location 1: Back, Incisional  Pain Orientation 1: Lower  Pain Description 1: Aching  Pain Intervention(s) 1: Medication (see MAR)  Patient Stated Pain Goal: 0  Pain Reassessment 1: Patient sleeping   GROSS ASSESSMENT GROSS ASSESSMENT   AROM: Within functional limits (BUEs)  Strength: Generally decreased, functional (BUEs MMT not performed 2/2 back precautions, grossly 3/5)  Coordination: Within functional limits (BUEs)  Tone: Normal (BUEs)  Sensation: Intact (BUEs) AROM: Within functional limits (BUEs)  Strength: Generally decreased, functional (BUEs MMT not performed 2/2 back precautions, grossly 3/5)  Coordination: Within functional limits (BUEs)  Tone: Normal (BUEs)  Sensation: Intact (BUEs)   BED MOBILITY BED MOBILITY   Rolling: Contact guard assistance  Supine to Sit: Stand-by assistance  Sit to Supine: Stand-by assistance  Scooting: Stand-by assistance Rolling: Supervision  Supine to Sit: Modified independent  Sit to Supine: Modified independent  Scooting: Supervision   GAIT GAIT   Base of Support: Center of gravity altered  Speed/Elena: Slow  Step Length: Left shortened, Right shortened  Gait Abnormalities: Decreased step clearance  Ambulation - Level of Assistance: Contact guard assistance  Distance (ft): 42 Feet (ft)  Assistive Device: Gait belt, Walker, rolling  Rail Use: Both  Stairs - Level of Assistance: Contact guard assistance  Number of Stairs Trained: 5  Interventions: Verbal cues Base of Support: Center of gravity altered  Speed/Elena: Slow  Step Length: Left shortened, Right shortened  Gait Abnormalities: Antalgic, Decreased step clearance  Ambulation - Level of Assistance: Supervision  Distance (ft): 105 Feet (ft) (x2 reps)  Assistive Device: Brace/Splint, Gait belt, Walker, rolling  Rail Use: Left   Stairs - Level of Assistance: Stand-by assistance  Number of Stairs Trained: 10  Interventions: Safety awareness training, Verbal cues                      With 5 turns. TRANSFERS TRANSFERS   Sit to Stand: Contact guard assistance  Stand to Sit: Contact guard assistance  Bed to Chair: Contact guard assistance Sit to Stand: Supervision  Stand to Sit: Supervision  Bed to Chair: Modified independent   BALANCE BALANCE   Sitting:  (unable 2/2 drowsiness s/p p! meds and back p! 10/10)  Sitting - Static: Fair (occasional) (+)  Sitting - Dynamic: Fair (occasional)  Standing:  (unable 2/2 drowsiness s/p p! meds and back p! 10/10)  Standing - Static: Fair  Standing - Dynamic : Fair Sitting: With support  Sitting - Static: Good (unsupported)  Sitting - Dynamic: Good (unsupported)  Standing: With support  Standing - Static: Good  Standing - Dynamic : Fair (+)   WHEELCHAIR MOBILITY/MGMT WHEELCHAIR MOBILITY/MGMT          N/A   Visual/Perceptual      Corrective Lenses: Glasses    Auditory:   Auditory  Auditory Impairment: None         Visual/Perceptual      Corrective Lenses: Glasses    Auditory:   Auditory  Auditory Impairment: None          Activity Tolerance:  Fair+                     Treatment:   Pt presents in bathroom in w/c, reporting feeling better compared to yesterday. Therapist educated on continued compliance with donning LSO when out of bed, lumbar precautions, and HEP of heel raises and toe raises. Pt verbalized understanding. Educated on continued need for having someone nearby when negotiating stairs, pt verbalized understanding and reported that wife will be present. Discharge Recommendations:  [x]  Home Exercise Program     [x]  Home Health PT   [x]  Remove throw rugs/clear environmental barriers    [x]  Assistance with: long distance ambulation, stair negotiation     [x]  Ambulation Device: Rolling Walker   [x]  Steps with right     []  Wheelchair    []  Life Line/alert   []  WC  Ramp       Treatment minutes: 16 minutes.     Therapist :  Jose R Coffman, PT Date:10/3/2018

## 2018-10-03 NOTE — ROUTINE PROCESS
Bedside and verbal shift report given to Rolan Tello RN (oncoming nurse) by ANEESH Pena LPN (off going nurse). Report included SBAR, MAR and Kardex.

## 2018-10-04 VITALS
RESPIRATION RATE: 18 BRPM | WEIGHT: 194.8 LBS | BODY MASS INDEX: 31.31 KG/M2 | DIASTOLIC BLOOD PRESSURE: 70 MMHG | OXYGEN SATURATION: 98 % | HEIGHT: 66 IN | TEMPERATURE: 97.7 F | HEART RATE: 62 BPM | SYSTOLIC BLOOD PRESSURE: 152 MMHG

## 2018-10-04 LAB
GLUCOSE BLD STRIP.AUTO-MCNC: 121 MG/DL (ref 70–110)
GLUCOSE BLD STRIP.AUTO-MCNC: 151 MG/DL (ref 70–110)

## 2018-10-04 PROCEDURE — 74011636637 HC RX REV CODE- 636/637: Performed by: INTERNAL MEDICINE

## 2018-10-04 PROCEDURE — 82962 GLUCOSE BLOOD TEST: CPT

## 2018-10-04 PROCEDURE — 74011250637 HC RX REV CODE- 250/637: Performed by: INTERNAL MEDICINE

## 2018-10-04 RX ADMIN — METFORMIN HYDROCHLORIDE 500 MG: 500 TABLET ORAL at 09:28

## 2018-10-04 RX ADMIN — ACETAMINOPHEN 650 MG: 325 TABLET, FILM COATED ORAL at 14:46

## 2018-10-04 RX ADMIN — INSULIN GLARGINE 12 UNITS: 100 INJECTION, SOLUTION SUBCUTANEOUS at 09:00

## 2018-10-04 RX ADMIN — DILTIAZEM HYDROCHLORIDE 120 MG: 120 CAPSULE, COATED, EXTENDED RELEASE ORAL at 09:28

## 2018-10-04 RX ADMIN — ASPIRIN 81 MG: 81 TABLET, COATED ORAL at 09:28

## 2018-10-04 RX ADMIN — METOPROLOL TARTRATE 100 MG: 50 TABLET ORAL at 09:28

## 2018-10-04 RX ADMIN — INSULIN LISPRO 2 UNITS: 100 INJECTION, SOLUTION INTRAVENOUS; SUBCUTANEOUS at 11:30

## 2018-10-04 RX ADMIN — RANOLAZINE 500 MG: 500 TABLET, FILM COATED, EXTENDED RELEASE ORAL at 09:28

## 2018-10-04 RX ADMIN — DOCUSATE SODIUM 100 MG: 100 CAPSULE, LIQUID FILLED ORAL at 09:28

## 2018-10-04 NOTE — DISCHARGE SUMMARY
DISCHARGE SUMMARY        NAME: Ana Linn    ADMIT DATE: 9/14/2018    PT NUMBER: [id number]              DISCHARGE DATE: 10/04/2018    ROOM NO: 3209/01    PHYSICIAN: Saima Tinsley MD    DISCHARGE DIAGNOSIS:   L/S stenosis  spondylosis       Decompression L1-2 2-5, posterolateral spine fusionL4-5 on 09/05/18  Back pain controlled   Right leg radicular pain resolved  Paroxysmal atrial fibrillation  Chronic OAC with xarelto  HTN  T2DM  Hiccups  Post op anemia  LAYLA post VenoFer infusion in acute care  CAD  3-vessel coronary artery disease with chronically occluded right coronary artery. He had previous stent to the proximal circumflex artery with significant disease distal left circumflex and distal LAD.      LEXISCAN NUCLEAR STRESS TEST 8/21/2018   Findings:  the stress myocardial perfusion study shows mild decreased perfusion inferior wall and inferolateral wall, the resting study shows normal myocardial perfusion. The EKG gated study shows normal wall motion normal contractility the calculated ejection fraction is 65%  IMPRESSION;  1 abnormal myocardial perfusion with inferolateral ischemia  2 normal left ventricular systolic function  [address]  COURSE: Patient basically had an uneventful stay at [facility] except for isolated episode of atrial fibrillation with rapid ventricular response, responded to oral Cardizem. No recurrence. He did have ischemic EKG changes which resolved after rate control achieved. He had no chest pain or shortness of breath. Patient participated actively with physical therapy and occupational therapy   Patient achieved maximum benefit from skilled physical therapy services, provided from 09/17/18 through 10/03/18. He demonstrated good progress. Diabetes under adequate control.     Visit Vitals    /70 (BP 1 Location: Right arm, BP Patient Position: At rest)    Pulse 62    Temp 97.7 °F (36.5 °C)    Resp 18    Ht 5' 6\" (1.676 m)    Wt 88.4 kg (194 lb 12.8 oz)    SpO2 98%    BMI 31.44 kg/m2     PHYSICAL EXAMINATION:  Awake alert conversative  LUNGS: CTA. BS Normal Bilaterally  HEART: RRR   Normal S1 S2. 2/6  Aortic systolic murmur No S3 S4  ABDOMEN: Soft. Normal BS. No tenderness. LE: No edema  PULSES: Radial 2+ Posterior Tibialis 2+    DISCHARGE MEDICATIONS:   ASA 81 mg one tablet PO daily  Atorvastatin 40 mg one tablet PO Q p.m. Diltiazem  mg one capsule PO daily  Colace 100 mg one capsule PO bid  Lantus 12 units subcutaneously QHS  Metformin 500 mg one tablet po BID with meals  Metoprolol tartrate 100 mg one tablet PO bid  Nitrodur 0.4 mg/hr patch daily  Xarelto 20 mg one tablet PO QPM with dinner  Renolazine ER (Ranexa) 500 mg one tablet PO BID  Flomax 0.4 mg one PO Q HS    DIET: [address]    DISPOSITION: home with family and  home health agency    DISCHARGE ACTIVITY:   Ambulatory with rolling  walker    CONDITION AT TIME OF DISCHARGE:    Ambulatory with assisted device (Rolling Walker). Tolerating diet. diabetes adequately controlled for age. Afebrile with stable vital signs.   Had large bowel movement yesterday !!!    Prescriptions written     FOLLOW - UP: Follow-up with Dr. Deborah Rome 12/11/18   F/U with Dr. Moe Jimenez 10/10/18    Jaci Masterson MD  10/4/2018  9:03 AM

## 2018-10-04 NOTE — PROGRESS NOTES
DISCHARGE SUMMARY from Nurse PATIENT INSTRUCTIONS: 
 
· Do not make important personal or business decisions · Do  not drink alcoholic beverages · If you have not urinated within 8 hours after discharge, please contact your surgeon on call. Report the following to your md 
· Excessive pain, swelling, redness or odor of or around the surgical area · Temperature over 100.5 · Nausea and vomiting lasting longer than 4 hours or if unable to take medications · Any signs of decreased circulation or nerve impairment to extremity: change in color, persistent  numbness, tingling, coldness or increase pain · Any questions What to do at Home: 
Recommended activity: {discharge activity:72982}, *** If you experience any of the following symptoms ***, please follow up with ***. *  Please give a list of your current medications to your Primary Care Provider. *  Please update this list whenever your medications are discontinued, doses are 
    changed, or new medications (including over-the-counter products) are added. *  Please carry medication information at all times in case of emergency situations. These are general instructions for a healthy lifestyle: No smoking/ No tobacco products/ Avoid exposure to second hand smoke Surgeon General's Warning:  Quitting smoking now greatly reduces serious risk to your health. Obesity, smoking, and sedentary lifestyle greatly increases your risk for illness A healthy diet, regular physical exercise & weight monitoring are important for maintaining a healthy lifestyle You may be retaining fluid if you have a history of heart failure or if you experience any of the following symptoms:  Weight gain of 3 pounds or more overnight or 5 pounds in a week, increased swelling in our hands or feet or shortness of breath while lying flat in bed. Please call your doctor as soon as you notice any of these symptoms; do not wait until your next office visit. Recognize signs and symptoms of STROKE: 
 
F-face looks uneven A-arms unable to move or move unevenly S-speech slurred or non-existent T-time-call 911 as soon as signs and symptoms begin-DO NOT go Back to bed or wait to see if you get better-TIME IS BRAIN. Warning Signs of HEART ATTACK Call 911 if you have these symptoms: 
? Chest discomfort. Most heart attacks involve discomfort in the center of the chest that lasts more than a few minutes, or that goes away and comes back. It can feel like uncomfortable pressure, squeezing, fullness, or pain. ? Discomfort in other areas of the upper body. Symptoms can include pain or discomfort in one or both arms, the back, neck, jaw, or stomach. ? Shortness of breath with or without chest discomfort. ? Other signs may include breaking out in a cold sweat, nausea, or lightheadedness. Don't wait more than five minutes to call [address]! Fast action can save your life. Calling 911 is almost always the fastest way to get lifesaving treatment. Emergency Medical Services staff can begin treatment when they arrive  up to an hour sooner than if someone gets to the hospital by car. The discharge information has been reviewed with the {PATIENT PARENT GUARDIAN:60264}. The {PATIENT PARENT GUARDIAN:62128} verbalized understanding. Discharge medications reviewed with the {Dishcarge meds reviewed OUUF:43609} and appropriate educational materials and side effects teaching were provided. ___________________________________________________________________________________________________________________________________

## 2018-10-04 NOTE — DISCHARGE INSTRUCTIONS
Ranolazine    Angina pectoris, chronic   initial, 500 mg ORALLY twice daily; increase to the maximum recommended dose of 1000 mg ORALLY twice daily as needed based on clinical symptoms  Atrial fibrillation; Prophylaxis   Atrial fibrillation         Learning About Type 2 Diabetes  What is type 2 diabetes? Insulin is a hormone that helps your body use sugar from your food as energy. Type 2 diabetes happens when your body can't use insulin the right way. Over time, the pancreas can't make enough insulin. If you don't have enough insulin, too much sugar stays in your blood. If you are overweight, get little or no exercise, or have type 2 diabetes in your family, you are more likely to have problems with the way insulin works in your body.  Americans, Hispanics, Native Americans,  Americans, and Pacific Islanders have a higher risk for type 2 diabetes. Type 2 diabetes can be prevented or delayed with a healthy lifestyle, which includes staying at a healthy weight, making smart food choices, and getting regular exercise. What can you expect with type 2 diabetes? Loly Lema keep hearing about how important it is to keep your blood sugar within a target range. That's because over time, high blood sugar can lead to serious problems. It can:  · Harm your eyes, nerves, and kidneys. · Damage your blood vessels, leading to heart disease and stroke. · Reduce blood flow and cause nerve damage to parts of your body, especially your feet. This can cause slow healing and pain when you walk. · Make your immune system weak and less able to fight infections. When people hear the word \"diabetes,\" they often think of problems like these. But daily care and treatment can help prevent or delay these problems. The goal is to keep your blood sugar in a target range. That's the best way to reduce your chance of having more problems from diabetes. What are the symptoms?   Some people who have type 2 diabetes may not have any symptoms early on. Many people with the disease don't even know they have it at first. But with time, diabetes starts to cause symptoms. You experience most symptoms of type 2 diabetes when your blood sugar is either too high or too low. The most common symptoms of high blood sugar include:  · Thirst.  · Frequent urination. · Weight loss. · Blurry vision. The symptoms of low blood sugar include:  · Sweating. · Shakiness. · Weakness. · Hunger. · Confusion. How can you prevent type 2 diabetes? The best way to prevent or delay type 2 diabetes is to adopt healthy habits, which include:  · Staying at a healthy weight. · Exercising regularly. · Eating healthy foods. How is type 2 diabetes treated? If you have type 2 diabetes, here are the most important things you can do. · Take your diabetes medicines. · Check your blood sugar as often as your doctor recommends. Also, get a hemoglobin A1c test at least every 6 months. · Try to eat a variety of foods and to spread carbohydrate throughout the day. Carbohydrate raises blood sugar higher and more quickly than any other nutrient does. Carbohydrate is found in sugar, breads and cereals, fruit, starchy vegetables such as potatoes and corn, and milk and yogurt. · Get at least 30 minutes of exercise on most days of the week. Walking is a good choice. You also may want to do other activities, such as running, swimming, cycling, or playing tennis or team sports. If your doctor says it's okay, do muscle-strengthening exercises at least 2 times a week. · See your doctor for checkups and tests on a regular schedule. · If you have high blood pressure or high cholesterol, take the medicines as prescribed by your doctor. · Do not smoke. Smoking can make health problems worse. This includes problems you might have with type 2 diabetes. If you need help quitting, talk to your doctor about stop-smoking programs and medicines.  These can increase your chances of quitting for good. Follow-up care is a key part of your treatment and safety. Be sure to make and go to all appointments, and call your doctor if you are having problems. It's also a good idea to know your test results and keep a list of the medicines you take. Where can you learn more? Go to http://kianna-angella.info/. Enter I188 in the search box to learn more about \"Learning About Type 2 Diabetes. \"  Current as of: December 7, 2017  Content Version: 11.8  © 3195-9094 [employer]. Care instructions adapted under license by [employer] (which disclaims liability or warranty for this information). If you have questions about a medical condition or this instruction, always ask your healthcare professional. [address] any warranty or liability for your use of this information.

## 2018-10-04 NOTE — PROGRESS NOTES
Nutrition follow-up/Plan of care tcc    RECOMMENDATIONS:     1. Consistent CHO Diet  2. Monitor weight, labs and PO intake  3. RD to follow     GOALS:     1. Ongoing; PO intake meets >75% of protein/calorie needs by 10/11  2. Ongoing: Weight Maintenance (+/- 1-2) by 10/11       ASSESSMENT:     Wt status is classified as obese per BMI of 31.5. Variable PO intake. New weight not available on record. Labs noted. BG range (119-209) over the past 24 hours; A1C (6.9). Nutrition recommendations listed. RD to follow. Nutrition Risk:  [] High  [x] Moderate []  Low    SUBJECTIVE/OBJECTIVE:   (9/12): Pt admitted for lumbar stenosis. PMHx including CAD, HTN, DM, Afib and Meniere's disease. Pt seen in room OOB in chair eating lunch; with wife at bedside. Wife brought in some food from home. Observed ~25% of meal consumed during visit. Reports having a poor appetite for the past week (most likely d/t ileus; now resolved). Stated that the smell of food makes him nauseous and he has the hiccups. Denies having any food allergies or problems chewing/swallowing. Stated appetite is normally good at home. Does not want nutritional supplements at this time. Encouraged intake and will monitor.     (9/16): Transferred from [facility] to [address] [place] [address],[address] on 9/14/2018. States UBW of 190 lb. Stated appetite is improving here in hospital and was eating well at home. Observed 50% intake of lunch meal during visit. States he doesn't like to eat a lot of \"starchy\" foods and stated food preferences. Discussed preferences with CA to be implemented. Encouraged intake and will monitor. (9/20): Pt seen in room after lunch with wife at bedside. Observed 100% of meal consumed, but it was a light lunch. Reports appetite is slowly improving. Weights noted. Will monitor. (9/27): Pt seen in room OOB in chair after lunch. Reports he is doing well and his appetite has improved. Stated he ate most of his lunch today. Glycemic control is following him.  Nothing to address at this time. Will monitor. (10/4): Pt seen in room OOB in chair after lunch. Reports variable intake of meals. Glycemic control is following him. Nothing to address at this time. Will monitor.      Information Obtained from:    [x] Chart Review   [x] Patient   [] Family/Caregiver   [] Nurse/Physician   [] Interdisciplinary Meeting/Rounds      Diet: Consistent CHO  Medications: [x] Reviewed  Lipitor, Cardizem, Colace, Lopressor,Ranexa, Xarelto     Allergies: [x] Reviewed   Patient Active Problem List   Diagnosis Code    Lumbar stenosis M48.061    Paroxysmal A-fib ([facility] I48.0    Microcytic anemia D50.9    DM (diabetes mellitus) ([facility] E11.9    HTN (hypertension) I10    CAD (coronary artery disease) I25.10    Hiccup R06.6    Ileus ([facility] K56.7    Urine retention R33.9    Normal cardiac stress test [id number]       Past Medical History:   Diagnosis Date    Atrial fibrillation ([facility]     CAD (coronary artery disease)     Diabetes ([facility]     Hypertension     Meniere's disease       Labs:    Lab Results   Component Value Date/Time    Sodium 136 10/02/2018 01:49 PM    Potassium 4.4 10/02/2018 01:49 PM    Chloride 98 (L) 10/02/2018 01:49 PM    CO2 25 10/02/2018 01:49 PM    Anion gap 13 10/02/2018 01:49 PM    Glucose 146 (H) 10/02/2018 01:49 PM    BUN 19 (H) 10/02/2018 01:49 PM    Creatinine 1.03 10/02/2018 01:49 PM    Calcium 8.5 10/02/2018 01:49 PM    Magnesium 1.4 (L) 12/04/2010 04:05 AM    Albumin 2.6 (L) 09/08/2018 04:00 AM     Anthropometrics: BMI (calculated): 31.5  Last 3 Recorded Weights in this Encounter    09/14/18 1726 09/19/18 0817 09/25/18 1620   Weight: 92 kg (202 lb 12.8 oz) 88.8 kg (195 lb 12.8 oz) 88.4 kg (194 lb 12.8 oz)      Ht Readings from Last 1 Encounters:   09/14/18 5' 6\" (1.676 m)     Weight Metrics 9/25/2018 9/13/2018 9/5/2018   Weight 194 lb 12.8 oz 208 lb -   BMI 31.44 kg/m2 - 33.57 kg/m2       No data found.    [] Weight Loss  [] Weight Gain   [x] Weight Stable ( no new weight on record since 9/25)    Estimated Nutrition Needs: [x] MSJ    Calories: 1335-0716 kcal Based on:   [x] Actual BW    Protein:   94 g Based on:   [x] Actual BW    Fluid:       2200- 2364 ml Based on:   [x] Actual BW     Nutrition Problems Identified:   [x] Suboptimal PO intake (improving)  [] Food Allergies  [] Difficulty chewing/swallowing/poor dentition  [] Constipation/Diarrhea   [] Nausea/Vomiting   [] None  [] Other:     Plan:   [x] Therapeutic Diet  []  Obtained/adjusted food preferences/tolerances and/or snacks options   []  Supplements added   [] Occupational therapy following for feeding techniques  []  HS snack added   []  Modify diet texture   []  Modify diet for food allergies   []  Educate patient   []  Assist with menu selection   [x]  Monitor PO intake on meal rounds   [x]  Continue inpatient monitoring and intervention   [x]  Participated in discharge planning/Interdisciplinary rounds/Team meetings   []  Other:     Education Needs:   [] Not appropriate for teaching at this time due to:   [x] Identified and addressed    Nutrition Monitoring and Evaluation:  [x] Continue ongoing monitoring and intervention  [] Other    [facility]

## 2018-10-04 NOTE — ROUTINE PROCESS
Bedside and Verbal shift change report given to Kennedy Moss (oncoming nurse) by Prem Shah RN (offgoing nurse). Report included the following information SBAR, Kardex and MAR.

## 2018-10-04 NOTE — PROGRESS NOTES
The patient was offered a group activity of playing  a word game of [employer] on October 3, 2018. The patient declined this activity. The  will continue to offer group and 1:1 activities and encourage the patient to participate in the activities.

## 2018-10-04 NOTE — ROUTINE PROCESS
Discharge order given by Charity Perrin. Home meds prescription and discharge instructions given to patient. ID band removed. PAtient went home at 1600hr.

## 2018-10-04 NOTE — PROGRESS NOTES
WOODY spoke with pt re: home health referral. Pt's wife requested that referral be made to [facility]. Pt signed the [place] of Choice and was given a copy. SW faxed home health referral to [facility].

## 2018-10-04 NOTE — PROGRESS NOTES
The patient was offered a group activity to discuss the morning news on October 2, 2018. The patient declined this activity. The  will continue to offer group and 1:1 activities and encourage the patient to participate in the activities.

## 2019-10-31 NOTE — PROGRESS NOTES
Follow up with Dr. Joel Dorado November 6th at 10:20    Problem: Self Care Deficits Care Plan (Adult)  Goal: *Acute Goals and Plan of Care (Insert Text)  OCCUPATIONAL THERAPY SHORT TERM GOALS      Updated 9/21/18    1. Patient will perform Upper body ADL's without adaptive equipment with supervision/set-up. 2.  Patient will perform Lower body ADL's with adaptive equipment as needed with moderate assistance while adhering to back precautions. 3.  Patient will perform toileting task with moderate assistance with Good safety to reduce falls risk. 4.  Patient will perform functional transfers with Rolling Walker and CGA. 5.  Patient will perform standing static/dynamic balance activities for improved ADL/IADL function with CGA and Fair- balance and safety awareness. 6.  Patient will improve Barthel index scores to atleast 75/100 to improve functional mobility. Initiated 9/17/2018 and to be accomplished within 2 Week(s)    1. Patient will perform Upper body ADL's without adaptive equipment with supervision/set-up.(progressing-currently Min A)  2. Patient will perform Lower body ADL's with adaptive equipment as needed with moderate assistance while adhering to back precautions. (progressing-currently Max A)  3. Patient will perform toileting task with moderate assistance with Good safety to reduce falls risk. (progressing-currently Max A)  4. Patient will perform functional transfers with Rolling Walker and minimal assist. (goal met-[facility] CGA)  5. Patient will perform standing static/dynamic balance activities for improved ADL/IADL function with minimal assistance and Fair- balance and safety awareness. (goal met-[facility] CGA)  6. Patient will improve Barthel index scores to atleast 75/100 to improve functional mobility. (progressing)      OCCUPATIONAL THERAPY LONG TERM GOALS   Initiated 9/17/2018 and to be accomplished within 4 Week(s)    1. Patient will perform ADL's & IADLs with adaptive equipment as needed with modified independence.   2.  Patient will perform toileting task with modified independence with Good safety to reduce falls risk. 3.  Patient will perform all functional transfers utilizing least restrictive device and durable medical equipment as needed with modified independence and Good balance and safety awareness. 4.  Patient will perform standing static/dynamic activity for improved ADL/IADL function with modified independence and Good balance and safety awareness. 5.  Patient will improve Barthel index score to 95/100 to improve independence with mobility. Therapist: Bhargavi Garner    9/17/2018       Time Calculation: 39 mins     [facility]   Occupational Therapy Daily Treatment note      Patient: Tod Carmona (70 y.o. male)       Date: 10/1/2018  Attending Physician: Flavia Thomas MD  Primary Diagnosis: spinal stenosis    Treatment Diagnosis  Treatment Diagnosis: muscle weakness  Treatment Diagnosis 2: increased need for assist with self care    Precautions : Precautions at Admission: Back, Spinal, Fall (TLSO when OOB)  Vital Signs:  Vital Signs  Pulse (Heart Rate): 67  BP: 135/71     Cognitive Status:  Mental Status  Neurologic State: Alert  Orientation Level: Oriented X4  Cognition: Follows commands  Pain:  Pain Screen  Pain Scale 1: Numeric (0 - 10)  Pain Intensity 1: 6  Pain Location 1: Back; Incisional  Pain Orientation 1: Lower  Pain Description 1: Aching  Pain Intervention(s) 1: Medication (see MAR)  Patient Stated Pain Goal: 0  Pain Scale 1: Numeric (0 - 10)  Gross Assessment:     Coordination:     Bed Mobility:  Bed Mobility  Supine to Sit: Modified independent  Sit to Supine: Modified independent  Transfers:  Functional Transfers  Sit to Stand: Supervision  Stand to Sit: Supervision  Bed to Chair: Modified independent     Balance:  Balance  Sitting: With support  Sitting - Static: Good (unsupported)  Sitting - Dynamic: Good (unsupported)  Standing: With support  Standing - Static: Good  Standing - Dynamic : Fair (+)  ADL Self Care:  Basic ADL  Type of Bath: Basin/Soap/Water       Therapeutic Activities:  Shadowed patient with bed mobility and bed>w/c transfer in order to assess safety and independence during routine. See above for levels of A needed. Item retrieval, at standing, within green T-Putty in order to challenge balance and stability during needed for ADLS. Patient was able to tolerate standing for 6 mins prior to reports of increased pain limiting him. Functional mobility utilizing RW from therapy room to patient room in order to increase functional activity tolerance and independence during task. Patient reports he feels he is increasing weight bearing on B UE when walking. ROSANGELA encouraged patient to increased B LE weight bearing when having less pain for optimal strengthening and safety. Patient/Caregiver Education:    Joe Dc Drafts Education on see above.       ASSESSMENT:  Patient continues to demonstrate the need for skilled Occupational Therapy services to improve dynamic standing balance needed for bathing  Progression toward goals:  [x]      Improving appropriately and progressing toward goals  []      Improving slowly and progressing toward goals  []      Not making progress toward goals and plan of care will be adjusted     Treatment session:   63 minutes    Therapist:    ROSANGELA Puentes,  10/1/2018 done

## (undated) DEVICE — TOWEL SURG W16XL26IN BLU NONFENESTRATED DLX ST 2 PER PK

## (undated) DEVICE — STERILE POLYISOPRENE POWDER-FREE SURGICAL GLOVES WITH EMOLLIENT COATING: Brand: PROTEXIS

## (undated) DEVICE — SYRINGE BLB 50CC IRRIG PLIABLE FNGR FLNG GRAD FLSK DISP

## (undated) DEVICE — HEX-LOCKING BLADE ELECTRODE: Brand: EDGE

## (undated) DEVICE — 10FR FRAZIER SUCTION HANDLE: Brand: CARDINAL HEALTH

## (undated) DEVICE — SYR 50ML LR LCK 1ML GRAD NSAF --

## (undated) DEVICE — SPINE PACK DEPAUL: Brand: MEDLINE INDUSTRIES, INC.

## (undated) DEVICE — THE MILL DISPOSABLE - MEDIUM

## (undated) DEVICE — SUTURE VCRL SZ 0 L36IN ABSRB UD L48MM CTX 1/2 CIR J978H

## (undated) DEVICE — STERILE LATEX POWDER-FREE SURGICAL GLOVESWITH NITRILE COATING: Brand: PROTEXIS

## (undated) DEVICE — SHEET,DRAPE,70X100,STERILE: Brand: MEDLINE

## (undated) DEVICE — THE MILL DISPOSABLE - FINE

## (undated) DEVICE — KENDALL SCD EXPRESS SLEEVES, KNEE LENGTH, MEDIUM: Brand: KENDALL SCD

## (undated) DEVICE — SUTURE VCRL SZ 4-0 L18IN ABSRB UD L19MM PC-5 3/8 CIR J823G

## (undated) DEVICE — PAD PREP ALCOHOL LG STERILE -- CONVERT TO ITEM 305014

## (undated) DEVICE — MAYO STAND COVER: Brand: CONVERTORS

## (undated) DEVICE — ELECTRODE BLDE L4IN NONINSULATED EDGE

## (undated) DEVICE — 3M™ IOBAN™ 2 ANTIMICROBIAL INCISE DRAPE 6650EZ: Brand: IOBAN™ 2

## (undated) DEVICE — INTENDED FOR TISSUE SEPARATION, AND OTHER PROCEDURES THAT REQUIRE A SHARP SURGICAL BLADE TO PUNCTURE OR CUT.: Brand: BARD-PARKER ® CARBON RIB-BACK BLADES

## (undated) DEVICE — DRAPE MICSCP FOR VM900

## (undated) DEVICE — GRAFT DELIVERY KIT

## (undated) DEVICE — GOWN,SIRUS,FABRNF,XL,20/CS: Brand: MEDLINE

## (undated) DEVICE — PACKING 8004051 NEURAY 200PK 13X152MM: Brand: NEURAY ®

## (undated) DEVICE — GAUZE,SPONGE,8"X4",12PLY,XRAY,STRL,LF: Brand: MEDLINE

## (undated) DEVICE — NDL PRT INJ NSAF BLNT 18GX1.5 --

## (undated) DEVICE — X-RAY SPONGES,12 PLY: Brand: DERMACEA

## (undated) DEVICE — SOL IRRIGATION INJ NACL 0.9% 500ML BTL

## (undated) DEVICE — BIPOLAR FORCEPS CORD,BANANA LEADS: Brand: VALLEYLAB

## (undated) DEVICE — BIPOLAR FORCEPS CORD: Brand: VALLEYLAB

## (undated) DEVICE — REM POLYHESIVE ADULT PATIENT RETURN ELECTRODE: Brand: VALLEYLAB

## (undated) DEVICE — TOOL 14MH30 LEGEND 14CM 3MM: Brand: MIDAS REX ™

## (undated) DEVICE — SPONGE LAP 18X18IN STRL -- 5/PK

## (undated) DEVICE — SYR LR LCK 1ML GRAD NSAF 30ML --

## (undated) DEVICE — GAUZE SPONGES,12 PLY: Brand: CURITY

## (undated) DEVICE — STERILE POLYISOPRENE POWDER-FREE SURGICAL GLOVES: Brand: PROTEXIS

## (undated) DEVICE — SPONGE GZ W4XL4IN COT 12 PLY TYP VII WVN C FLD DSGN

## (undated) DEVICE — SUTURE ABSORBABLE BRAIDED 2-0 CT-1 27 IN UD VICRYL J259H

## (undated) DEVICE — SUTURE COAT VCRL SZ 0 L18IN ABSRB UD CTX L48MM 1/2 CIR J724D

## (undated) DEVICE — ABDOMINAL PAD: Brand: DERMACEA

## (undated) DEVICE — GRAFT BNE MAR ART BMC MED

## (undated) DEVICE — PACKING 8004050 NEURAY 200PK 7X152MM: Brand: NEURAY ®

## (undated) DEVICE — TRAY CATH 16F URIN MTR LTX -- CONVERT TO ITEM 363111

## (undated) DEVICE — ASPIRATION KIT

## (undated) DEVICE — (D)STRIP SKN CLSR 0.5X4IN WHT --

## (undated) DEVICE — BRUSH SCRB PCMX NL CLN 12ML --

## (undated) DEVICE — 12FR FRAZIER SUCTION HANDLE: Brand: CARDINAL HEALTH

## (undated) DEVICE — PAD,ABDOMINAL,5"X9",STERILE,LF,1/PK: Brand: MEDLINE INDUSTRIES, INC.

## (undated) DEVICE — PREP SKN DURAPREP 26ML APPL --